# Patient Record
Sex: FEMALE | Race: WHITE | NOT HISPANIC OR LATINO | Employment: FULL TIME | URBAN - METROPOLITAN AREA
[De-identification: names, ages, dates, MRNs, and addresses within clinical notes are randomized per-mention and may not be internally consistent; named-entity substitution may affect disease eponyms.]

---

## 2017-03-14 ENCOUNTER — APPOINTMENT (OUTPATIENT)
Dept: RADIOLOGY | Facility: MEDICAL CENTER | Age: 37
End: 2017-03-14
Attending: EMERGENCY MEDICINE
Payer: COMMERCIAL

## 2017-03-14 ENCOUNTER — APPOINTMENT (OUTPATIENT)
Dept: RADIOLOGY | Facility: MEDICAL CENTER | Age: 37
End: 2017-03-14
Attending: EMERGENCY MEDICINE

## 2017-03-14 ENCOUNTER — HOSPITAL ENCOUNTER (EMERGENCY)
Facility: MEDICAL CENTER | Age: 37
End: 2017-03-14
Attending: EMERGENCY MEDICINE
Payer: COMMERCIAL

## 2017-03-14 VITALS
SYSTOLIC BLOOD PRESSURE: 135 MMHG | BODY MASS INDEX: 22.45 KG/M2 | HEART RATE: 72 BPM | WEIGHT: 122 LBS | DIASTOLIC BLOOD PRESSURE: 86 MMHG | RESPIRATION RATE: 24 BRPM | TEMPERATURE: 98.2 F | HEIGHT: 62 IN | OXYGEN SATURATION: 100 %

## 2017-03-14 DIAGNOSIS — S60.222A CONTUSION OF LEFT HAND, INITIAL ENCOUNTER: ICD-10-CM
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
## 2017-03-14 DIAGNOSIS — S43.005A SHOULDER DISLOCATION, LEFT, INITIAL ENCOUNTER: ICD-10-CM

## 2017-03-14 DIAGNOSIS — R07.81 RIB PAIN ON LEFT SIDE: ICD-10-CM

## 2017-03-14 DIAGNOSIS — S59.912A INJURY OF LEFT FOREARM, INITIAL ENCOUNTER: ICD-10-CM

## 2017-03-14 PROCEDURE — 99285 EMERGENCY DEPT VISIT HI MDM: CPT

## 2017-03-14 PROCEDURE — 96374 THER/PROPH/DIAG INJ IV PUSH: CPT

## 2017-03-14 PROCEDURE — 700105 HCHG RX REV CODE 258: Performed by: EMERGENCY MEDICINE

## 2017-03-14 PROCEDURE — 99152 MOD SED SAME PHYS/QHP 5/>YRS: CPT

## 2017-03-14 PROCEDURE — 96375 TX/PRO/DX INJ NEW DRUG ADDON: CPT

## 2017-03-14 PROCEDURE — 73030 X-RAY EXAM OF SHOULDER: CPT | Mod: LT

## 2017-03-14 PROCEDURE — 700111 HCHG RX REV CODE 636 W/ 250 OVERRIDE (IP): Performed by: EMERGENCY MEDICINE

## 2017-03-14 PROCEDURE — 71101 X-RAY EXAM UNILAT RIBS/CHEST: CPT | Mod: LT

## 2017-03-14 PROCEDURE — 23650 CLTX SHO DSLC W/MNPJ WO ANES: CPT

## 2017-03-14 PROCEDURE — 700111 HCHG RX REV CODE 636 W/ 250 OVERRIDE (IP)

## 2017-03-14 PROCEDURE — 73090 X-RAY EXAM OF FOREARM: CPT | Mod: LT

## 2017-03-14 PROCEDURE — 304562 HCHG STAT O2 MASK/CANNULA

## 2017-03-14 PROCEDURE — 73130 X-RAY EXAM OF HAND: CPT | Mod: LT

## 2017-03-14 PROCEDURE — 72040 X-RAY EXAM NECK SPINE 2-3 VW: CPT

## 2017-03-14 RX ORDER — MORPHINE SULFATE 4 MG/ML
4 INJECTION, SOLUTION INTRAMUSCULAR; INTRAVENOUS ONCE
Status: COMPLETED | OUTPATIENT
Start: 2017-03-14 | End: 2017-03-14

## 2017-03-14 RX ORDER — ONDANSETRON 2 MG/ML
4 INJECTION INTRAMUSCULAR; INTRAVENOUS ONCE
Status: COMPLETED | OUTPATIENT
Start: 2017-03-14 | End: 2017-03-14

## 2017-03-14 RX ORDER — KETOROLAC TROMETHAMINE 30 MG/ML
30 INJECTION, SOLUTION INTRAMUSCULAR; INTRAVENOUS ONCE
Status: COMPLETED | OUTPATIENT
Start: 2017-03-14 | End: 2017-03-14

## 2017-03-14 RX ORDER — SODIUM CHLORIDE 9 MG/ML
1000 INJECTION, SOLUTION INTRAVENOUS ONCE
Status: COMPLETED | OUTPATIENT
Start: 2017-03-14 | End: 2017-03-14

## 2017-03-14 RX ORDER — HYDROCODONE BITARTRATE AND ACETAMINOPHEN 10; 325 MG/1; MG/1
1 TABLET ORAL EVERY 6 HOURS PRN
Qty: 20 TAB | Refills: 0 | Status: SHIPPED | OUTPATIENT
Start: 2017-03-14 | End: 2017-09-26

## 2017-03-14 RX ADMIN — PROPOFOL 140 MG: 10 INJECTION, EMULSION INTRAVENOUS at 13:43

## 2017-03-14 RX ADMIN — MORPHINE SULFATE 4 MG: 4 INJECTION INTRAVENOUS at 12:56

## 2017-03-14 RX ADMIN — KETOROLAC TROMETHAMINE 30 MG: 30 INJECTION, SOLUTION INTRAMUSCULAR; INTRAVENOUS at 13:58

## 2017-03-14 RX ADMIN — SODIUM CHLORIDE 1000 ML: 9 INJECTION, SOLUTION INTRAVENOUS at 13:00

## 2017-03-14 RX ADMIN — ONDANSETRON 4 MG: 2 INJECTION, SOLUTION INTRAMUSCULAR; INTRAVENOUS at 12:55

## 2017-03-14 ASSESSMENT — PAIN SCALES - GENERAL
PAINLEVEL_OUTOF10: 8
PAINLEVEL_OUTOF10: 10
PAINLEVEL_OUTOF10: 10
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
## 2017-03-14 NOTE — ED NOTES
Conscious sedation with Dr. Wood, CHAY Aguilar RRT, and Zahida Brown at bedside. Sister at bedside. Patient tolerated well. Patient alert and following commands. Shoulder immobilizer applied. Sedation start 1315 sedation end 1326. ERP at bedside beforehand counseling patient.

## 2017-03-14 NOTE — ED PROVIDER NOTES
ED Provider Note    [id number] (WITH 1-VIEW CXR) LEFT   Final Result      1.  Negative single view of the chest and left rib series      2.  Mild scoliosis      DX-FOREARM LEFT   Final Result      Negative left forearm series      DX-CERVICAL SPINE-2 OR 3 VIEWS   Final Result      Negative cervical spine series      DX-SHOULDER 2+ LEFT   Final Result      Anatomic reduction of prior left shoulder dislocation      DX-HAND 3+ LEFT   Final Result      Negative left hand series      DX-SHOULDER 2+ LEFT   Final Result      Left anterior/inferior shoulder dislocation without fracture        X-rays are negative.  I have discussed with this patient her chronic pain medication usage, plan for additional 15 tablets if needed, she will contact her normal prescribing physician prior to filling the medication to make sure that her usual doctor is okay with her receiving extra.

## 2017-03-14 NOTE — ED NOTES
Pt undressed in triage room and to [facility] bed per pt approval and er charge. Pt with poss left shoulder dislocation. Refusing ice at this time. unknown last tetanus
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
## 2017-03-14 NOTE — DISCHARGE INSTRUCTIONS
Shoulder Dislocation  Your shoulder is made up of three bones: the collar bone (clavicle); the shoulder blade (scapula), which includes the socket (glenoid cavity); and the upper arm bone (humerus). Your shoulder joint is the place where these bones meet. Strong, fibrous tissues hold these bones together (ligaments). Muscles and strong, fibrous tissues that connect the muscles to these bones (tendons) allow your arm to move through this joint. The range of motion of your shoulder joint is more extensive than most of your other joints, and the glenoid cavity is very shallow. That is the reason that your shoulder joint is one of the most unstable joints in your body. It is far more prone to dislocation than your other joints. Shoulder dislocation is when your humerus is forced out of your shoulder joint.  CAUSES  Shoulder dislocation is caused by a forceful impact on your shoulder. This impact usually is from an injury, such as a sports injury or a fall.  SYMPTOMS  Symptoms of shoulder dislocation include:  · Deformity of your shoulder.  · Intense pain.  · Inability to move your shoulder joint.  · Numbness, weakness, or tingling around your shoulder joint (your neck or down your arm).  · Bruising or swelling around your shoulder.  DIAGNOSIS  In order to diagnose a dislocated shoulder, your caregiver will perform a physical exam. Your caregiver also may have an X-ray exam done to see if you have any broken bones. Magnetic resonance imaging (MRI) is a procedure that sometimes is done to help your caregiver see any damage to the soft tissues around your shoulder, particularly your rotator cuff tendons. Additionally, your caregiver also may have electromyography done to measure the electrical discharges produced in your muscles if you have signs or symptoms of nerve damage.  TREATMENT  A shoulder dislocation is treated by placing the humerus back in the joint (reduction). Your caregiver does this either manually (closed  reduction), by moving your humerus back into the joint through manipulation, or through surgery (open reduction). When your humerus is back in place, severe pain should improve almost immediately.  You also may need to have surgery if you have a weak shoulder joint or ligaments, and you have recurring shoulder dislocations, despite rehabilitation. In rare cases, surgery is necessary if your nerves or blood vessels are damaged during the dislocation.  After your reduction, your arm will be placed in a shoulder immobilizer or sling to keep it from moving. Your caregiver will have you wear your shoulder immobilizer or sling for 3 days to 3 weeks, depending on how serious your dislocation is. When your shoulder immobilizer or sling is removed, your caregiver may prescribe physical therapy to help improve the range of motion in your shoulder joint.  HOME CARE INSTRUCTIONS   The following measures can help to reduce pain and speed up the healing process:  · Rest your injured joint. Do not move it. Avoid activities similar to the one that caused your injury.  · Apply ice to your injured joint for the first day or two after your reduction or as directed by your caregiver. Applying ice helps to reduce inflammation and pain.  ¨ Put ice in a plastic bag.  ¨ Place a towel between your skin and the bag.  ¨ Leave the ice on for 15-20 minutes at a time, every 2 hours while you are awake.  · Exercise your hand by squeezing a soft ball. This helps to eliminate stiffness and swelling in your hand and wrist.  · Take over-the-counter or prescription medicine for pain or discomfort as told by your caregiver.  SEEK IMMEDIATE MEDICAL CARE IF:   · Your shoulder immobilizer or sling becomes damaged.  · Your pain becomes worse rather than better.  · You lose feeling in your arm or hand, or they become white and cold.  MAKE SURE YOU:   · Understand these instructions.  · Will watch your condition.  · Will get help right away if you are not  doing well or get worse.     This information is not intended to replace advice given to you by your health care provider. Make sure you discuss any questions you have with your health care provider.     Document Released: 09/12/2002 Document Revised: 01/08/2016 Document Reviewed: 04/11/2016  [employer] Interactive Patient Education ©2016 [employer]

## 2017-03-14 NOTE — ED NOTES
Discharge instructions provided.  Pt verbalized the understanding of discharge instructions to follow up with PCP and to return to ER if condition worsens.  Pt ambulated out of ER without difficulty.   Sister to drive home. Patient educated on 1 new prescription. CMS reassessed post sling application

## 2017-03-14 NOTE — RESPIRATORY CARE
Conscious Sedation Respiratory Update       O2 (LPM): 2 (03/14/17 1333) sat 100%. On 4 LPM during procedure with O2 sat 100%. Tolerated procedure well.

## 2017-03-14 NOTE — ED AVS SNAPSHOT
3/14/2017          Kathleen Alvarado  [address] [place] [address]    Dear Kathleen:    Pending sale to [facility] wants to ensure your discharge home is safe and you or your loved ones have had all your questions answered regarding your care after you leave the hospital.    You may receive a telephone call within two days of your discharge.  This call is to make certain you understand your discharge instructions as well as ensure we provided you with the best care possible during your stay with us.     The call will only last approximately 3-5 minutes and will be done by a nurse.    Once again, we want to ensure your discharge home is safe and that you have a clear understanding of any next steps in your care.  If you have any questions or concerns, please do not hesitate to contact us, we are here for you.  Thank you for choosing [facility] for your healthcare needs.    Sincerely,    Danny Henderson    [facility]

## 2017-03-14 NOTE — ED AVS SNAPSHOT
ContentForest Access Code: Activation code not generated  Current ContentForest Status: Active    [employer]  A secure, online tool to manage your health information     [employer]’s ContentForest® is a secure, online tool that connects you to your personalized health information from the privacy of your home -- day or night - making it very easy for you to manage your healthcare. Once the activation process is completed, you can even access your medical information using the ContentForest ty, which is available for free in the Apple Ty store or Google Play store.     ContentForest provides the following levels of access (as shown below):   My Chart Features   [facility] Primary Care Doctor [facility]  Specialists [facility]  Urgent  Care Non-[facility]  Primary Care  Doctor   Email your healthcare team securely and privately 24/7 X X X X   Manage appointments: schedule your next appointment; view details of past/upcoming appointments X      Request prescription refills. X      View recent personal medical records, including lab and immunizations X X X X   View health record, including health history, allergies, medications X X X X   Read reports about your outpatient visits, procedures, consult and ER notes X X X X   See your discharge summary, which is a recap of your hospital and/or ER visit that includes your diagnosis, lab results, and care plan. X X       How to register for ContentForest:  1. Go to  [email or web address].  2. Click on the Sign Up Now box, which takes you to the New Member Sign Up page. You will need to provide the following information:  a. Enter your ContentForest Access Code exactly as it appears at the top of this page. (You will not need to use this code after you’ve completed the sign-up process. If you do not sign up before the expiration date, you must request a new code.)   b. Enter your date of birth.   c. Enter your home email address.   d. Click Submit, and follow the next screen’s instructions.  3. Create a ContentForest ID. This will  be your [employer] login ID and cannot be changed, so think of one that is secure and easy to remember.  4. Create a [employer] password. You can change your password at any time.  5. Enter your Password Reset Question and Answer. This can be used at a later time if you forget your password.   6. Enter your e-mail address. This allows you to receive e-mail notifications when new information is available in [employer].  7. Click Sign Up. You can now view your health information.    For assistance activating your [employer] account, call [phone]

## 2017-06-30 ENCOUNTER — HOSPITAL ENCOUNTER (EMERGENCY)
Facility: MEDICAL CENTER | Age: 37
End: 2017-06-30
Attending: EMERGENCY MEDICINE
Payer: COMMERCIAL

## 2017-06-30 ENCOUNTER — APPOINTMENT (OUTPATIENT)
Dept: RADIOLOGY | Facility: MEDICAL CENTER | Age: 37
End: 2017-06-30
Attending: EMERGENCY MEDICINE
Payer: COMMERCIAL

## 2017-06-30 VITALS
BODY MASS INDEX: 22.68 KG/M2 | HEIGHT: 63 IN | OXYGEN SATURATION: 100 % | TEMPERATURE: 98.6 F | WEIGHT: 128 LBS | RESPIRATION RATE: 15 BRPM | HEART RATE: 73 BPM | DIASTOLIC BLOOD PRESSURE: 70 MMHG | SYSTOLIC BLOOD PRESSURE: 133 MMHG

## 2017-06-30 DIAGNOSIS — R56.9 SEIZURE (HCC): ICD-10-CM

## 2017-06-30 LAB
ANION GAP SERPL CALC-SCNC: 11 MMOL/L (ref 0–11.9)
BASOPHILS # BLD AUTO: 0.7 % (ref 0–1.8)
BASOPHILS # BLD: 0.05 K/UL (ref 0–0.12)
BUN SERPL-MCNC: 6 MG/DL (ref 8–22)
CALCIUM SERPL-MCNC: 9 MG/DL (ref 8.5–10.5)
CHLORIDE SERPL-SCNC: 107 MMOL/L (ref 96–112)
CK SERPL-CCNC: 107 U/L (ref 0–154)
CO2 SERPL-SCNC: 19 MMOL/L (ref 20–33)
CREAT SERPL-MCNC: 1.03 MG/DL (ref 0.5–1.4)
EOSINOPHIL # BLD AUTO: 0.16 K/UL (ref 0–0.51)
EOSINOPHIL NFR BLD: 2.3 % (ref 0–6.9)
ERYTHROCYTE [DISTWIDTH] IN BLOOD BY AUTOMATED COUNT: 39 FL (ref 35.9–50)
GFR SERPL CREATININE-BSD FRML MDRD: >60 ML/MIN/1.73 M 2
GLUCOSE SERPL-MCNC: 102 MG/DL (ref 65–99)
HCT VFR BLD AUTO: 35.4 % (ref 37–47)
HGB BLD-MCNC: 12.1 G/DL (ref 12–16)
IMM GRANULOCYTES # BLD AUTO: 0.05 K/UL (ref 0–0.11)
IMM GRANULOCYTES NFR BLD AUTO: 0.7 % (ref 0–0.9)
LYMPHOCYTES # BLD AUTO: 2.66 K/UL (ref 1–4.8)
LYMPHOCYTES NFR BLD: 37.5 % (ref 22–41)
MCH RBC QN AUTO: 32 PG (ref 27–33)
MCHC RBC AUTO-ENTMCNC: 34.2 G/DL (ref 33.6–35)
MCV RBC AUTO: 93.7 FL (ref 81.4–97.8)
MONOCYTES # BLD AUTO: 0.35 K/UL (ref 0–0.85)
MONOCYTES NFR BLD AUTO: 4.9 % (ref 0–13.4)
NEUTROPHILS # BLD AUTO: 3.82 K/UL (ref 2–7.15)
NEUTROPHILS NFR BLD: 53.9 % (ref 44–72)
NRBC # BLD AUTO: 0 K/UL
NRBC BLD AUTO-RTO: 0 /100 WBC
PLATELET # BLD AUTO: 248 K/UL (ref 164–446)
PMV BLD AUTO: 9.4 FL (ref 9–12.9)
POTASSIUM SERPL-SCNC: 3.2 MMOL/L (ref 3.6–5.5)
PROLACTIN SERPL-MCNC: 88.35 NG/ML (ref 2.8–26)
RBC # BLD AUTO: 3.78 M/UL (ref 4.2–5.4)
SODIUM SERPL-SCNC: 137 MMOL/L (ref 135–145)
WBC # BLD AUTO: 7.1 K/UL (ref 4.8–10.8)

## 2017-06-30 PROCEDURE — 96361 HYDRATE IV INFUSION ADD-ON: CPT

## 2017-06-30 PROCEDURE — 85025 COMPLETE CBC W/AUTO DIFF WBC: CPT

## 2017-06-30 PROCEDURE — 36415 COLL VENOUS BLD VENIPUNCTURE: CPT

## 2017-06-30 PROCEDURE — 700102 HCHG RX REV CODE 250 W/ 637 OVERRIDE(OP): Performed by: EMERGENCY MEDICINE

## 2017-06-30 PROCEDURE — 84146 ASSAY OF PROLACTIN: CPT

## 2017-06-30 PROCEDURE — 99284 EMERGENCY DEPT VISIT MOD MDM: CPT

## 2017-06-30 PROCEDURE — 70450 CT HEAD/BRAIN W/O DYE: CPT

## 2017-06-30 PROCEDURE — 700105 HCHG RX REV CODE 258: Performed by: EMERGENCY MEDICINE

## 2017-06-30 PROCEDURE — 82550 ASSAY OF CK (CPK): CPT

## 2017-06-30 PROCEDURE — 96360 HYDRATION IV INFUSION INIT: CPT

## 2017-06-30 PROCEDURE — 80048 BASIC METABOLIC PNL TOTAL CA: CPT

## 2017-06-30 PROCEDURE — A9270 NON-COVERED ITEM OR SERVICE: HCPCS | Performed by: EMERGENCY MEDICINE

## 2017-06-30 RX ORDER — SODIUM CHLORIDE 9 MG/ML
1000 INJECTION, SOLUTION INTRAVENOUS ONCE
Status: COMPLETED | OUTPATIENT
Start: 2017-06-30 | End: 2017-06-30

## 2017-06-30 RX ORDER — POTASSIUM CHLORIDE 1.5 G/1.58G
20 POWDER, FOR SOLUTION ORAL ONCE
Status: COMPLETED | OUTPATIENT
Start: 2017-06-30 | End: 2017-06-30

## 2017-06-30 RX ADMIN — SODIUM CHLORIDE 1000 ML: 9 INJECTION, SOLUTION INTRAVENOUS at 16:53

## 2017-06-30 RX ADMIN — POTASSIUM CHLORIDE 20 MEQ: 1.5 POWDER, FOR SOLUTION ORAL at 18:36

## 2017-06-30 ASSESSMENT — PAIN SCALES - GENERAL: PAINLEVEL_OUTOF10: 0

## 2017-06-30 NOTE — ED AVS SNAPSHOT
Home Care Instructions                                                                                                                Kathleen Alvarado   MRN: [id number]    Department:  [facility], Emergency Dept   Date of Visit:  6/30/2017            [facility], Emergency Dept    [address] [address] [facility] [address]    Phone:  [phone]      You were seen by     Jamie Damon M.D.      Your Diagnosis Was     Seizure (CMS-HCC)     R56.9       These are the medications you received during your hospitalization from 06/30/2017 1555 to 06/30/2017 1900     Date/Time Order Dose Route Action    06/30/2017 1653 NS infusion 1,000 mL 1,000 mL Intravenous New Bag    06/30/2017 1836 potassium chloride (KLOR-CON) 20 MEQ packet 20 mEq 20 mEq Oral Given      Follow-up Information     1. Follow up with Elisa Byrd PA-C In 1 week.    Specialty:  Family Medicine    Contact information    [address] [facility] [address] [phone]          2. Follow up with [facility], Emergency Dept In 1 day.    Specialty:  Emergency Medicine    Why:  As needed, If symptoms worsen    Contact information    [address] [place] [address] [phone]        3. Follow up with Kavitha Guidry M.D. In 1 week.    Specialty:  Neurology    Contact information    [address] [address] [phone]        Medication Information     Review all of your home medications and newly ordered medications with your primary doctor and/or pharmacist as soon as possible. Follow medication instructions as directed by your doctor and/or pharmacist.     Please keep your complete medication list with you and share with your physician. Update the information when medications are discontinued, doses are changed, or new medications (including over-the-counter products) are added; and carry medication information at all times in the event of emergency situations.                  Medication List      ASK your doctor about these medications        Instructions    Morning Afternoon Evening Bedtime    alprazolam 2 MG tablet   Commonly known as:  XANAX        Take 2 mg by mouth at bedtime as needed for Sleep.   Dose:  2 mg                        busPIRone 15 MG tablet   Commonly known as:  BUSPAR        Doctor's comments:  No further renewals from this office without a clinic visit with a new provider   Take 1 Tab by mouth 2 times a day.   Dose:  15 mg                        estradiol 0.1 MG/24HR Pttw   Commonly known as:  VIVELLE DOT        Apply 1 Patch to skin as directed 2 times a week. Use one patch two times per week.   Dose:  1 Patch                        furosemide 20 MG Tabs   Commonly known as:  LASIX        Take 1 Tab by mouth 1 time daily as needed. Indications: Edema   Dose:  20 mg                        * hydrocodone/acetaminophen  MG Tabs   Commonly known as:  NORCO        Take 1 Tab by mouth 2 times a day as needed.   Dose:  1 Tab                        * hydrocodone/acetaminophen  MG Tabs   Commonly known as:  NORCO        Take 1 Tab by mouth every 6 hours as needed for Severe Pain.   Dose:  1 Tab                        * hydrocodone/acetaminophen  MG Tabs   Commonly known as:  NORCO        Take 1 Tab by mouth every 6 hours as needed.   Dose:  1 Tab                        levothyroxine 100 MCG Tabs   Commonly known as:  SYNTHROID        Doctor's comments:  Needs to have labs done for further refills.   Take 1 Tab by mouth every day.   Dose:  100 mcg                        * Notice:  This list has 3 medication(s) that are the same as other medications prescribed for you. Read the directions carefully, and ask your doctor or other care provider to review them with you.            Procedures and tests performed during your visit     BASIC METABOLIC PANEL    CBC WITH DIFFERENTIAL    CREATINE KINASE    CT-HEAD W/O    ESTIMATED GFR    PROLACTIN        Discharge  Instructions       Driving and Equipment Restrictions  Some medical problems make it dangerous to drive, ride a bike, or use machines. Some of these problems are:  · A hard blow to the head (concussion).  · Passing out (fainting).  · Twitching and shaking (seizures).  · Low blood sugar.  · Taking medicine to help you relax (sedatives).  · Taking pain medicines.  · Wearing an eye patch.  · Wearing splints. This can make it hard to use parts of your body that you need to drive safely.  HOME CARE   · Do not drive until your doctor says it is okay.  · Do not use machines until your doctor says it is okay.  You may need a form signed by your doctor (medical release) before you can drive again. You may also need this form before you do other tasks where you need to be fully alert.  MAKE SURE YOU:  · Understand these instructions.  · Will watch your condition.  · Will get help right away if you are not doing well or get worse.     This information is not intended to replace advice given to you by your health care provider. Make sure you discuss any questions you have with your health care provider.     Document Released: 01/25/2006 Document Revised: 03/11/2013 Document Reviewed: 04/27/2011  [employer] Interactive Patient Education ©2016 [employer]    Epilepsy  People with epilepsy have times when they shake and jerk uncontrollably (seizures). This happens when there is a sudden change in brain function. Epilepsy may have many possible causes. Anything that disturbs the normal pattern of brain cell activity can lead to seizures.  HOME CARE   · Follow your doctor's instructions about driving and safety during normal activities.  · Get enough sleep.  · Only take medicine as told by your doctor.  · Avoid things that you know can cause you to have seizures (triggers).  · Write down when your seizures happen and what you remember about each seizure. Write down anything you think may have caused the seizure to happen.  · Tell  the people you live and work with that you have seizures. Make sure they know how to help you. They should:  ¨ Cushion your head and body.  ¨ Turn you on your side.  ¨ Not restrain you.  ¨ Not place anything inside your mouth.  ¨ Call for local emergency medical help if there is any question about what has happened.  · Keep all follow-up visits with your doctor. This is very important.  GET HELP IF:  · You get an infection or start to feel sick. You may have more seizures when you are sick.  · You are having seizures more often.  · Your seizure pattern is changing.  GET HELP RIGHT AWAY IF:   · A seizure does not stop after a few seconds or minutes.  · A seizure causes you to have trouble breathing.  · A seizure gives you a very bad headache.  · A seizure makes you unable to speak or use a part of your body.     This information is not intended to replace advice given to you by your health care provider. Make sure you discuss any questions you have with your health care provider.     Document Released: 10/15/2010 Document Revised: 10/08/2014 Document Reviewed: 07/30/2014  [employer] Interactive Patient Education ©2016 [employer]            Patient Information     Patient Information    Following emergency treatment: all patient requiring follow-up care must return either to a private physician or a clinic if your condition worsens before you are able to obtain further medical attention, please return to the emergency room.     Billing Information    At [facility], we work to make the billing process streamlined for our patients.  Our Representatives are here to answer any questions you may have regarding your hospital bill.  If you have insurance coverage and have supplied your insurance information to us, we will submit a claim to your insurer on your behalf.  Should you have any questions regarding your bill, we can be reached online or by phone as follows:  Online: You are able pay your bills online or live  chat with our representatives about any billing questions you may have. We are here to help Monday - Friday from 8:00am to 7:30pm and 9:00am - 12:00pm on Saturdays.  Please visit [email or web address]  for more information.   Phone:  [phone] or [phone]    Please note that your emergency physician, surgeon, pathologist, radiologist, anesthesiologist, and other specialists are not employed by [facility] and will therefore bill separately for their services.  Please contact them directly for any questions concerning their bills at the numbers below:     Emergency Physician Services:  [phone]  [facility]:  [phone]  [facility]:  [phone]  [facility]:  [phone]    1. Your final bill may vary from the amount quoted upon discharge if all procedures are not complete at that time, or if your doctor has additional procedures of which we are not aware. You will receive an additional bill if you return to the Emergency Department at [facility] for suture removal regardless of the facility of which the sutures were placed.     2. Please arrange for settlement of this account at the emergency registration.    3. All self-pay accounts are due in full at the time of treatment.  If you are unable to meet this obligation then payment is expected within 4-5 days.     4. If you have had radiology studies (CT, X-ray, Ultrasound, MRI), you have received a preliminary result during your emergency department visit. Please contact the radiology department [phone] to receive a copy of your final result. Please discuss the Final result with your primary physician or with the follow up physician provided.     Crisis Hotline:  [place] Crisis Hotline:  9-519-NTMFOAE or 1-824.903.1494  [place] Crisis Hotline:    1-536.942.2154 or 849-809-3951         ED Discharge Follow Up Questions    1. In order to provide you with very good  care, we would like to follow up with a phone call in the next few days.  May we have your permission to contact you?     YES /  NO    2. What is the best phone number to call you? (       )_____-__________    3. What is the best time to call you?      Morning  /  Afternoon  /  Evening                   Patient Signature:  ____________________________________________________________    Date:  ____________________________________________________________

## 2017-06-30 NOTE — ED AVS SNAPSHOT
Acunu Access Code: Activation code not generated  Current Acunu Status: Active    [employer]  A secure, online tool to manage your health information     [employer] Acunu® is a secure, online tool that connects you to your personalized health information from the privacy of your home -- day or night - making it very easy for you to manage your healthcare. Once the activation process is completed, you can even access your medical information using the Acunu ty, which is available for free in the Apple Ty store or Google Play store.     Acunu provides the following levels of access (as shown below):   My Chart Features   [facility] Primary Care Doctor [facility]  Specialists [facility]  Urgent  Care Non-[facility]  Primary Care  Doctor   Email your healthcare team securely and privately 24/7 X X X X   Manage appointments: schedule your next appointment; view details of past/upcoming appointments X      Request prescription refills. X      View recent personal medical records, including lab and immunizations X X X X   View health record, including health history, allergies, medications X X X X   Read reports about your outpatient visits, procedures, consult and ER notes X X X X   See your discharge summary, which is a recap of your hospital and/or ER visit that includes your diagnosis, lab results, and care plan. X X       How to register for Acunu:  1. Go to  [email or web address].  2. Click on the Sign Up Now box, which takes you to the New Member Sign Up page. You will need to provide the following information:  a. Enter your Acunu Access Code exactly as it appears at the top of this page. (You will not need to use this code after you’ve completed the sign-up process. If you do not sign up before the expiration date, you must request a new code.)   b. Enter your date of birth.   c. Enter your home email address.   d. Click Submit, and follow the next screen’s instructions.  3. Create a Acunu ID. This will  be your [employer] login ID and cannot be changed, so think of one that is secure and easy to remember.  4. Create a [employer] password. You can change your password at any time.  5. Enter your Password Reset Question and Answer. This can be used at a later time if you forget your password.   6. Enter your e-mail address. This allows you to receive e-mail notifications when new information is available in [employer].  7. Click Sign Up. You can now view your health information.    For assistance activating your [employer] account, call [phone]

## 2017-06-30 NOTE — ED AVS SNAPSHOT
6/30/2017    Kathleen Leger [place] [address]    Dear Kathleen:    [facility] wants to ensure your discharge home is safe and you or your loved ones have had all of your questions answered regarding your care after you leave the hospital.    Below is a list of resources and contact information should you have any questions regarding your hospital stay, follow-up instructions, or active medical symptoms.    Questions or Concerns Regarding… Contact   Medical Questions Related to Your Discharge  (7 days a week, 8am-5pm) Contact a Nurse Care Coordinator   [phone]   Medical Questions Not Related to Your Discharge  (24 hours a day / 7 days a week)  Contact the Nurse Health Line   [phone]    Medications or Discharge Instructions Refer to your discharge packet   or contact your [facility] Primary Care Provider   [phone]   Follow-up Appointment(s) Schedule your appointment via Loomia   or contact Scheduling [phone]   Billing Review your statement via Loomia  or contact Billing [phone]   Medical Records Review your records via Loomia   or contact Medical Records [phone]     You may receive a telephone call within two days of discharge. This call is to make certain you understand your discharge instructions and have the opportunity to have any questions answered. You can also easily access your medical information, test results and upcoming appointments via the Loomia free online health management tool. You can learn more and sign up at [employer]/Loomia. For assistance setting up your Loomia account, please call [phone].    Once again, we want to ensure your discharge home is safe and that you have a clear understanding of any next steps in your care. If you have any questions or concerns, please do not hesitate to contact us, we are here for you. Thank you for choosing [facility] for your healthcare needs.    Sincerely,    Your [facility] Healthcare Team
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
## 2017-07-01 NOTE — DISCHARGE INSTRUCTIONS
Driving and Equipment Restrictions  Some medical problems make it dangerous to drive, ride a bike, or use machines. Some of these problems are:  · A hard blow to the head (concussion).  · Passing out (fainting).  · Twitching and shaking (seizures).  · Low blood sugar.  · Taking medicine to help you relax (sedatives).  · Taking pain medicines.  · Wearing an eye patch.  · Wearing splints. This can make it hard to use parts of your body that you need to drive safely.  HOME CARE   · Do not drive until your doctor says it is okay.  · Do not use machines until your doctor says it is okay.  You may need a form signed by your doctor (medical release) before you can drive again. You may also need this form before you do other tasks where you need to be fully alert.  MAKE SURE YOU:  · Understand these instructions.  · Will watch your condition.  · Will get help right away if you are not doing well or get worse.     This information is not intended to replace advice given to you by your health care provider. Make sure you discuss any questions you have with your health care provider.     Document Released: 01/25/2006 Document Revised: 03/11/2013 Document Reviewed: 04/27/2011  [employer] Patient Education ©2016 [employer]    Epilepsy  People with epilepsy have times when they shake and jerk uncontrollably (seizures). This happens when there is a sudden change in brain function. Epilepsy may have many possible causes. Anything that disturbs the normal pattern of brain cell activity can lead to seizures.  HOME CARE   · Follow your doctor's instructions about driving and safety during normal activities.  · Get enough sleep.  · Only take medicine as told by your doctor.  · Avoid things that you know can cause you to have seizures (triggers).  · Write down when your seizures happen and what you remember about each seizure. Write down anything you think may have caused the seizure to happen.  · Tell the people you live  and work with that you have seizures. Make sure they know how to help you. They should:  ¨ Cushion your head and body.  ¨ Turn you on your side.  ¨ Not restrain you.  ¨ Not place anything inside your mouth.  ¨ Call for local emergency medical help if there is any question about what has happened.  · Keep all follow-up visits with your doctor. This is very important.  GET HELP IF:  · You get an infection or start to feel sick. You may have more seizures when you are sick.  · You are having seizures more often.  · Your seizure pattern is changing.  GET HELP RIGHT AWAY IF:   · A seizure does not stop after a few seconds or minutes.  · A seizure causes you to have trouble breathing.  · A seizure gives you a very bad headache.  · A seizure makes you unable to speak or use a part of your body.     This information is not intended to replace advice given to you by your health care provider. Make sure you discuss any questions you have with your health care provider.     Document Released: 10/15/2010 Document Revised: 10/08/2014 Document Reviewed: 07/30/2014  [employer] Interactive Patient Education ©2016 [employer]

## 2017-07-01 NOTE — ED NOTES
All lines and monitors d/c'd. Discharge paperwork and medications reviewed with pt her her partner. Pt states she understands and had no questions. Pt encouraged to follow-up with PCP and neurology and come back to ER with worsening symptoms. Instructed not to drive until cleared by a neurologist or her PCP.  Pt verbalizes understanding. Pt ambulated out of ED with steady gait.

## 2017-09-26 DIAGNOSIS — Z01.812 PRE-OPERATIVE LABORATORY EXAMINATION: ICD-10-CM

## 2017-09-26 LAB
ANION GAP SERPL CALC-SCNC: 10 MMOL/L (ref 0–11.9)
BUN SERPL-MCNC: 13 MG/DL (ref 8–22)
CALCIUM SERPL-MCNC: 9.9 MG/DL (ref 8.5–10.5)
CHLORIDE SERPL-SCNC: 103 MMOL/L (ref 96–112)
CO2 SERPL-SCNC: 25 MMOL/L (ref 20–33)
CREAT SERPL-MCNC: 0.85 MG/DL (ref 0.5–1.4)
GFR SERPL CREATININE-BSD FRML MDRD: >60 ML/MIN/1.73 M 2
GLUCOSE SERPL-MCNC: 89 MG/DL (ref 65–99)
POTASSIUM SERPL-SCNC: 3.7 MMOL/L (ref 3.6–5.5)
SODIUM SERPL-SCNC: 138 MMOL/L (ref 135–145)

## 2017-09-26 PROCEDURE — 80048 BASIC METABOLIC PNL TOTAL CA: CPT

## 2017-09-26 PROCEDURE — 36415 COLL VENOUS BLD VENIPUNCTURE: CPT

## 2017-09-26 RX ORDER — LEVOTHYROXINE SODIUM 0.1 MG/1
100 TABLET ORAL
COMMUNITY
End: 2022-03-09

## 2017-09-27 ENCOUNTER — HOSPITAL ENCOUNTER (OUTPATIENT)
Facility: MEDICAL CENTER | Age: 37
End: 2017-09-27
Attending: SURGERY | Admitting: SURGERY
Payer: COMMERCIAL

## 2017-09-27 VITALS
RESPIRATION RATE: 16 BRPM | HEIGHT: 62 IN | TEMPERATURE: 98.3 F | OXYGEN SATURATION: 100 % | WEIGHT: 115.52 LBS | HEART RATE: 58 BPM | BODY MASS INDEX: 21.26 KG/M2

## 2017-09-27 PROBLEM — K40.91 UNILATERAL RECURRENT INGUINAL HERNIA: Status: ACTIVE | Noted: 2017-09-27

## 2017-09-27 PROCEDURE — 160048 HCHG OR STATISTICAL LEVEL 1-5: Performed by: SURGERY

## 2017-09-27 PROCEDURE — 160029 HCHG SURGERY MINUTES - 1ST 30 MINS LEVEL 4: Performed by: SURGERY

## 2017-09-27 PROCEDURE — 160046 HCHG PACU - 1ST 60 MINS PHASE II: Performed by: SURGERY

## 2017-09-27 PROCEDURE — 502714 HCHG ROBOTIC SURGERY SERVICES: Performed by: SURGERY

## 2017-09-27 PROCEDURE — 160009 HCHG ANES TIME/MIN: Performed by: SURGERY

## 2017-09-27 PROCEDURE — 700102 HCHG RX REV CODE 250 W/ 637 OVERRIDE(OP)

## 2017-09-27 PROCEDURE — 700111 HCHG RX REV CODE 636 W/ 250 OVERRIDE (IP)

## 2017-09-27 PROCEDURE — 160025 RECOVERY II MINUTES (STATS): Performed by: SURGERY

## 2017-09-27 PROCEDURE — 160041 HCHG SURGERY MINUTES - EA ADDL 1 MIN LEVEL 4: Performed by: SURGERY

## 2017-09-27 PROCEDURE — 160002 HCHG RECOVERY MINUTES (STAT): Performed by: SURGERY

## 2017-09-27 PROCEDURE — 503108 HCHG CANNULA SEAL 8.5-13MM: Performed by: SURGERY

## 2017-09-27 PROCEDURE — 500868 HCHG NEEDLE, SURGI(VARES): Performed by: SURGERY

## 2017-09-27 PROCEDURE — 700101 HCHG RX REV CODE 250

## 2017-09-27 PROCEDURE — A9270 NON-COVERED ITEM OR SERVICE: HCPCS

## 2017-09-27 PROCEDURE — 502240 HCHG MISC OR SUPPLY RC 0272: Performed by: SURGERY

## 2017-09-27 PROCEDURE — 501838 HCHG SUTURE GENERAL: Performed by: SURGERY

## 2017-09-27 PROCEDURE — 160036 HCHG PACU - EA ADDL 30 MINS PHASE I: Performed by: SURGERY

## 2017-09-27 PROCEDURE — 160035 HCHG PACU - 1ST 60 MINS PHASE I: Performed by: SURGERY

## 2017-09-27 PROCEDURE — 503366 HCHG TROCAR, 12X150 KII FIOS Z THR: Performed by: SURGERY

## 2017-09-27 RX ORDER — KETOROLAC TROMETHAMINE 30 MG/ML
INJECTION, SOLUTION INTRAMUSCULAR; INTRAVENOUS
Status: COMPLETED
Start: 2017-09-27 | End: 2017-09-27

## 2017-09-27 RX ORDER — BUPIVACAINE HYDROCHLORIDE AND EPINEPHRINE 5; 5 MG/ML; UG/ML
INJECTION, SOLUTION EPIDURAL; INTRACAUDAL; PERINEURAL
Status: DISCONTINUED | OUTPATIENT
Start: 2017-09-27 | End: 2017-09-27 | Stop reason: HOSPADM

## 2017-09-27 RX ORDER — OXYCODONE HCL 5 MG/5 ML
SOLUTION, ORAL ORAL
Status: COMPLETED
Start: 2017-09-27 | End: 2017-09-27

## 2017-09-27 RX ORDER — HALOPERIDOL 5 MG/ML
INJECTION INTRAMUSCULAR
Status: COMPLETED
Start: 2017-09-27 | End: 2017-09-27

## 2017-09-27 RX ORDER — MAGNESIUM HYDROXIDE 1200 MG/15ML
LIQUID ORAL
Status: DISCONTINUED | OUTPATIENT
Start: 2017-09-27 | End: 2017-09-27 | Stop reason: HOSPADM

## 2017-09-27 RX ORDER — SODIUM CHLORIDE, SODIUM LACTATE, POTASSIUM CHLORIDE, CALCIUM CHLORIDE 600; 310; 30; 20 MG/100ML; MG/100ML; MG/100ML; MG/100ML
INJECTION, SOLUTION INTRAVENOUS CONTINUOUS
Status: DISCONTINUED | OUTPATIENT
Start: 2017-09-27 | End: 2017-09-27 | Stop reason: HOSPADM

## 2017-09-27 RX ADMIN — KETOROLAC TROMETHAMINE 15 MG: 30 INJECTION, SOLUTION INTRAMUSCULAR at 18:50

## 2017-09-27 RX ADMIN — HALOPERIDOL LACTATE 1 MG: 5 INJECTION, SOLUTION INTRAMUSCULAR at 19:30

## 2017-09-27 RX ADMIN — SODIUM CHLORIDE, SODIUM LACTATE, POTASSIUM CHLORIDE, CALCIUM CHLORIDE: 600; 310; 30; 20 INJECTION, SOLUTION INTRAVENOUS at 16:00

## 2017-09-27 RX ADMIN — OXYCODONE HYDROCHLORIDE 10 MG: 5 SOLUTION ORAL at 18:50

## 2017-09-27 RX ADMIN — FENTANYL CITRATE 50 MCG: 50 INJECTION, SOLUTION INTRAMUSCULAR; INTRAVENOUS at 19:00

## 2017-09-27 RX ADMIN — FENTANYL CITRATE 50 MCG: 50 INJECTION, SOLUTION INTRAMUSCULAR; INTRAVENOUS at 18:49

## 2017-09-27 RX ADMIN — FENTANYL CITRATE 50 MCG: 50 INJECTION, SOLUTION INTRAMUSCULAR; INTRAVENOUS at 19:41

## 2017-09-27 RX ADMIN — FENTANYL CITRATE 50 MCG: 50 INJECTION, SOLUTION INTRAMUSCULAR; INTRAVENOUS at 19:28

## 2017-09-27 ASSESSMENT — PAIN SCALES - GENERAL
PAINLEVEL_OUTOF10: 8
PAINLEVEL_OUTOF10: 7
PAINLEVEL_OUTOF10: 8

## 2017-09-28 NOTE — DISCHARGE INSTRUCTIONS
ACTIVITY: Rest and take it easy for the first 24 hours.  A responsible adult is recommended to remain with you during that time.  It is normal to feel sleepy.  We encourage you to not do anything that requires balance, judgment or coordination.    MILD FLU-LIKE SYMPTOMS ARE NORMAL. YOU MAY EXPERIENCE GENERALIZED MUSCLE ACHES, THROAT IRRITATION, HEADACHE AND/OR SOME NAUSEA.    FOR 24 HOURS DO NOT:  Drive, operate machinery or run household appliances.  Drink beer or alcoholic beverages.   Make important decisions or sign legal documents.    SPECIAL INSTRUCTIONS:   Hernia Repair  Care After  These instructions give you information on caring for yourself after your procedure. Your doctor may also give you more specific instructions. Call your doctor if you have any problems or questions after your procedure.  HOME CARE   · You may have changes in your poops (bowel movements).  · You may have loose or watery poop (diarrhea).  · You may be not able to poop.  · Your bowels will slowly get back to normal.  · Do not eat any food that makes you sick to your stomach (nauseous). Eat small meals 4 to 6 times a day instead of 3 large ones.  · Do not drink pop. It will give you gas.  · Do not drink alcohol.  · Do not lift anything heavier than 10 pounds. This is about the weight of a gallon of milk.  · Do not do anything that makes you very tired for at least 6 weeks.  · Do not get your wound wet for 2 days.  · You may take a sponge bath during this time.  · After 2 days you may take a shower. Gently pat your surgical cut (incision) dry with a towel. Do not rub it.  · For men: You may have been given an athletic supporter (scrotal support) before you left the hospital. It holds your scrotum and testicles closer to your body so there is no strain on your wound. Wear the supporter until your doctor tells you that you do not need it anymore.  GET HELP RIGHT AWAY IF:  · You have watery poop, or cannot poop for more than 3  days.  · You feel sick to your stomach or throw up (vomit) more than 2 or 3 times.  · You have temperature by mouth above 102° F (38.9° C).  · You see redness or puffiness (swelling) around your wound.  · You see yellowish white fluid (pus) coming from your wound.  · You see a bulge or bump in your lower belly (abdomen) or near your groin.  · You develop a rash, trouble breathing, or any other symptoms from medicines taken.  MAKE SURE YOU:  · Understand these instructions.  · Will watch your condition.  · Will get help right away if your are not doing well or get worse.  Document Released: 11/30/2009 Document Revised: 03/11/2013 Document Reviewed: 11/30/2009  [employer]® Patient Information ©2014 [employer]      DIET: To avoid nausea, slowly advance diet as tolerated, avoiding spicy or greasy foods for the first day.  Add more substantial food to your diet according to your physician's instructions.    INCREASE FLUIDS AND FIBER TO AVOID CONSTIPATION.    SURGICAL DRESSING/BATHING: as directed    FOLLOW-UP APPOINTMENT:  A follow-up appointment should be arranged with your doctor in 1 week; call to schedule.    You should CALL YOUR PHYSICIAN if you develop:  Fever greater than 101 degrees F.  Pain not relieved by medication, or persistent nausea or vomiting.  Excessive bleeding (blood soaking through dressing) or unexpected drainage from the wound.  Extreme redness or swelling around the incision site, drainage of pus or foul smelling drainage.  Inability to urinate or empty your bladder within 8 hours.  Problems with breathing or chest pain.    You should call 911 if you develop problems with breathing or chest pain.  If you are unable to contact your doctor or surgical center, you should go to the nearest emergency room or urgent care center.  Physician's telephone #: [phone]    If any questions arise, call your doctor.  If your doctor is not available, please feel free to call the Surgical Center at  [phone].  The Center is open Monday through Friday from 7AM to 7PM.  You can also call the HEALTH HOTLINE open 24 hours/day, 7 days/week and speak to a nurse at [phone], or toll free at [phone].    A registered nurse may call you a few days after your surgery to see how you are doing after your procedure.    MEDICATIONS: Resume taking daily medication.  Take prescribed pain medication with food.  If no medication is prescribed, you may take non-aspirin pain medication if needed.  PAIN MEDICATION CAN BE VERY CONSTIPATING.  Take a stool softener or laxative such as senokot, pericolace, or milk of magnesia if needed.     Last pain medication given at 6:50pm.    If your physician has prescribed pain medication that includes Acetaminophen (Tylenol), do not take additional Acetaminophen (Tylenol) while taking the prescribed medication.    Depression / Suicide Risk    As you are discharged from this [facility] facility, it is important to learn how to keep safe from harming yourself.    Recognize the warning signs:  · Abrupt changes in personality, positive or negative- including increase in energy   · Giving away possessions  · Change in eating patterns- significant weight changes-  positive or negative  · Change in sleeping patterns- unable to sleep or sleeping all the time   · Unwillingness or inability to communicate  · Depression  · Unusual sadness, discouragement and loneliness  · Talk of wanting to die  · Neglect of personal appearance   · Rebelliousness- reckless behavior  · Withdrawal from people/activities they love  · Confusion- inability to concentrate     If you or a loved one observes any of these behaviors or has concerns about self-harm, here's what you can do:  · Talk about it- your feelings and reasons for harming yourself  · Remove any means that you might use to hurt yourself (examples: pills, rope, extension cords, firearm)  · Get professional help from the community (Mental  Health, Substance Abuse, psychological counseling)  · Do not be alone:Call your Safe Contact- someone whom you trust who will be there for you.  · Call your local CRISIS HOTLINE 310-7188 or 843-543-4614  · Call your local Children's Mobile Crisis Response Team [place] (198) 785-0105 or www.[employer]  · Call the toll free National Suicide Prevention Hotlines   · National Suicide Prevention Lifeline 300-977-EOMT (3923)  · National Hope Line Network 800-SUICIDE (196-1839)

## 2017-09-28 NOTE — OR NURSING
Report called to Makayla ELDER. Plan of care discussed. Patient reporting 8/10 discomfort in abdomen that is tolerable per patient. Patient walked to bathroom and voided without difficulty. No complaints of nausea at this time. VSS.

## 2017-09-28 NOTE — OR SURGEON
Operative Report    PreOp Diagnosis: LLQ  pain    PostOp Diagnosis: recurrent VIH    Procedure(s):  Ventral  HERNIA REPAIR ROBOTIC RECURRENT - Wound Class: Clean    Surgeon(s):  TONEY Lewis M.D.    Anesthesiologist/Type of Anesthesia:  Anesthesiologist: Tigre Machado D.O./General    Surgical Staff:  Circulator: MARK Mckee Circulator: Gilmer Schultz R.N.  Relief Scrub: Hien Jacob C.N.A.  Scrub Person: Bibi Fernando    Specimens:  * No specimens in log *    Estimated Blood Loss: 20    Findings: detached mesh    Complications: 0        9/27/2017 6:36 PM Tigre Javed

## 2017-09-28 NOTE — OP REPORT
DATE OF SERVICE:  09/27/2017    PREOPERATIVE DIAGNOSIS:  Left lower quadrant abdominal pain with possible   recurrent inguinal hernia.    POSTOPERATIVE DIAGNOSIS:  Recurrent ventral incisional hernia with detached   mesh and adhesions.    OPERATION PERFORMED:  Robotic-assisted laparoscopic adhesiolysis with   re-repair of recurrent ventral incisional hernia.    SURGEON:  Tigre Javed MD    ANESTHESIA:  Tigre Machado DO    ASSISTANT:  Dr. Moreira.    OPERATIVE NOTE:  The patient presents with some catching sensation in the left   lower quadrant and recurrent pain with a bulge.  She previously had an   incision in the left lower quadrant for removal of her right colon when she   was having been treated for constipation and cecal volvulus.  The patient had   developed a hernia in that incision and had undergone previous repair with   mesh implantation.  Recently, she has developed increasing symptoms in the   left lower quadrant with a catching sensation and a reported bulge.  The   patient underwent workup, which included a CT scan, ultrasound, and physical   examination, and no hernia could be found, but it was felt that her symptoms   warranted laparoscopy and possible hernia repair.  The risks, possible   complications of operation were explained to the patient in detail and she   consented to proceed.  The patient was taken to the operating room and placed   under anesthesia by Dr. Machado.  She did receive prophylactic antibiotics.    Sequential stockings were applied as anti-embolism prophylaxis.  Her abdomen   was prepped with ChloraPrep.  Sterile drapes were applied and a time out was   affected.  A solution of 0.5% Marcaine with epinephrine was liberally   infiltrated into all wounds.  An incision was made in the left upper quadrant.    A Veress needle was inserted.  Saline drop test was permissive to proceed   with step pneumo insufflation.  Once pneumo insufflated, the patient had an 8   mm trocar  placed.  Conventional laparoscopy was utilized to look in the   abdomen.  The patient was found to have significant adhesions in the left   lower quadrant.  An 12 mm trocar was placed in the right mid epigastrium and   an 8 mm trocar was placed in the right upper quadrant.  The robot was brought   in and docked.  Instrumentation was introduced along with the camera.  Dr. Javed retired to the console for operation.  The patient had the adhesions   inspected.  They were found to be adhesed to the loose edge of the mesh, which   was hanging inferiorly.  These adhesions were taken down using sharp   dissection electrocautery.  The patient had inspection of the pelvic floor.    It could be well visualized.  There was no evidence of hernia.  The patient   did have what appeared to be a redundant segment of the mesh hanging down   inferiorly and the area of previous repair could be well seen.  This appeared   to be intact.  The inferior aspect of the mesh was then trimmed.  The trimmed   remnants were removed.  This was not in the area of the hernia, but inferior   to that where there was overlap of the mesh.  The peritoneum was then opened   and mobilized widely down across the pelvis and round ligament.  This was then   elevated and sutured back over the defect and the mesh reperitonealizing the   surface and resecuring the mesh on the inferior aspect.  This was done using   2-0 Stratafix suture.  This having been accomplished, the mesh was rerepaired   and the hernia was rerepaired, and the procedure was completed.  The robot was   undocked.  The needle was retrieved from the abdominal cavity.  Counts were   correct at this point.  Pneumoperitoneum was collapsed.  The fascia closed   using 0 Vicryl suture and the skin with running 4-0 Monocryl subcuticular and   the wounds were sealed with Dermabond.  The patient was awakened, extubated,   and taken to recovery room in good condition.  Estimated blood loss was 30  ml.    Sponge, instrument, and needle counts reported as correct for all phases of   surgery, which was uncomplicated.  There were no other untoward findings.  She   was given a prescription for oxycodone IR 5 mg #30 for pain and Zofran 4 mg   #10 for nausea.  She was asked to return to see me in the office in   approximately one week, sooner if there is a problem in the interim.       ____________________________________     MD CHRISTOPHER PAGE / DEWAYNE    DD:  09/27/2017 18:44:44  DT:  09/27/2017 19:13:37    D#:  [id number]  Job#:  [id number]    cc: NEERU GIBSON MD, Dr. Harish Polk DO

## 2017-09-28 NOTE — OR NURSING
VSS, pt steady with ambulation, meets discharge criteria. Discharged home with father. Wheeled to car with hospital escort. Rx already filled. Drsgs CDI. IV dc'd, cathlon intact. Pt to f/u with physician as directed by physician. Pt to return to ER for any emergent sx. Pt verbalizes understanding of discharge instructions and all questions were answered.

## 2018-01-03 ENCOUNTER — HOSPITAL ENCOUNTER (OUTPATIENT)
Dept: RADIOLOGY | Facility: MEDICAL CENTER | Age: 38
End: 2018-01-03
Attending: SURGERY
Payer: COMMERCIAL

## 2018-01-03 DIAGNOSIS — K56.600 PARTIAL SMALL BOWEL OBSTRUCTION (HCC): ICD-10-CM

## 2018-01-03 PROCEDURE — 74021 RADEX ABDOMEN 3+ VIEWS: CPT

## 2018-01-25 ENCOUNTER — HOSPITAL ENCOUNTER (OUTPATIENT)
Dept: RADIOLOGY | Facility: MEDICAL CENTER | Age: 38
End: 2018-01-25
Attending: SURGERY
Payer: COMMERCIAL

## 2018-01-25 DIAGNOSIS — K56.600 PARTIAL INTESTINAL OBSTRUCTION, UNSPECIFIED CAUSE (HCC): ICD-10-CM

## 2018-01-25 PROCEDURE — 74245 DX-UPPER GI-SMALL BOWEL FOLLOW THRU: CPT

## 2018-01-25 PROCEDURE — 700117 HCHG RX CONTRAST REV CODE 255: Performed by: SURGERY

## 2018-01-25 RX ADMIN — IOHEXOL 100 ML: 300 INJECTION, SOLUTION INTRAVENOUS at 14:20

## 2018-03-13 DIAGNOSIS — Z01.812 PRE-OPERATIVE LABORATORY EXAMINATION: ICD-10-CM

## 2018-03-13 LAB
ANION GAP SERPL CALC-SCNC: 10 MMOL/L (ref 0–11.9)
BASOPHILS # BLD AUTO: 1.3 % (ref 0–1.8)
BASOPHILS # BLD: 0.11 K/UL (ref 0–0.12)
BUN SERPL-MCNC: 15 MG/DL (ref 8–22)
CALCIUM SERPL-MCNC: 9.7 MG/DL (ref 8.5–10.5)
CHLORIDE SERPL-SCNC: 102 MMOL/L (ref 96–112)
CO2 SERPL-SCNC: 27 MMOL/L (ref 20–33)
CREAT SERPL-MCNC: 1.07 MG/DL (ref 0.5–1.4)
EOSINOPHIL # BLD AUTO: 0.48 K/UL (ref 0–0.51)
EOSINOPHIL NFR BLD: 5.5 % (ref 0–6.9)
ERYTHROCYTE [DISTWIDTH] IN BLOOD BY AUTOMATED COUNT: 42.7 FL (ref 35.9–50)
GLUCOSE SERPL-MCNC: 88 MG/DL (ref 65–99)
HCT VFR BLD AUTO: 42.7 % (ref 37–47)
HGB BLD-MCNC: 14.2 G/DL (ref 12–16)
IMM GRANULOCYTES # BLD AUTO: 0.04 K/UL (ref 0–0.11)
IMM GRANULOCYTES NFR BLD AUTO: 0.5 % (ref 0–0.9)
LYMPHOCYTES # BLD AUTO: 2.17 K/UL (ref 1–4.8)
LYMPHOCYTES NFR BLD: 25 % (ref 22–41)
MCH RBC QN AUTO: 31.7 PG (ref 27–33)
MCHC RBC AUTO-ENTMCNC: 33.3 G/DL (ref 33.6–35)
MCV RBC AUTO: 95.3 FL (ref 81.4–97.8)
MONOCYTES # BLD AUTO: 0.47 K/UL (ref 0–0.85)
MONOCYTES NFR BLD AUTO: 5.4 % (ref 0–13.4)
NEUTROPHILS # BLD AUTO: 5.42 K/UL (ref 2–7.15)
NEUTROPHILS NFR BLD: 62.3 % (ref 44–72)
NRBC # BLD AUTO: 0 K/UL
NRBC BLD-RTO: 0 /100 WBC
PLATELET # BLD AUTO: 256 K/UL (ref 164–446)
PMV BLD AUTO: 10.2 FL (ref 9–12.9)
POTASSIUM SERPL-SCNC: 3.6 MMOL/L (ref 3.6–5.5)
RBC # BLD AUTO: 4.48 M/UL (ref 4.2–5.4)
SODIUM SERPL-SCNC: 139 MMOL/L (ref 135–145)
WBC # BLD AUTO: 8.7 K/UL (ref 4.8–10.8)

## 2018-03-13 PROCEDURE — 80048 BASIC METABOLIC PNL TOTAL CA: CPT

## 2018-03-13 PROCEDURE — 85025 COMPLETE CBC W/AUTO DIFF WBC: CPT

## 2018-03-13 PROCEDURE — 36415 COLL VENOUS BLD VENIPUNCTURE: CPT

## 2018-03-14 ENCOUNTER — HOSPITAL ENCOUNTER (INPATIENT)
Facility: MEDICAL CENTER | Age: 38
LOS: 4 days | DRG: 330 | End: 2018-03-18
Attending: SURGERY | Admitting: SURGERY
Payer: COMMERCIAL

## 2018-03-14 DIAGNOSIS — R10.9 POSTOPERATIVE ABDOMINAL PAIN: ICD-10-CM

## 2018-03-14 DIAGNOSIS — G89.18 POSTOPERATIVE ABDOMINAL PAIN: ICD-10-CM

## 2018-03-14 PROCEDURE — 700111 HCHG RX REV CODE 636 W/ 250 OVERRIDE (IP)

## 2018-03-14 PROCEDURE — 500389 HCHG DRAIN, RESERVOIR SUCT JP 100CC: Performed by: SURGERY

## 2018-03-14 PROCEDURE — 0DTL4ZZ RESECTION OF TRANSVERSE COLON, PERCUTANEOUS ENDOSCOPIC APPROACH: ICD-10-PCS | Performed by: SURGERY

## 2018-03-14 PROCEDURE — 502240 HCHG MISC OR SUPPLY RC 0272: Performed by: SURGERY

## 2018-03-14 PROCEDURE — 501664 HCHG TUBING, FILTER STRYKER: Performed by: SURGERY

## 2018-03-14 PROCEDURE — 160036 HCHG PACU - EA ADDL 30 MINS PHASE I: Performed by: SURGERY

## 2018-03-14 PROCEDURE — 700102 HCHG RX REV CODE 250 W/ 637 OVERRIDE(OP): Performed by: SURGERY

## 2018-03-14 PROCEDURE — 0DTK4ZZ RESECTION OF ASCENDING COLON, PERCUTANEOUS ENDOSCOPIC APPROACH: ICD-10-PCS | Performed by: SURGERY

## 2018-03-14 PROCEDURE — 8E0W4CZ ROBOTIC ASSISTED PROCEDURE OF TRUNK REGION, PERCUTANEOUS ENDOSCOPIC APPROACH: ICD-10-PCS | Performed by: SURGERY

## 2018-03-14 PROCEDURE — 700111 HCHG RX REV CODE 636 W/ 250 OVERRIDE (IP): Performed by: SURGERY

## 2018-03-14 PROCEDURE — 500378 HCHG DRAIN, J-VAC ROUND 19FR: Performed by: SURGERY

## 2018-03-14 PROCEDURE — 160035 HCHG PACU - 1ST 60 MINS PHASE I: Performed by: SURGERY

## 2018-03-14 PROCEDURE — A9270 NON-COVERED ITEM OR SERVICE: HCPCS | Performed by: SURGERY

## 2018-03-14 PROCEDURE — 501838 HCHG SUTURE GENERAL: Performed by: SURGERY

## 2018-03-14 PROCEDURE — 700102 HCHG RX REV CODE 250 W/ 637 OVERRIDE(OP)

## 2018-03-14 PROCEDURE — 500868 HCHG NEEDLE, SURGI(VARES): Performed by: SURGERY

## 2018-03-14 PROCEDURE — 160009 HCHG ANES TIME/MIN: Performed by: SURGERY

## 2018-03-14 PROCEDURE — 500042 HCHG BAG, URINARY DRAINAGE (CLOSED): Performed by: SURGERY

## 2018-03-14 PROCEDURE — 160031 HCHG SURGERY MINUTES - 1ST 30 MINS LEVEL 5: Performed by: SURGERY

## 2018-03-14 PROCEDURE — 0D1B4ZP BYPASS ILEUM TO RECTUM, PERCUTANEOUS ENDOSCOPIC APPROACH: ICD-10-PCS | Performed by: SURGERY

## 2018-03-14 PROCEDURE — 160002 HCHG RECOVERY MINUTES (STAT): Performed by: SURGERY

## 2018-03-14 PROCEDURE — 500800 HCHG LAPAROSCOPIC J/L HOOK: Performed by: SURGERY

## 2018-03-14 PROCEDURE — 160042 HCHG SURGERY MINUTES - EA ADDL 1 MIN LEVEL 5: Performed by: SURGERY

## 2018-03-14 PROCEDURE — 501571 HCHG TROCAR, SEPARATOR 12X100: Performed by: SURGERY

## 2018-03-14 PROCEDURE — 700101 HCHG RX REV CODE 250

## 2018-03-14 PROCEDURE — 160048 HCHG OR STATISTICAL LEVEL 1-5: Performed by: SURGERY

## 2018-03-14 PROCEDURE — A9270 NON-COVERED ITEM OR SERVICE: HCPCS

## 2018-03-14 PROCEDURE — 501570 HCHG TROCAR, SEPARATOR: Performed by: SURGERY

## 2018-03-14 PROCEDURE — 502704 HCHG DEVICE, LIGASURE IMPACT: Performed by: SURGERY

## 2018-03-14 PROCEDURE — 770006 HCHG ROOM/CARE - MED/SURG/GYN SEMI*

## 2018-03-14 PROCEDURE — 501433 HCHG STAPLER, GIA MULTIFIRE 60/80: Performed by: SURGERY

## 2018-03-14 PROCEDURE — 0DTN4ZZ RESECTION OF SIGMOID COLON, PERCUTANEOUS ENDOSCOPIC APPROACH: ICD-10-PCS | Performed by: SURGERY

## 2018-03-14 PROCEDURE — 700105 HCHG RX REV CODE 258: Performed by: SURGERY

## 2018-03-14 PROCEDURE — 502714 HCHG ROBOTIC SURGERY SERVICES: Performed by: SURGERY

## 2018-03-14 PROCEDURE — 88307 TISSUE EXAM BY PATHOLOGIST: CPT

## 2018-03-14 RX ORDER — LIDOCAINE AND PRILOCAINE 25; 25 MG/G; MG/G
1 CREAM TOPICAL
Status: CANCELLED | OUTPATIENT
Start: 2018-03-14 | End: 2018-03-15

## 2018-03-14 RX ORDER — LEVOTHYROXINE SODIUM 0.1 MG/1
100 TABLET ORAL
Status: DISCONTINUED | OUTPATIENT
Start: 2018-03-15 | End: 2018-03-18 | Stop reason: HOSPADM

## 2018-03-14 RX ORDER — SODIUM CHLORIDE 9 MG/ML
INJECTION, SOLUTION INTRAVENOUS CONTINUOUS
Status: DISCONTINUED | OUTPATIENT
Start: 2018-03-14 | End: 2018-03-18 | Stop reason: HOSPADM

## 2018-03-14 RX ORDER — SODIUM CHLORIDE, SODIUM LACTATE, POTASSIUM CHLORIDE, CALCIUM CHLORIDE 600; 310; 30; 20 MG/100ML; MG/100ML; MG/100ML; MG/100ML
1000 INJECTION, SOLUTION INTRAVENOUS CONTINUOUS
Status: DISCONTINUED | OUTPATIENT
Start: 2018-03-14 | End: 2018-03-16

## 2018-03-14 RX ORDER — OXYCODONE HYDROCHLORIDE 10 MG/1
10 TABLET ORAL
Status: DISCONTINUED | OUTPATIENT
Start: 2018-03-14 | End: 2018-03-15

## 2018-03-14 RX ORDER — BUPIVACAINE HYDROCHLORIDE AND EPINEPHRINE 5; 5 MG/ML; UG/ML
INJECTION, SOLUTION EPIDURAL; INTRACAUDAL; PERINEURAL
Status: DISCONTINUED | OUTPATIENT
Start: 2018-03-14 | End: 2018-03-14 | Stop reason: HOSPADM

## 2018-03-14 RX ORDER — DIPHENHYDRAMINE HYDROCHLORIDE 50 MG/ML
INJECTION INTRAMUSCULAR; INTRAVENOUS
Status: COMPLETED
Start: 2018-03-14 | End: 2018-03-14

## 2018-03-14 RX ORDER — SCOLOPAMINE TRANSDERMAL SYSTEM 1 MG/1
PATCH, EXTENDED RELEASE TRANSDERMAL
Status: COMPLETED
Start: 2018-03-14 | End: 2018-03-14

## 2018-03-14 RX ORDER — LIDOCAINE HYDROCHLORIDE 10 MG/ML
0.5 INJECTION, SOLUTION INFILTRATION; PERINEURAL
Status: CANCELLED | OUTPATIENT
Start: 2018-03-14 | End: 2018-03-15

## 2018-03-14 RX ORDER — ACETAMINOPHEN 500 MG
1000 TABLET ORAL EVERY 6 HOURS
Status: DISCONTINUED | OUTPATIENT
Start: 2018-03-15 | End: 2018-03-18 | Stop reason: HOSPADM

## 2018-03-14 RX ORDER — ACETAMINOPHEN 500 MG
TABLET ORAL
Status: COMPLETED
Start: 2018-03-14 | End: 2018-03-14

## 2018-03-14 RX ORDER — OXYCODONE HYDROCHLORIDE 5 MG/1
5 TABLET ORAL
Status: DISCONTINUED | OUTPATIENT
Start: 2018-03-14 | End: 2018-03-17

## 2018-03-14 RX ORDER — CELECOXIB 200 MG/1
CAPSULE ORAL
Status: COMPLETED
Start: 2018-03-14 | End: 2018-03-14

## 2018-03-14 RX ORDER — ONDANSETRON 2 MG/ML
4 INJECTION INTRAMUSCULAR; INTRAVENOUS EVERY 4 HOURS PRN
Status: DISCONTINUED | OUTPATIENT
Start: 2018-03-14 | End: 2018-03-18 | Stop reason: HOSPADM

## 2018-03-14 RX ORDER — GABAPENTIN 300 MG/1
CAPSULE ORAL
Status: COMPLETED
Start: 2018-03-14 | End: 2018-03-14

## 2018-03-14 RX ORDER — DOCUSATE SODIUM 100 MG/1
100 CAPSULE, LIQUID FILLED ORAL 2 TIMES DAILY
Status: DISCONTINUED | OUTPATIENT
Start: 2018-03-14 | End: 2018-03-18 | Stop reason: HOSPADM

## 2018-03-14 RX ORDER — MIDAZOLAM HYDROCHLORIDE 1 MG/ML
INJECTION INTRAMUSCULAR; INTRAVENOUS
Status: COMPLETED
Start: 2018-03-14 | End: 2018-03-14

## 2018-03-14 RX ORDER — OXYCODONE HCL 5 MG/5 ML
SOLUTION, ORAL ORAL
Status: COMPLETED
Start: 2018-03-14 | End: 2018-03-14

## 2018-03-14 RX ADMIN — GABAPENTIN 300 MG: 300 CAPSULE ORAL at 17:07

## 2018-03-14 RX ADMIN — SCOPALAMINE 1 PATCH: 1 PATCH, EXTENDED RELEASE TRANSDERMAL at 16:45

## 2018-03-14 RX ADMIN — MIDAZOLAM 1 MG: 1 INJECTION INTRAMUSCULAR; INTRAVENOUS at 21:35

## 2018-03-14 RX ADMIN — FENTANYL CITRATE 50 MCG: 50 INJECTION, SOLUTION INTRAMUSCULAR; INTRAVENOUS at 21:35

## 2018-03-14 RX ADMIN — FENTANYL CITRATE 50 MCG: 50 INJECTION, SOLUTION INTRAMUSCULAR; INTRAVENOUS at 21:40

## 2018-03-14 RX ADMIN — CELECOXIB 200 MG: 200 CAPSULE ORAL at 17:07

## 2018-03-14 RX ADMIN — ACETAMINOPHEN 1000 MG: 500 TABLET ORAL at 23:50

## 2018-03-14 RX ADMIN — FENTANYL CITRATE 50 MCG: 50 INJECTION, SOLUTION INTRAMUSCULAR; INTRAVENOUS at 21:30

## 2018-03-14 RX ADMIN — FENTANYL CITRATE 50 MCG: 50 INJECTION, SOLUTION INTRAMUSCULAR; INTRAVENOUS at 21:25

## 2018-03-14 RX ADMIN — ACETAMINOPHEN 1000 MG: 500 TABLET, FILM COATED ORAL at 17:06

## 2018-03-14 RX ADMIN — MIDAZOLAM 1 MG: 1 INJECTION INTRAMUSCULAR; INTRAVENOUS at 21:20

## 2018-03-14 RX ADMIN — OXYCODONE HYDROCHLORIDE 10 MG: 5 SOLUTION ORAL at 22:00

## 2018-03-14 RX ADMIN — DIPHENHYDRAMINE HYDROCHLORIDE 12.5 MG: 50 INJECTION INTRAMUSCULAR; INTRAVENOUS at 22:12

## 2018-03-14 RX ADMIN — SODIUM CHLORIDE: 9 INJECTION, SOLUTION INTRAVENOUS at 23:39

## 2018-03-14 RX ADMIN — FENTANYL CITRATE 50 MCG: 50 INJECTION, SOLUTION INTRAMUSCULAR; INTRAVENOUS at 21:45

## 2018-03-14 RX ADMIN — ONDANSETRON HYDROCHLORIDE 4 MG: 2 INJECTION, SOLUTION INTRAMUSCULAR; INTRAVENOUS at 23:26

## 2018-03-14 ASSESSMENT — PAIN SCALES - GENERAL
PAINLEVEL_OUTOF10: 8
PAINLEVEL_OUTOF10: ASSUMED PAIN PRESENT
PAINLEVEL_OUTOF10: ASSUMED PAIN PRESENT
PAINLEVEL_OUTOF10: 8
PAINLEVEL_OUTOF10: ASSUMED PAIN PRESENT

## 2018-03-14 ASSESSMENT — LIFESTYLE VARIABLES: EVER_SMOKED: NEVER

## 2018-03-14 ASSESSMENT — COPD QUESTIONNAIRES
HAVE YOU SMOKED AT LEAST 100 CIGARETTES IN YOUR ENTIRE LIFE: NO/DON'T KNOW
DURING THE PAST 4 WEEKS HOW MUCH DID YOU FEEL SHORT OF BREATH: NONE/LITTLE OF THE TIME
DO YOU EVER COUGH UP ANY MUCUS OR PHLEGM?: NO/ONLY WITH OCCASIONAL COLDS OR INFECTIONS
COPD SCREENING SCORE: 0

## 2018-03-15 LAB
ANION GAP SERPL CALC-SCNC: 8 MMOL/L (ref 0–11.9)
BASOPHILS # BLD AUTO: 0.6 % (ref 0–1.8)
BASOPHILS # BLD: 0.09 K/UL (ref 0–0.12)
BUN SERPL-MCNC: 10 MG/DL (ref 8–22)
CALCIUM SERPL-MCNC: 8.4 MG/DL (ref 8.5–10.5)
CHLORIDE SERPL-SCNC: 104 MMOL/L (ref 96–112)
CO2 SERPL-SCNC: 24 MMOL/L (ref 20–33)
CREAT SERPL-MCNC: 0.9 MG/DL (ref 0.5–1.4)
EOSINOPHIL # BLD AUTO: 0.01 K/UL (ref 0–0.51)
EOSINOPHIL NFR BLD: 0.1 % (ref 0–6.9)
ERYTHROCYTE [DISTWIDTH] IN BLOOD BY AUTOMATED COUNT: 42.2 FL (ref 35.9–50)
GLUCOSE SERPL-MCNC: 94 MG/DL (ref 65–99)
HCT VFR BLD AUTO: 36 % (ref 37–47)
HGB BLD-MCNC: 12 G/DL (ref 12–16)
IMM GRANULOCYTES # BLD AUTO: 0.06 K/UL (ref 0–0.11)
IMM GRANULOCYTES NFR BLD AUTO: 0.4 % (ref 0–0.9)
LYMPHOCYTES # BLD AUTO: 0.58 K/UL (ref 1–4.8)
LYMPHOCYTES NFR BLD: 3.9 % (ref 22–41)
MCH RBC QN AUTO: 32.1 PG (ref 27–33)
MCHC RBC AUTO-ENTMCNC: 33.3 G/DL (ref 33.6–35)
MCV RBC AUTO: 96.3 FL (ref 81.4–97.8)
MONOCYTES # BLD AUTO: 0.91 K/UL (ref 0–0.85)
MONOCYTES NFR BLD AUTO: 6.1 % (ref 0–13.4)
NEUTROPHILS # BLD AUTO: 13.27 K/UL (ref 2–7.15)
NEUTROPHILS NFR BLD: 88.9 % (ref 44–72)
NRBC # BLD AUTO: 0 K/UL
NRBC BLD-RTO: 0 /100 WBC
PLATELET # BLD AUTO: 216 K/UL (ref 164–446)
PMV BLD AUTO: 10.2 FL (ref 9–12.9)
POTASSIUM SERPL-SCNC: 5.6 MMOL/L (ref 3.6–5.5)
RBC # BLD AUTO: 3.74 M/UL (ref 4.2–5.4)
SODIUM SERPL-SCNC: 136 MMOL/L (ref 135–145)
WBC # BLD AUTO: 14.9 K/UL (ref 4.8–10.8)

## 2018-03-15 PROCEDURE — 85025 COMPLETE CBC W/AUTO DIFF WBC: CPT

## 2018-03-15 PROCEDURE — 700105 HCHG RX REV CODE 258: Performed by: SURGERY

## 2018-03-15 PROCEDURE — 80048 BASIC METABOLIC PNL TOTAL CA: CPT

## 2018-03-15 PROCEDURE — 770006 HCHG ROOM/CARE - MED/SURG/GYN SEMI*

## 2018-03-15 PROCEDURE — 700102 HCHG RX REV CODE 250 W/ 637 OVERRIDE(OP): Performed by: NURSE PRACTITIONER

## 2018-03-15 PROCEDURE — 700111 HCHG RX REV CODE 636 W/ 250 OVERRIDE (IP): Performed by: SURGERY

## 2018-03-15 PROCEDURE — 700112 HCHG RX REV CODE 229: Performed by: SURGERY

## 2018-03-15 PROCEDURE — A9270 NON-COVERED ITEM OR SERVICE: HCPCS | Performed by: NURSE PRACTITIONER

## 2018-03-15 PROCEDURE — A9270 NON-COVERED ITEM OR SERVICE: HCPCS | Performed by: SURGERY

## 2018-03-15 PROCEDURE — 36415 COLL VENOUS BLD VENIPUNCTURE: CPT

## 2018-03-15 PROCEDURE — 700102 HCHG RX REV CODE 250 W/ 637 OVERRIDE(OP): Performed by: SURGERY

## 2018-03-15 RX ORDER — DIPHENHYDRAMINE HCL 25 MG
25-50 TABLET ORAL EVERY 6 HOURS PRN
Status: DISCONTINUED | OUTPATIENT
Start: 2018-03-15 | End: 2018-03-18 | Stop reason: HOSPADM

## 2018-03-15 RX ORDER — OXYCODONE HYDROCHLORIDE 5 MG/1
15 TABLET ORAL
Status: DISCONTINUED | OUTPATIENT
Start: 2018-03-15 | End: 2018-03-18 | Stop reason: HOSPADM

## 2018-03-15 RX ORDER — OXYCODONE HYDROCHLORIDE 10 MG/1
10 TABLET ORAL EVERY 4 HOURS PRN
Status: COMPLETED | OUTPATIENT
Start: 2018-03-15 | End: 2018-03-15

## 2018-03-15 RX ADMIN — ONDANSETRON HYDROCHLORIDE 4 MG: 2 INJECTION, SOLUTION INTRAMUSCULAR; INTRAVENOUS at 05:09

## 2018-03-15 RX ADMIN — ACETAMINOPHEN 1000 MG: 500 TABLET ORAL at 17:41

## 2018-03-15 RX ADMIN — SODIUM CHLORIDE: 9 INJECTION, SOLUTION INTRAVENOUS at 05:26

## 2018-03-15 RX ADMIN — DOCUSATE SODIUM 100 MG: 100 CAPSULE ORAL at 22:20

## 2018-03-15 RX ADMIN — OXYCODONE HYDROCHLORIDE 15 MG: 5 TABLET ORAL at 14:34

## 2018-03-15 RX ADMIN — PIPERACILLIN SODIUM AND TAZOBACTAM SODIUM 4.5 G: 4; .5 INJECTION, POWDER, FOR SOLUTION INTRAVENOUS at 22:20

## 2018-03-15 RX ADMIN — FENTANYL CITRATE 50 MCG: 50 INJECTION, SOLUTION INTRAMUSCULAR; INTRAVENOUS at 01:46

## 2018-03-15 RX ADMIN — ENOXAPARIN SODIUM 30 MG: 100 INJECTION SUBCUTANEOUS at 08:59

## 2018-03-15 RX ADMIN — FENTANYL CITRATE 100 MCG: 50 INJECTION, SOLUTION INTRAMUSCULAR; INTRAVENOUS at 15:36

## 2018-03-15 RX ADMIN — FENTANYL CITRATE 100 MCG: 50 INJECTION, SOLUTION INTRAMUSCULAR; INTRAVENOUS at 18:47

## 2018-03-15 RX ADMIN — FENTANYL CITRATE 50 MCG: 50 INJECTION, SOLUTION INTRAMUSCULAR; INTRAVENOUS at 09:06

## 2018-03-15 RX ADMIN — FENTANYL CITRATE 100 MCG: 50 INJECTION, SOLUTION INTRAMUSCULAR; INTRAVENOUS at 12:21

## 2018-03-15 RX ADMIN — ACETAMINOPHEN 1000 MG: 500 TABLET ORAL at 11:04

## 2018-03-15 RX ADMIN — OXYCODONE HYDROCHLORIDE 15 MG: 5 TABLET ORAL at 11:04

## 2018-03-15 RX ADMIN — PIPERACILLIN SODIUM AND TAZOBACTAM SODIUM 4.5 G: 4; .5 INJECTION, POWDER, FOR SOLUTION INTRAVENOUS at 01:46

## 2018-03-15 RX ADMIN — LEVOTHYROXINE SODIUM 100 MCG: 100 TABLET ORAL at 05:32

## 2018-03-15 RX ADMIN — OXYCODONE HYDROCHLORIDE 15 MG: 5 TABLET ORAL at 20:42

## 2018-03-15 RX ADMIN — PIPERACILLIN SODIUM AND TAZOBACTAM SODIUM 4.5 G: 4; .5 INJECTION, POWDER, FOR SOLUTION INTRAVENOUS at 05:19

## 2018-03-15 RX ADMIN — PIPERACILLIN SODIUM AND TAZOBACTAM SODIUM 4.5 G: 4; .5 INJECTION, POWDER, FOR SOLUTION INTRAVENOUS at 12:25

## 2018-03-15 RX ADMIN — OXYCODONE HYDROCHLORIDE 15 MG: 5 TABLET ORAL at 23:54

## 2018-03-15 RX ADMIN — OXYCODONE HYDROCHLORIDE 15 MG: 5 TABLET ORAL at 07:33

## 2018-03-15 RX ADMIN — OXYCODONE HYDROCHLORIDE 10 MG: 10 TABLET ORAL at 00:31

## 2018-03-15 RX ADMIN — DIPHENHYDRAMINE HCL 50 MG: 25 TABLET ORAL at 22:20

## 2018-03-15 RX ADMIN — DOCUSATE SODIUM 100 MG: 100 CAPSULE ORAL at 09:00

## 2018-03-15 RX ADMIN — ACETAMINOPHEN 1000 MG: 500 TABLET ORAL at 05:19

## 2018-03-15 RX ADMIN — FENTANYL CITRATE 100 MCG: 50 INJECTION, SOLUTION INTRAMUSCULAR; INTRAVENOUS at 22:21

## 2018-03-15 RX ADMIN — ONDANSETRON HYDROCHLORIDE 4 MG: 2 INJECTION, SOLUTION INTRAMUSCULAR; INTRAVENOUS at 18:52

## 2018-03-15 RX ADMIN — FENTANYL CITRATE 50 MCG: 50 INJECTION, SOLUTION INTRAMUSCULAR; INTRAVENOUS at 05:18

## 2018-03-15 RX ADMIN — OXYCODONE HYDROCHLORIDE 15 MG: 5 TABLET ORAL at 17:42

## 2018-03-15 RX ADMIN — OXYCODONE HYDROCHLORIDE 15 MG: 5 TABLET ORAL at 03:51

## 2018-03-15 ASSESSMENT — PAIN SCALES - GENERAL
PAINLEVEL_OUTOF10: 8
PAINLEVEL_OUTOF10: 6
PAINLEVEL_OUTOF10: 8
PAINLEVEL_OUTOF10: 8
PAINLEVEL_OUTOF10: 7
PAINLEVEL_OUTOF10: 8
PAINLEVEL_OUTOF10: 7
PAINLEVEL_OUTOF10: 8
PAINLEVEL_OUTOF10: 8
PAINLEVEL_OUTOF10: 9
PAINLEVEL_OUTOF10: 8
PAINLEVEL_OUTOF10: 8
PAINLEVEL_OUTOF10: 7
PAINLEVEL_OUTOF10: 8
PAINLEVEL_OUTOF10: 8
PAINLEVEL_OUTOF10: 7

## 2018-03-15 ASSESSMENT — PATIENT HEALTH QUESTIONNAIRE - PHQ9
1. LITTLE INTEREST OR PLEASURE IN DOING THINGS: NOT AT ALL
SUM OF ALL RESPONSES TO PHQ9 QUESTIONS 1 AND 2: 0
SUM OF ALL RESPONSES TO PHQ QUESTIONS 1-9: 0
2. FEELING DOWN, DEPRESSED, IRRITABLE, OR HOPELESS: NOT AT ALL

## 2018-03-15 ASSESSMENT — LIFESTYLE VARIABLES
EVER HAD A DRINK FIRST THING IN THE MORNING TO STEADY YOUR NERVES TO GET RID OF A HANGOVER: NO
TOTAL SCORE: 0
EVER FELT BAD OR GUILTY ABOUT YOUR DRINKING: NO
HOW MANY TIMES IN THE PAST YEAR HAVE YOU HAD 5 OR MORE DRINKS IN A DAY: 0
HAVE PEOPLE ANNOYED YOU BY CRITICIZING YOUR DRINKING: NO
ON A TYPICAL DAY WHEN YOU DRINK ALCOHOL HOW MANY DRINKS DO YOU HAVE: 1
ALCOHOL_USE: YES
CONSUMPTION TOTAL: NEGATIVE
HAVE YOU EVER FELT YOU SHOULD CUT DOWN ON YOUR DRINKING: NO
TOTAL SCORE: 0
EVER_SMOKED: NEVER
AVERAGE NUMBER OF DAYS PER WEEK YOU HAVE A DRINK CONTAINING ALCOHOL: 4
TOTAL SCORE: 0

## 2018-03-15 ASSESSMENT — ENCOUNTER SYMPTOMS
VOMITING: 0
BLOOD IN STOOL: 1
NAUSEA: 1
ABDOMINAL PAIN: 1

## 2018-03-15 NOTE — OR NURSING
Patient to the floor, awake, alert, and oriented.  Patient denies nausea, pain is controlled at this time. 1 blue patient belonging bag to the floor.

## 2018-03-15 NOTE — OP REPORT
DATE OF SERVICE:  03/14/2018    PREOPERATIVE DIAGNOSIS:  Colonic inertia.    POSTOPERATIVE DIAGNOSIS:  Colonic inertia.    OPERATION:  Subtotal colectomy with ileoproctostomy, robotic-assisted   laparoscopic.    SURGEON:  Tigre Javed MD    ANESTHESIA:  Jamie Desai MD    ASSISTANT:  Rosalino Roberts MD    OPERATIVE NOTE:  The patient is a 37-year-old female who presents with   increasing problems of colonic inertia.  She was resistant to multiple   medication regimens in addition to ____.  The patient was ultimately felt to   be a candidate for a subtotal colectomy with ileoproctostomy.  The risks,   possible complications of such operation were discussed with her in detail.    She opted to proceed with robotic platform.  She underwent a gavage-type bowel   prep.  She received prophylactic antibiotics.  She presented for surgical   intervention.  She had a satisfactory preoperative evaluation.  She was taken   to the operating room and placed under anesthesia by Dr. Desai.  Once   anesthetized, patient had her perineum prepped with Betadine in low   Trendelenburg position.  The abdomen was prepped with ChloraPrep.    Prophylactic antibiotics were administered.  Sequential stockings applied as   antiembolism prophylaxis.  Patient had a Matthew catheter inserted to down   drain.  The bladder was emptied.  Timeout was affected.  A solution of 0.5%   Marcaine with epinephrine was liberally infiltrated into all wounds.  A right   lower quadrant muscle splitting incision was made through approximately an   inch and a half incision.  This was carried down through the muscle layers to   the peritoneum.  The peritoneum was opened sharply.  This was bluntly   retracted.  A wound protector was inserted into this wound and this was   capped.  The patient had pneumoinsufflation.  A 15 mm robotic trocar was   placed through this aperture.  This directed placement of 3 additional robotic   trocars, 1 in the right mid  abdomen, 1 in the right upper quadrant and 1 in   the left epigastrium.  These were all positioned.  The abdomen was surveyed.    Patient was positioned on the right side down.  The Xi robot was brought in   and docked.  Instrumentation was introduced.  Dr. Javed retired to the   console.  Exploration of the abdomen revealed the transverse colon and left   colon up to the splenic flexure.  The splenic flexure was carefully taken down   by division of the leaves of attachments to the ligament.  Ultimately, the   splenic flexure was completely mobilized.  The transverse colon was mobilized   by division of the omentum along its antimesenteric border.  This was affected   with a vessel sealer.  The patient's right colon had been previously   partially removed for cecopexy.  The anastomosis from the previous resection   was identified.  The transverse mesocolon was then taken down using the vessel   sealer.  Patient had the left colon mobilized along the line of Toldt using   sharp dissection.  This was mobilized down to the rectum.  The rectum was   brought up below the peritoneal reflection.  Here, the mesentery was divided   using the vessel sealing device.  An aperture was made in the mesorectum.  The   rectum was then divided using the robotic stapling device.  Once this was   completely divided, the mesentery of the left colon was then taken down using   the vessel sealer close to the colon.  Ultimately, the colon was freed from   all attachments.  The transected end of the distal sigmoid was then brought   out through the wound protector.  This allowed the entire colon to be   delivered externally.  There was a small tear in the colon with some fecal   spillage, most of which occurred outside the abdomen with some in the abdomen.    The patient had the ileocolostomy brought out.  The patient had the ileum   divided using electrocautery.  The mesentery was taken down using a LigaSure.    Hemostasis was assured.   The colon specimen was sent to pathology for   permanent section.  A 29 mm anvil was then placed into the small bowel.  A   pursestring was placed with 2-0 Prolene suture and tied securely.  The anvil   was then returned to the abdominal cavity.  Patient had copious irrigation of   the abdominal cavity.  Hemostasis was assured.  The patient had this   evacuated.  Two liters of irrigation were introduced and recovered.  Patient   then had the EEA introduced transanally.  This was brought up into the rectum.    The trocar on the device was opened and deployed.  The anvil was attached to   it.  This was closed and then an anastomosis was created.  Patient had the   anastomosis tested with pneumoinsufflation and it appeared to be airtight, the   EEA device having been removed.  Patient had the urine inspected and was   clear.  The patient had irrigation again another liter.  This was   decompressed.  A 19-Turkish round Luis drain was introduced through the right   upper quadrant trocar site.  This was deployed in the pelvis.  It was brought   out through the mid abdominal wound.  It was sewn in place using Vicryl   suture.  Patient now had pneumoinsufflation collapse.  The wound protector was   brought out.  The muscle splitting incision was closed in 2 layers using   running 0 Vicryl suture.  All wounds were irrigated.  The superficial fascia   was closed in the right lower quadrant incision using 3-0 Vicryl suture.  The   skin was closed using automatic stapling device and sterile dressings were   applied.  The drain was connected to bulb suction.  There was serosanguineous   drainage and this appeared to stop at about 30 mL.  It was elected to leave   the drain in place.  The Matthew catheter was removed.  The patient was   awakened, extubated and taken to the recovery room in stable and satisfactory   condition, having tolerated the procedure well without apparent complication.    Estimated blood loss was 200 mL.   Sponge, instrument, needle counts reported   as correct.  The patient will be admitted for postoperative care.       ____________________________________     MD CHRISTOPHER PAGE / DEWAYNE    DD:  03/14/2018 21:14:10  DT:  03/14/2018 23:06:52    D#:  [id number]  Job#:  [id number]    cc: SIOMARA MALAVE MD, Rosalino Roberts MD

## 2018-03-15 NOTE — PROGRESS NOTES
Bedside report received.  Assessment complete.  A&O x 4. Patient calls appropriately.  Patient up with stand-by assist. Bed alarm refused. Pt educated.   Patient has 8/10 pain. Pain to abd.   Denies N&V. Tolerating clear liquid diet.  Surgical incisions to abd. Dressing in place, CDI.  + void, + flatus  Patient denies SOB.  SCD's refused. AUNDREA hose refused.  Patient pain is not well controlled. Pt states she would like to re-try morphine, despite previous allergy.   Review plan with of care with patient. Call light and personal belongings with in reach. Hourly rounding in place. All needs met at this time.

## 2018-03-15 NOTE — PROGRESS NOTES
"Trauma / Surgical Progress Note  Interval Events:  Pain control issues   Not opiod naive   VSS   Creatinine ok , hgb driftt   Modest PANCHO output   lovenox ok   Try clears  Cont ABx for fecal contamination      Review of Systems   Gastrointestinal: Positive for abdominal pain, blood in stool and nausea. Negative for vomiting.   All other systems reviewed and are negative.      Vitals:  Blood pressure 117/63, pulse 96, temperature 37.1 °C (98.8 °F), resp. rate 18, height 1.575 m (5' 2\"), weight 55.2 kg (121 lb 11.1 oz), last menstrual period 2011, SpO2 95 %, not currently breastfeeding.  Temp (24hrs), Av.8 °C (98.2 °F), Min:36.3 °C (97.4 °F), Max:37.1 °C (98.8 °F)      IO:       Exam:  Respiratory: unlabored respirations, no intercostal retractions or accessory muscle use  Neuro: no focal deficits noted  Cardiovascular: regular rate and rhythm  GI: diffusely tender  Other: General: mild distress    Labs:  Recent Results (from the past 24 hour(s))   CBC with Differential: Tomorrow AM    Collection Time: 03/15/18  3:35 AM   Result Value Ref Range    WBC 14.9 (H) 4.8 - 10.8 K/uL    RBC 3.74 (L) 4.20 - 5.40 M/uL    Hemoglobin 12.0 12.0 - 16.0 g/dL    Hematocrit 36.0 (L) 37.0 - 47.0 %    MCV 96.3 81.4 - 97.8 fL    MCH 32.1 27.0 - 33.0 pg    MCHC 33.3 (L) 33.6 - 35.0 g/dL    RDW 42.2 35.9 - 50.0 fL    Platelet Count 216 164 - 446 K/uL    MPV 10.2 9.0 - 12.9 fL    Neutrophils-Polys 88.90 (H) 44.00 - 72.00 %    Lymphocytes 3.90 (L) 22.00 - 41.00 %    Monocytes 6.10 0.00 - 13.40 %    Eosinophils 0.10 0.00 - 6.90 %    Basophils 0.60 0.00 - 1.80 %    Immature Granulocytes 0.40 0.00 - 0.90 %    Nucleated RBC 0.00 /100 WBC    Neutrophils (Absolute) 13.27 (H) 2.00 - 7.15 K/uL    Lymphs (Absolute) 0.58 (L) 1.00 - 4.80 K/uL    Monos (Absolute) 0.91 (H) 0.00 - 0.85 K/uL    Eos (Absolute) 0.01 0.00 - 0.51 K/uL    Baso (Absolute) 0.09 0.00 - 0.12 K/uL    Immature Granulocytes (abs) 0.06 0.00 - 0.11 K/uL    NRBC (Absolute) 0.00 " K/uL   Basic Metabolic Panel (BMP): Tomorrow AM    Collection Time: 03/15/18  3:35 AM   Result Value Ref Range    Sodium 136 135 - 145 mmol/L    Potassium 5.6 (H) 3.6 - 5.5 mmol/L    Chloride 104 96 - 112 mmol/L    Co2 24 20 - 33 mmol/L    Glucose 94 65 - 99 mg/dL    Bun 10 8 - 22 mg/dL    Creatinine 0.90 0.50 - 1.40 mg/dL    Calcium 8.4 (L) 8.5 - 10.5 mg/dL    Anion Gap 8.0 0.0 - 11.9   ESTIMATED GFR    Collection Time: 03/15/18  3:35 AM   Result Value Ref Range    GFR If African American >60 >60 mL/min/1.73 m 2    GFR If Non African American >60 >60 mL/min/1.73 m 2       Problem and Plan:  There are no active hospital problems to display for this patient.      Core Measures & Quality Metrics:  Labs reviewed  Matthew catheter: No Matthew      DVT Prophylaxis: Enoxaparin (Lovenox)  DVT prophylaxis - mechanical: SCDs  Ulcer prophylaxis: Not indicated  Antibiotics: Treating fecal contamination

## 2018-03-15 NOTE — OR NURSING
Patient arrived to PACU 3B, on 3 lpm N/C. Patient connected to monitors,m side rails up x 2.  Bedside report received from Dr. Desai and JERROD Dunbar

## 2018-03-15 NOTE — CARE PLAN
Problem: Pain Management  Goal: Pain level will decrease to patient's comfort goal  Outcome: PROGRESSING AS EXPECTED  Pain medication given as ordered to help pt manage pain. Ice and heat in use as well to help pt manage pain.

## 2018-03-15 NOTE — CARE PLAN
Problem: Safety  Goal: Will remain free from injury  Outcome: PROGRESSING AS EXPECTED  Assisted pt to the bathroom     Problem: Pain Management  Goal: Pain level will decrease to patient's comfort goal  Outcome: PROGRESSING SLOWER THAN EXPECTED  Paged MD regarding uncontrolled pain

## 2018-03-15 NOTE — PROGRESS NOTES
Two RN skin check complete with Nhi ELDER.   Dressing over surgical incision in the right upper quadrant of the abdomen, dressing is clean, dry, and intact.   No other areas of concern noted.

## 2018-03-15 NOTE — PROGRESS NOTES
Pt arrived to floor from PACU with transport.   Report received from PACU RNAngel prior to pt arrival.   Pt reporting 8/10 pain, medication provided. A phone call was also made to the nurse practitioner regarding pain control, new orders were received.   Pt oriented to unit, room, and change of shift schedule.   Call light is within reach, personal belongings are within reach, bed is locked and in the lowest position, and hourly rounding is in place.

## 2018-03-16 LAB
ANION GAP SERPL CALC-SCNC: 7 MMOL/L (ref 0–11.9)
BUN SERPL-MCNC: 7 MG/DL (ref 8–22)
CALCIUM SERPL-MCNC: 8.7 MG/DL (ref 8.5–10.5)
CHLORIDE SERPL-SCNC: 102 MMOL/L (ref 96–112)
CO2 SERPL-SCNC: 26 MMOL/L (ref 20–33)
CREAT SERPL-MCNC: 0.72 MG/DL (ref 0.5–1.4)
GLUCOSE SERPL-MCNC: 127 MG/DL (ref 65–99)
POTASSIUM SERPL-SCNC: 3.8 MMOL/L (ref 3.6–5.5)
SODIUM SERPL-SCNC: 135 MMOL/L (ref 135–145)

## 2018-03-16 PROCEDURE — 700112 HCHG RX REV CODE 229: Performed by: SURGERY

## 2018-03-16 PROCEDURE — 700102 HCHG RX REV CODE 250 W/ 637 OVERRIDE(OP): Performed by: SURGERY

## 2018-03-16 PROCEDURE — 700102 HCHG RX REV CODE 250 W/ 637 OVERRIDE(OP): Performed by: NURSE PRACTITIONER

## 2018-03-16 PROCEDURE — A9270 NON-COVERED ITEM OR SERVICE: HCPCS | Performed by: SURGERY

## 2018-03-16 PROCEDURE — 770006 HCHG ROOM/CARE - MED/SURG/GYN SEMI*

## 2018-03-16 PROCEDURE — A9270 NON-COVERED ITEM OR SERVICE: HCPCS | Performed by: NURSE PRACTITIONER

## 2018-03-16 PROCEDURE — 700111 HCHG RX REV CODE 636 W/ 250 OVERRIDE (IP): Performed by: SURGERY

## 2018-03-16 PROCEDURE — 36415 COLL VENOUS BLD VENIPUNCTURE: CPT

## 2018-03-16 PROCEDURE — 700105 HCHG RX REV CODE 258: Performed by: SURGERY

## 2018-03-16 PROCEDURE — 80048 BASIC METABOLIC PNL TOTAL CA: CPT

## 2018-03-16 RX ADMIN — OXYCODONE HYDROCHLORIDE 15 MG: 5 TABLET ORAL at 10:23

## 2018-03-16 RX ADMIN — FENTANYL CITRATE 100 MCG: 50 INJECTION, SOLUTION INTRAMUSCULAR; INTRAVENOUS at 11:05

## 2018-03-16 RX ADMIN — PIPERACILLIN SODIUM AND TAZOBACTAM SODIUM 4.5 G: 4; .5 INJECTION, POWDER, FOR SOLUTION INTRAVENOUS at 14:09

## 2018-03-16 RX ADMIN — FENTANYL CITRATE 100 MCG: 50 INJECTION, SOLUTION INTRAMUSCULAR; INTRAVENOUS at 01:21

## 2018-03-16 RX ADMIN — PIPERACILLIN SODIUM AND TAZOBACTAM SODIUM 4.5 G: 4; .5 INJECTION, POWDER, FOR SOLUTION INTRAVENOUS at 05:33

## 2018-03-16 RX ADMIN — OXYCODONE HYDROCHLORIDE 15 MG: 5 TABLET ORAL at 05:55

## 2018-03-16 RX ADMIN — OXYCODONE HYDROCHLORIDE 15 MG: 5 TABLET ORAL at 20:25

## 2018-03-16 RX ADMIN — ACETAMINOPHEN 1000 MG: 500 TABLET ORAL at 12:22

## 2018-03-16 RX ADMIN — LEVOTHYROXINE SODIUM 100 MCG: 100 TABLET ORAL at 05:55

## 2018-03-16 RX ADMIN — DOCUSATE SODIUM 100 MG: 100 CAPSULE ORAL at 10:27

## 2018-03-16 RX ADMIN — ACETAMINOPHEN 1000 MG: 500 TABLET ORAL at 05:55

## 2018-03-16 RX ADMIN — OXYCODONE HYDROCHLORIDE 15 MG: 5 TABLET ORAL at 23:38

## 2018-03-16 RX ADMIN — FENTANYL CITRATE 100 MCG: 50 INJECTION, SOLUTION INTRAMUSCULAR; INTRAVENOUS at 07:41

## 2018-03-16 RX ADMIN — FENTANYL CITRATE 100 MCG: 50 INJECTION, SOLUTION INTRAMUSCULAR; INTRAVENOUS at 14:09

## 2018-03-16 RX ADMIN — OXYCODONE HYDROCHLORIDE 15 MG: 5 TABLET ORAL at 17:09

## 2018-03-16 RX ADMIN — PIPERACILLIN SODIUM AND TAZOBACTAM SODIUM 4.5 G: 4; .5 INJECTION, POWDER, FOR SOLUTION INTRAVENOUS at 21:46

## 2018-03-16 RX ADMIN — DOCUSATE SODIUM 100 MG: 100 CAPSULE ORAL at 20:24

## 2018-03-16 RX ADMIN — DIPHENHYDRAMINE HCL 50 MG: 25 TABLET ORAL at 05:29

## 2018-03-16 RX ADMIN — OXYCODONE HYDROCHLORIDE 15 MG: 5 TABLET ORAL at 02:54

## 2018-03-16 RX ADMIN — FENTANYL CITRATE 100 MCG: 50 INJECTION, SOLUTION INTRAMUSCULAR; INTRAVENOUS at 04:40

## 2018-03-16 RX ADMIN — ENOXAPARIN SODIUM 30 MG: 100 INJECTION SUBCUTANEOUS at 10:26

## 2018-03-16 ASSESSMENT — PAIN SCALES - GENERAL
PAINLEVEL_OUTOF10: 8
PAINLEVEL_OUTOF10: 8
PAINLEVEL_OUTOF10: 7
PAINLEVEL_OUTOF10: 8
PAINLEVEL_OUTOF10: 7
PAINLEVEL_OUTOF10: 8

## 2018-03-16 ASSESSMENT — ENCOUNTER SYMPTOMS
ABDOMINAL PAIN: 1
BLOOD IN STOOL: 0
CONSTIPATION: 0
NAUSEA: 1

## 2018-03-16 NOTE — PROGRESS NOTES
"Trauma / Surgical Progress Note  Interval Events:  Improving   Nursing found a bag of drugs , noted   Pain controlled   Not opiod naive   Getting clips out   Advance diet TKO IV   D/C PANCHO  Home tomorrow     Review of Systems   Gastrointestinal: Positive for abdominal pain and nausea. Negative for blood in stool and constipation.   All other systems reviewed and are negative.      Vitals:  Blood pressure 109/63, pulse 74, temperature 36.6 °C (97.8 °F), resp. rate 16, height 1.575 m (5' 2\"), weight 55.2 kg (121 lb 11.1 oz), last menstrual period 2011, SpO2 99 %, not currently breastfeeding.  Temp (24hrs), Av.8 °C (98.2 °F), Min:36.6 °C (97.8 °F), Max:37.1 °C (98.8 °F)      IO:    Date 18 0700 - 18 0659   Shift 2759-6954 5139-7882 0188-3936 24 Hour Total   I  N  T  A  K  E   Shift Total       O  U  T  P  U  T   Urine          Number of Times Voided 1 x   1 x    Drains 10   10      Luis Eduardo Jordan 1 10   10    Shift Total 10   10   NET -10   -10       Exam:  Respiratory: unlabored respirations, no intercostal retractions or accessory muscle use  Neuro: no focal deficits noted  Cardiovascular: regular rate and rhythm  GI: diffusely tender  Other: General: mild distress    Labs:  Recent Results (from the past 24 hour(s))   Basic Metabolic Panel (BMP): Tomorrow AM    Collection Time: 18  4:50 AM   Result Value Ref Range    Sodium 135 135 - 145 mmol/L    Potassium 3.8 3.6 - 5.5 mmol/L    Chloride 102 96 - 112 mmol/L    Co2 26 20 - 33 mmol/L    Glucose 127 (H) 65 - 99 mg/dL    Bun 7 (L) 8 - 22 mg/dL    Creatinine 0.72 0.50 - 1.40 mg/dL    Calcium 8.7 8.5 - 10.5 mg/dL    Anion Gap 7.0 0.0 - 11.9   ESTIMATED GFR    Collection Time: 18  4:50 AM   Result Value Ref Range    GFR If African American >60 >60 mL/min/1.73 m 2    GFR If Non African American >60 >60 mL/min/1.73 m 2       Problem and Plan:  There are no active hospital problems to display for this patient.      Core Measures & Quality " Metrics:  Core Measures & Quality Metrics

## 2018-03-16 NOTE — PROGRESS NOTES
Per Dr. Javed okay to removed every other staple. Dr. Javed will see pt in the morning. Pt informed.

## 2018-03-16 NOTE — PROGRESS NOTES
Bedside report received.  Assessment complete.  A&O x 4. Patient calls appropriately.  Patient up self, requires no assistance. Pt ambulates with a steady gait.   Patient has 8/10 pain. Medication provided.  Denies N&V. Tolerating clear liquid diet.  Surgical site noted to RUQ abdomen, covered with guaze.  positive void, positive flatus  Patient denies SOB.  Patient resting comfortably in bed.  Review plan with of care with patient. Call light and personal belongings with in reach. Hourly rounding in place. All needs met at this time.

## 2018-03-16 NOTE — PROGRESS NOTES
Assumed care of this patient @ 0700. Staples removed and drain removed. Patient is still requiring IV pain medication for breakthrough pain. Discharge planned for tomorrow.

## 2018-03-16 NOTE — PROGRESS NOTES
Bag of unidentified medications found sitting on top of pt's purse. Pt states medications are for her sister in law, who forgot to pick them up. Pt agrees to have medications tagged and sent to pharmacy.   This RN delivered medications to pharmacy, tag is in chart.

## 2018-03-16 NOTE — PROGRESS NOTES
"Pt requested bandages to be changed due to \"abnormal discomfort\" and \"itching.\" Bandages removed, 3x incisional sites with staples. Pt has allergy to \"all metals.\" Pt states she is \"allergic to staples\" and requests to have them removed.    Paged MD. Per answering service on call MD to call back. No return call. RN went to ED and called ICU to contact surgeon. Answering service called RN asking if return call was given. No return call has been received, answering service stated additional page would be placed.     Paged trauma APRN. Received orders from Steve trauma APRN to treat pt with benadryl for itching.   "

## 2018-03-16 NOTE — CARE PLAN
Problem: Safety  Goal: Will remain free from falls  Outcome: PROGRESSING AS EXPECTED  Treaded slipper socks on, personal belongings are within reach, call light is within reach, bed is locked and in the lowest position, and hourly rounding is in place.     Problem: Pain Management  Goal: Pain level will decrease to patient's comfort goal    Intervention: Follow pain managment plan developed in collaboration with patient and Interdisciplinary Team  PRN medications in use to help pt manage pain.

## 2018-03-17 LAB
ANION GAP SERPL CALC-SCNC: 8 MMOL/L (ref 0–11.9)
BASOPHILS # BLD AUTO: 0.5 % (ref 0–1.8)
BASOPHILS # BLD: 0.04 K/UL (ref 0–0.12)
BUN SERPL-MCNC: 4 MG/DL (ref 8–22)
CALCIUM SERPL-MCNC: 8.5 MG/DL (ref 8.5–10.5)
CHLORIDE SERPL-SCNC: 105 MMOL/L (ref 96–112)
CO2 SERPL-SCNC: 25 MMOL/L (ref 20–33)
CREAT SERPL-MCNC: 0.65 MG/DL (ref 0.5–1.4)
EOSINOPHIL # BLD AUTO: 0.58 K/UL (ref 0–0.51)
EOSINOPHIL NFR BLD: 7.8 % (ref 0–6.9)
ERYTHROCYTE [DISTWIDTH] IN BLOOD BY AUTOMATED COUNT: 42.5 FL (ref 35.9–50)
GLUCOSE SERPL-MCNC: 105 MG/DL (ref 65–99)
HCT VFR BLD AUTO: 27.7 % (ref 37–47)
HGB BLD-MCNC: 9.3 G/DL (ref 12–16)
IMM GRANULOCYTES # BLD AUTO: 0.03 K/UL (ref 0–0.11)
IMM GRANULOCYTES NFR BLD AUTO: 0.4 % (ref 0–0.9)
LYMPHOCYTES # BLD AUTO: 1.42 K/UL (ref 1–4.8)
LYMPHOCYTES NFR BLD: 19.2 % (ref 22–41)
MCH RBC QN AUTO: 32.4 PG (ref 27–33)
MCHC RBC AUTO-ENTMCNC: 33.6 G/DL (ref 33.6–35)
MCV RBC AUTO: 96.5 FL (ref 81.4–97.8)
MONOCYTES # BLD AUTO: 0.47 K/UL (ref 0–0.85)
MONOCYTES NFR BLD AUTO: 6.4 % (ref 0–13.4)
NEUTROPHILS # BLD AUTO: 4.85 K/UL (ref 2–7.15)
NEUTROPHILS NFR BLD: 65.7 % (ref 44–72)
NRBC # BLD AUTO: 0 K/UL
NRBC BLD-RTO: 0 /100 WBC
PLATELET # BLD AUTO: 174 K/UL (ref 164–446)
PMV BLD AUTO: 10.4 FL (ref 9–12.9)
POTASSIUM SERPL-SCNC: 3.5 MMOL/L (ref 3.6–5.5)
RBC # BLD AUTO: 2.87 M/UL (ref 4.2–5.4)
SODIUM SERPL-SCNC: 138 MMOL/L (ref 135–145)
WBC # BLD AUTO: 7.4 K/UL (ref 4.8–10.8)

## 2018-03-17 PROCEDURE — A9270 NON-COVERED ITEM OR SERVICE: HCPCS | Performed by: NURSE PRACTITIONER

## 2018-03-17 PROCEDURE — 700105 HCHG RX REV CODE 258: Performed by: SURGERY

## 2018-03-17 PROCEDURE — 85025 COMPLETE CBC W/AUTO DIFF WBC: CPT

## 2018-03-17 PROCEDURE — 700102 HCHG RX REV CODE 250 W/ 637 OVERRIDE(OP): Performed by: NURSE PRACTITIONER

## 2018-03-17 PROCEDURE — 36415 COLL VENOUS BLD VENIPUNCTURE: CPT

## 2018-03-17 PROCEDURE — 80048 BASIC METABOLIC PNL TOTAL CA: CPT

## 2018-03-17 PROCEDURE — 700102 HCHG RX REV CODE 250 W/ 637 OVERRIDE(OP): Performed by: SURGERY

## 2018-03-17 PROCEDURE — A9270 NON-COVERED ITEM OR SERVICE: HCPCS | Performed by: SURGERY

## 2018-03-17 PROCEDURE — 700112 HCHG RX REV CODE 229: Performed by: SURGERY

## 2018-03-17 PROCEDURE — 700111 HCHG RX REV CODE 636 W/ 250 OVERRIDE (IP): Performed by: SURGERY

## 2018-03-17 PROCEDURE — 770001 HCHG ROOM/CARE - MED/SURG/GYN PRIV*

## 2018-03-17 RX ADMIN — ACETAMINOPHEN 1000 MG: 500 TABLET ORAL at 06:12

## 2018-03-17 RX ADMIN — OXYCODONE HYDROCHLORIDE 15 MG: 5 TABLET ORAL at 06:13

## 2018-03-17 RX ADMIN — LEVOTHYROXINE SODIUM 100 MCG: 100 TABLET ORAL at 06:13

## 2018-03-17 RX ADMIN — OXYCODONE HYDROCHLORIDE 15 MG: 5 TABLET ORAL at 15:39

## 2018-03-17 RX ADMIN — OXYCODONE HYDROCHLORIDE 15 MG: 5 TABLET ORAL at 21:52

## 2018-03-17 RX ADMIN — ACETAMINOPHEN 1000 MG: 500 TABLET ORAL at 18:44

## 2018-03-17 RX ADMIN — PIPERACILLIN SODIUM AND TAZOBACTAM SODIUM 4.5 G: 4; .5 INJECTION, POWDER, FOR SOLUTION INTRAVENOUS at 05:30

## 2018-03-17 RX ADMIN — DOCUSATE SODIUM 100 MG: 100 CAPSULE ORAL at 09:08

## 2018-03-17 RX ADMIN — ENOXAPARIN SODIUM 30 MG: 100 INJECTION SUBCUTANEOUS at 09:08

## 2018-03-17 RX ADMIN — OXYCODONE HYDROCHLORIDE 15 MG: 5 TABLET ORAL at 12:18

## 2018-03-17 RX ADMIN — ACETAMINOPHEN 1000 MG: 500 TABLET ORAL at 12:18

## 2018-03-17 RX ADMIN — OXYCODONE HYDROCHLORIDE 15 MG: 5 TABLET ORAL at 18:44

## 2018-03-17 RX ADMIN — DIPHENHYDRAMINE HCL 50 MG: 25 TABLET ORAL at 13:40

## 2018-03-17 RX ADMIN — PIPERACILLIN SODIUM AND TAZOBACTAM SODIUM 4.5 G: 4; .5 INJECTION, POWDER, FOR SOLUTION INTRAVENOUS at 13:43

## 2018-03-17 RX ADMIN — DOCUSATE SODIUM 100 MG: 100 CAPSULE ORAL at 21:33

## 2018-03-17 RX ADMIN — OXYCODONE HYDROCHLORIDE 15 MG: 5 TABLET ORAL at 02:44

## 2018-03-17 RX ADMIN — DIPHENHYDRAMINE HCL 50 MG: 25 TABLET ORAL at 21:52

## 2018-03-17 RX ADMIN — OXYCODONE HYDROCHLORIDE 15 MG: 5 TABLET ORAL at 09:08

## 2018-03-17 ASSESSMENT — PAIN SCALES - GENERAL
PAINLEVEL_OUTOF10: 8
PAINLEVEL_OUTOF10: 7
PAINLEVEL_OUTOF10: 8
PAINLEVEL_OUTOF10: 7
PAINLEVEL_OUTOF10: 8
PAINLEVEL_OUTOF10: 8
PAINLEVEL_OUTOF10: 5
PAINLEVEL_OUTOF10: 8

## 2018-03-17 NOTE — PROGRESS NOTES
"Surgery    BM yesterday    /59   Pulse 67   Temp 36.5 °C (97.7 °F)   Resp 16   Ht 1.575 m (5' 2\")   Wt 55.2 kg (121 lb 11.1 oz)   LMP 12/01/2011 (Approximate)   SpO2 100%   Breastfeeding? No   BMI 22.26 kg/m²     NAD  CTAB  ABd soft, NT ND  Inc CDI    A/P  Pt refusing DC - does not want to risk returning with ileus  OK for DC 3/17 AM  CBC ordered for this AM, if OK then DC abx    "

## 2018-03-17 NOTE — PROGRESS NOTES
Bedside report received.  Assessment complete.  A&O x 4. Patient calls appropriately.  Patient up self, no assistance required.     Patient has 8/10 pain. Medication provided  Denies N&V. Tolerating clear liquid diet.  Surgical sites noted to abdomen, dressing CDI.  positive void, positive flatus  Patient denies SOB.  Patient resting comfortably in bed.  Review plan with of care with patient. Call light and personal belongings with in reach. Hourly rounding in place. All needs met at this time.

## 2018-03-17 NOTE — PROGRESS NOTES
Bedside report received.  Assessment complete.  A&O x 4. Patient calls appropriately.  Patient up with no assist. Bed alarm refused. Pt educated.    Patient has 8/10 pain. Pain to abd.  Denies N&V. Tolerating GI soft diet.  Surgical lap sites with dry gauze and tegaderm.  + void, + flatus  Patient denies SOB.  SCD's refused. Pt educated.  Patient anticipated to DC today.  Review plan with of care with patient. Call light and personal belongings with in reach. Hourly rounding in place. All needs met at this time.

## 2018-03-18 VITALS
TEMPERATURE: 98.3 F | BODY MASS INDEX: 22.39 KG/M2 | SYSTOLIC BLOOD PRESSURE: 111 MMHG | HEIGHT: 62 IN | OXYGEN SATURATION: 99 % | RESPIRATION RATE: 17 BRPM | WEIGHT: 121.69 LBS | HEART RATE: 69 BPM | DIASTOLIC BLOOD PRESSURE: 77 MMHG

## 2018-03-18 PROBLEM — K40.91 UNILATERAL RECURRENT INGUINAL HERNIA: Status: RESOLVED | Noted: 2017-09-27 | Resolved: 2018-03-18

## 2018-03-18 LAB
ANION GAP SERPL CALC-SCNC: 9 MMOL/L (ref 0–11.9)
BUN SERPL-MCNC: <3 MG/DL (ref 8–22)
CALCIUM SERPL-MCNC: 9.3 MG/DL (ref 8.5–10.5)
CHLORIDE SERPL-SCNC: 106 MMOL/L (ref 96–112)
CO2 SERPL-SCNC: 26 MMOL/L (ref 20–33)
CREAT SERPL-MCNC: 0.71 MG/DL (ref 0.5–1.4)
GLUCOSE SERPL-MCNC: 110 MG/DL (ref 65–99)
POTASSIUM SERPL-SCNC: 3.9 MMOL/L (ref 3.6–5.5)
SODIUM SERPL-SCNC: 141 MMOL/L (ref 135–145)

## 2018-03-18 PROCEDURE — A9270 NON-COVERED ITEM OR SERVICE: HCPCS | Performed by: NURSE PRACTITIONER

## 2018-03-18 PROCEDURE — 700111 HCHG RX REV CODE 636 W/ 250 OVERRIDE (IP): Performed by: SURGERY

## 2018-03-18 PROCEDURE — 700102 HCHG RX REV CODE 250 W/ 637 OVERRIDE(OP): Performed by: SURGERY

## 2018-03-18 PROCEDURE — 700102 HCHG RX REV CODE 250 W/ 637 OVERRIDE(OP): Performed by: NURSE PRACTITIONER

## 2018-03-18 PROCEDURE — 80048 BASIC METABOLIC PNL TOTAL CA: CPT

## 2018-03-18 PROCEDURE — 700105 HCHG RX REV CODE 258: Performed by: SURGERY

## 2018-03-18 PROCEDURE — A9270 NON-COVERED ITEM OR SERVICE: HCPCS | Performed by: SURGERY

## 2018-03-18 PROCEDURE — 36415 COLL VENOUS BLD VENIPUNCTURE: CPT

## 2018-03-18 PROCEDURE — 700112 HCHG RX REV CODE 229: Performed by: SURGERY

## 2018-03-18 RX ORDER — OXYCODONE HYDROCHLORIDE 15 MG/1
15 TABLET ORAL
Qty: 56 TAB | Refills: 0 | Status: SHIPPED | OUTPATIENT
Start: 2018-03-18 | End: 2018-03-25

## 2018-03-18 RX ADMIN — ACETAMINOPHEN 1000 MG: 500 TABLET ORAL at 00:47

## 2018-03-18 RX ADMIN — OXYCODONE HYDROCHLORIDE 15 MG: 5 TABLET ORAL at 05:22

## 2018-03-18 RX ADMIN — OXYCODONE HYDROCHLORIDE 15 MG: 5 TABLET ORAL at 08:53

## 2018-03-18 RX ADMIN — ENOXAPARIN SODIUM 30 MG: 100 INJECTION SUBCUTANEOUS at 07:58

## 2018-03-18 RX ADMIN — DOCUSATE SODIUM 100 MG: 100 CAPSULE ORAL at 07:58

## 2018-03-18 RX ADMIN — ACETAMINOPHEN 1000 MG: 500 TABLET ORAL at 05:22

## 2018-03-18 RX ADMIN — LEVOTHYROXINE SODIUM 100 MCG: 100 TABLET ORAL at 05:22

## 2018-03-18 RX ADMIN — PIPERACILLIN SODIUM AND TAZOBACTAM SODIUM 4.5 G: 4; .5 INJECTION, POWDER, FOR SOLUTION INTRAVENOUS at 05:28

## 2018-03-18 RX ADMIN — OXYCODONE HYDROCHLORIDE 15 MG: 5 TABLET ORAL at 00:52

## 2018-03-18 ASSESSMENT — PAIN SCALES - GENERAL
PAINLEVEL_OUTOF10: 7
PAINLEVEL_OUTOF10: 6
PAINLEVEL_OUTOF10: 7
PAINLEVEL_OUTOF10: 8

## 2018-03-18 NOTE — PROGRESS NOTES
"Assumed care of patient from night shift RN.  Patient is alert and oriented times 4, states pain of 7/10, will medicate per MAR.  VSS /66   Pulse 68   Temp 37.3 °C (99.2 °F)   Resp 16   Ht 1.575 m (5' 2\")   Wt 55.2 kg (121 lb 11.1 oz)   LMP 12/01/2011 (Approximate)   SpO2 99%   Breastfeeding? No   BMI 22.26 kg/m²   PIV in the LAC, patent and currently running Zosyn.  4 lap incisions to the abdomen, dressings CDI.  On RA with saturations in the mid 90s.  Up self, demonstrates steady gait, no assistance needed.  Low fiber, soft GI diet, tolerating well.  Denies nausea.  Urinating without difficulty, last BM this AM, bowel protocol in place.  POC discussed for the day, bed is locked and in the lowest position, call light is within reach.  All needs are met at this time, hourly rounding is in place.  "

## 2018-03-18 NOTE — PROGRESS NOTES
Bedside report received.  Assessment complete.  A&O x 4. Patient calls appropriately.  Patient up self. Steady gait.  Patient has 7/10 pain. MAR medication given.  Denies N&V. Tolerating diet.  + void, + flatus  Lap sites to abdomen.   Patient denies SOB.  Review plan with of care with patient. Call light and personal belongings with in reach. Hourly rounding in place. All needs met at this time.

## 2018-03-18 NOTE — DISCHARGE SUMMARY
DATE OF ADMISSION:  03/14/2018    DATE OF DISCHARGE:  03/18/2018     ADMISSION DIAGNOSIS:  Chronic colonic inertia with chronic constipation   failing medical therapy.    DISCHARGE DIAGNOSIS:  Chronic colonic inertia with chronic constipation   failing medical therapy.    ADMITTING SURGEON:  Tigre Javed MD    BRIEF HISTORY:  This is a 37-year-old female with a longstanding history of   narcotic use secondary to chronic pain, she has had many abdominal operations   including a recent colectomy for volvulus related to inertia and chronic   constipation, and she had had ongoing issues with colonic obstruction   secondary to constipation since that time.  The patient was admitted on the   14th of March for subtotal colectomy that was done laparoscopically by Dr. Javed.    HOSPITAL COURSE:  On 03/14/2018, patient was admitted to [facility] where she had her subtotal colectomy performed.  She was   admitted to the surgical floor for postoperative recovery.  During the   postoperative period, she had the expected issues with pain control, which was   appropriately addressed initially with IV narcotics and then transitioned to   oral oxycodone 15 mg, which she was taking every 3 hours.  The patient was   advanced on a diet, and by postoperative day #3, she was taking adequate oral   intake.  She had had a bowel movement by the morning of the 18th and was at   that time deemed stable for discharge.    DISCHARGE INSTRUCTIONS.  1.  She was prescribed oxycodone 15 mg to be taken every 3 hours as needed for   pain.  A 1-week supply of 56 tablets is provided.  2.  She can take over-the-counter Motrin and Tylenol for supplemental pain   control.  She is advised to take Metamucil for excessive bowel movements,   which she should report to the office.  2.  She should resume her home medications with the exception of Lasix due to   her hypokalemia that she has experienced prehospital.  3.  She should have  a regular diet as tolerated.  4.  She can have light activity without lifting greater than 10-15 pounds and   without excessive bending or stooping for up to 2 weeks, and she will be   cleared for regular activity by the surgeon when that time was appropriate.    Finally, she should follow up in the office in 1 week's time for a wound check   postoperative check and pain prescriptions.  If she has any questions or   concerns such as fevers, chills, constipation, excessive diarrhea, abdominal   pain or oral intolerance and then she should call the office or advised to   present to the emergency department.       ____________________________________     DO SUSAN ALEGRIA / DEWAYNE    DD:  03/18/2018 10:35:41  DT:  03/18/2018 10:59:21    D#:  [id number]  Job#:  [id number]

## 2018-03-18 NOTE — DISCHARGE INSTRUCTIONS
Discharge Instructions    Discharged to home by car with relative. Discharged via wheelchair, hospital escort: Yes.  Special equipment needed: Not Applicable    Be sure to schedule a follow-up appointment with your primary care doctor or any specialists as instructed.     Discharge Plan:   Influenza Vaccine Indication: Patient Refuses    I understand that a diet low in cholesterol, fat, and sodium is recommended for good health. Unless I have been given specific instructions below for another diet, I accept this instruction as my diet prescription.   Other diet: gi soft low fiber      Special Instructions: None    · Is patient discharged on Warfarin / Coumadin?   No     Depression / Suicide Risk    As you are discharged from this [facility] facility, it is important to learn how to keep safe from harming yourself.    Recognize the warning signs:  · Abrupt changes in personality, positive or negative- including increase in energy   · Giving away possessions  · Change in eating patterns- significant weight changes-  positive or negative  · Change in sleeping patterns- unable to sleep or sleeping all the time   · Unwillingness or inability to communicate  · Depression  · Unusual sadness, discouragement and loneliness  · Talk of wanting to die  · Neglect of personal appearance   · Rebelliousness- reckless behavior  · Withdrawal from people/activities they love  · Confusion- inability to concentrate     If you or a loved one observes any of these behaviors or has concerns about self-harm, here's what you can do:  · Talk about it- your feelings and reasons for harming yourself  · Remove any means that you might use to hurt yourself (examples: pills, rope, extension cords, firearm)  · Get professional help from the community (Mental Health, Substance Abuse, psychological counseling)  · Do not be alone:Call your Safe Contact- someone whom you trust who will be there for you.  · Call your local CRISIS HOTLINE 117-5253 or  416.224.5876  · Call your local Children's Mobile Crisis Response Team [place] (237) 178-2802 or www.[employer]  · Call the toll free National Suicide Prevention Hotlines   · National Suicide Prevention Lifeline 920-144-AHAZ (3625)  · National Hope Line Network 800-SUICIDE (275-9913)    Laparoscopic Colectomy, Care After  Refer to this sheet in the next few weeks. These instructions provide you with information on caring for yourself after your procedure. Your health care provider may also give you more specific instructions. Your treatment has been planned according to current medical practices, but problems sometimes occur. Call your health care provider if you have any problems or questions after your procedure.  WHAT TO EXPECT AFTER THE PROCEDURE  After your procedure, it is typical to have the following:  · Pain in your abdomen, especially at the incision sites. You will be given pain medicine to control the pain.  · Tiredness. This is a normal part of the recovery process. Your energy level will return to normal over the next several weeks.  · Constipation. You may be given stool softeners to prevent this.  HOME CARE INSTRUCTIONS   · Only take over-the-counter or prescription medicines as directed by your health care provider.  · Ask your health care provider whether you may take a shower when you go home.  · Remove or change any bandages (dressings) as directed.  · You may resume a normal diet and activities as directed. Eat plenty of fruits and vegetables to help prevent constipation.  · Drink enough fluids to keep your urine clear or pale yellow. This also helps prevent constipation.  · Take rest breaks during the day as needed.  · Avoid lifting anything heavier than 25 pounds (11.3 kg) or driving for 4 weeks or until your health care provider says it is okay.  · Follow up with your health care provider as directed. Ask your health care provider when to make an appointment to get your stitches  or staples removed.  SEEK MEDICAL CARE IF:   · You have increased bleeding from the incision areas.  · You have redness, swelling, or increasing pain in the wounds.  · You see pus coming from a wound.  · You have a fever.  · You notice a foul smell coming from the wound or dressing.  · Your wound is breaking open (edges not staying together) after sutures or staples have been removed.  SEEK IMMEDIATE MEDICAL CARE IF:  · You develop a rash.  · You have chest pain or difficulty breathing.  · You have pain or swelling in your legs.  · You have lightheadedness or feel faint.  · Your abdomen becomes larger (distended).  · You have nausea or vomiting.  · You have blood in your stools.     This information is not intended to replace advice given to you by your health care provider. Make sure you discuss any questions you have with your health care provider.     Document Released: 07/07/2006 Document Revised: 10/08/2014 Document Reviewed: 07/30/2014  [employer] Interactive Patient Education ©2016 [employer]      Low-Fiber Diet  Fiber is found in fruits, vegetables, and whole grains. A low-fiber diet restricts fibrous foods that are not digested in the small intestine. A diet containing about 10-15 grams of fiber per day is considered low fiber. Low-fiber diets may be used to:  · Promote healing and rest the bowel during intestinal flare-ups.  · Prevent blockage of a partially obstructed or narrowed gastrointestinal tract.  · Reduce fecal weight and volume.  · Slow the movement of feces.  You may be on a low-fiber diet as a transitional diet following surgery, after an injury (trauma), or because of a short (acute) or lifelong (chronic) illness. Your health care provider will determine the length of time you need to stay on this diet.  What do I need to know about a low-fiber diet?  Always check the fiber content on the packaging's Nutrition Facts label, especially on foods from the grains list. Ask your dietitian if you  have questions about specific foods that are related to your condition, especially if the food is not listed below. In general, a low-fiber food will have less than 2 g of fiber.  What foods can I eat?  Grains   All breads and crackers made with white flour. Sweet rolls, doughnuts, waffles, pancakes, Polish toast, bagels. Pretzels, Kell toast, zwieback. Well-cooked cereals, such as cornmeal, farina, or cream cereals. Dry cereals that do not contain whole grains, fruit, or nuts, such as refined corn, wheat, rice, and oat cereals. Potatoes prepared any way without skins, plain pastas and noodles, refined white rice. Use white flour for baking and making sauces. Use allowed list of grains for casseroles, dumplings, and puddings.  Vegetables   Strained tomato and vegetable juices. Fresh lettuce, cucumber, spinach. Well-cooked (no skin or pulp) or canned vegetables, such as asparagus, bean sprouts, beets, carrots, green beans, mushrooms, potatoes, pumpkin, spinach, yellow squash, tomato sauce/puree, turnips, yams, and zucchini. Keep servings limited to ½ cup.  Fruits   All fruit juices except prune juice. Cooked or canned fruits without skin and seeds, such as applesauce, apricots, cherries, fruit cocktail, grapefruit, grapes, mandarin oranges, melons, peaches, pears, pineapple, and plums. Fresh fruits without skin, such as apricots, avocados, bananas, melons, pineapple, nectarines, and peaches. Keep servings limited to ½ cup or 1 piece.  Meat and Other Protein Sources   Ground or well-cooked tender beef, ham, veal, lamb, pork, or poultry. Eggs, plain cheese. Fish, oysters, shrimp, lobster, and other seafood. Liver, organ meats. Smooth nut butters.  Dairy   All milk products and alternative dairy substitutes, such as soy, rice, almond, and coconut, not containing added whole nuts, seeds, or added fruit.  Beverages   Decaf coffee, fruit, and vegetable juices or smoothies (small amounts, with no pulp or skins, and with  fruits from allowed list), sports drinks, herbal tea.  Condiments   Ketchup, mustard, vinegar, cream sauce, cheese sauce, cocoa powder. Spices in moderation, such as allspice, basil, bay leaves, celery powder or leaves, cinnamon, cumin powder, moody powder, arnulfo, mace, marjoram, onion or garlic powder, oregano, paprika, parsley flakes, ground pepper, rosemary, terry, savory, tarragon, thyme, and turmeric.  Sweets and Desserts   Plain cakes and cookies, pie made with allowed fruit, pudding, custard, cream pie. Gelatin, fruit, ice, sherbet, frozen ice pops. Ice cream, ice milk without nuts. Plain hard candy, honey, jelly, molasses, syrup, sugar, chocolate syrup, gumdrops, marshmallows. Limit overall sugar intake.  Fats and Oil   Margarine, butter, cream, mayonnaise, salad oils, plain salad dressings made from allowed foods. Choose healthy fats such as olive oil, canola oil, and omega-3 fatty acids (such as found in salmon or tuna) when possible.  Other   Bouillon, broth, or cream soups made from allowed foods. Any strained soup. Casseroles or mixed dishes made with allowed foods.  The items listed above may not be a complete list of recommended foods or beverages. Contact your dietitian for more options.   What foods are not recommended?  Grains   All whole wheat and whole grain breads and crackers. Multigrains, rye, bran seeds, nuts, or coconut. Cereals containing whole grains, multigrains, bran, coconut, nuts, raisins. Cooked or dry oatmeal, steel-cut oats. Coarse wheat cereals, granola. Cereals advertised as high fiber. Potato skins. Whole grain pasta, wild or brown rice. Popcorn. Coconut flour. Bran, buckwheat, corn bread, multigrains, rye, wheat germ.  Vegetables   Fresh, cooked or canned vegetables, such as artichokes, asparagus, beet greens, broccoli, [place] sprouts, cabbage, celery, cauliflower, corn, eggplant, kale, legumes or beans, okra, peas, and tomatoes. Avoid large servings of any vegetables,  especially raw vegetables.  Fruits   Fresh fruits, such as apples with or without skin, berries, cherries, figs, grapes, grapefruit, guavas, kiwis, mangoes, oranges, papayas, pears, persimmons, pineapple, and pomegranate. Prune juice and juices with pulp, stewed or dried prunes. Dried fruits, dates, raisins. Fruit seeds or skins. Avoid large servings of all fresh fruits.  Meats and Other Protein Sources   Tough, fibrous meats with gristle. [place] nut butter. Cheese made with seeds, nuts, or other foods not recommended. Nuts, seeds, legumes (beans, including baked beans), dried peas, beans, lentils.  Dairy   Yogurt or cheese that contains nuts, seeds, or added fruit.  Beverages   Fruit juices with high pulp, prune juice. Caffeinated coffee and teas.  Condiments   Coconut, maple syrup, pickles, olives.  Sweets and Desserts   Desserts, cookies, or candies that contain nuts or coconut, chunky peanut butter, dried fruits. Jams, preserves with seeds, marmalade. Large amounts of sugar and sweets. Any other dessert made with fruits from the not recommended list.  Other   Soups made from vegetables that are not recommended or that contain other foods not recommended.  The items listed above may not be a complete list of foods and beverages to avoid. Contact your dietitian for more information.   This information is not intended to replace advice given to you by your health care provider. Make sure you discuss any questions you have with your health care provider.  Document Released: 06/09/2003 Document Revised: 05/25/2017 Document Reviewed: 11/10/2014  [employer] Interactive Patient Education © 2017 [employer]    Laparoscopic Colectomy, Care After  Refer to this sheet in the next few weeks. These instructions provide you with information on caring for yourself after your procedure. Your health care provider may also give you more specific instructions. Your treatment has been planned according to current medical practices,  but problems sometimes occur. Call your health care provider if you have any problems or questions after your procedure.  WHAT TO EXPECT AFTER THE PROCEDURE  After your procedure, it is typical to have the following:  · Pain in your abdomen, especially at the incision sites. You will be given pain medicine to control the pain.  · Tiredness. This is a normal part of the recovery process. Your energy level will return to normal over the next several weeks.  · Constipation. You may be given stool softeners to prevent this.  HOME CARE INSTRUCTIONS   · Only take over-the-counter or prescription medicines as directed by your health care provider.  · Ask your health care provider whether you may take a shower when you go home.  · Remove or change any bandages (dressings) as directed.  · You may resume a normal diet and activities as directed. Eat plenty of fruits and vegetables to help prevent constipation.  · Drink enough fluids to keep your urine clear or pale yellow. This also helps prevent constipation.  · Take rest breaks during the day as needed.  · Avoid lifting anything heavier than 25 pounds (11.3 kg) or driving for 4 weeks or until your health care provider says it is okay.  · Follow up with your health care provider as directed. Ask your health care provider when to make an appointment to get your stitches or staples removed.  SEEK MEDICAL CARE IF:   · You have increased bleeding from the incision areas.  · You have redness, swelling, or increasing pain in the wounds.  · You see pus coming from a wound.  · You have a fever.  · You notice a foul smell coming from the wound or dressing.  · Your wound is breaking open (edges not staying together) after sutures or staples have been removed.  SEEK IMMEDIATE MEDICAL CARE IF:  · You develop a rash.  · You have chest pain or difficulty breathing.  · You have pain or swelling in your legs.  · You have lightheadedness or feel faint.  · Your abdomen becomes larger  (distended).  · You have nausea or vomiting.  · You have blood in your stools.     This information is not intended to replace advice given to you by your health care provider. Make sure you discuss any questions you have with your health care provider.     Document Released: 07/07/2006 Document Revised: 10/08/2014 Document Reviewed: 07/30/2014  [employer] Interactive Patient Education ©2016 [employer]

## 2018-03-18 NOTE — PROGRESS NOTES
Discharging Patient home per physician order.  Discharged with family.  Demonstrated understanding of discharge instructions, follow up appointments, home medications, prescriptions, home care for surgical wound, and nursing care instructions for partial colectomy.  Ambulating without assistance, voiding without difficulty, pain well controlled, tolerating oral medications, oxygen saturation greater than 90% , tolerating diet.   Educational handouts given and discussed.  Verbalized understanding of discharge instructions and educational handouts.  All questions answered.  Belongings with patient at time of discharge.

## 2018-03-18 NOTE — CARE PLAN
Problem: Bowel/Gastric:  Goal: Normal bowel function is maintained or improved  Outcome: PROGRESSING AS EXPECTED  +BM this AM, bowel protocol in place    Problem: Pain Management  Goal: Pain level will decrease to patient's comfort goal  Outcome: PROGRESSING AS EXPECTED  Medicated per MAR for pain of 7/10

## 2018-03-18 NOTE — PROGRESS NOTES
"Surgery    Pain controlled w oxy 15  BM  Ann PO    /77   Pulse 69   Temp 36.8 °C (98.3 °F)   Resp 17   Ht 1.575 m (5' 2\")   Wt 55.2 kg (121 lb 11.1 oz)   LMP 12/01/2011 (Approximate)   SpO2 99%   Breastfeeding? No   BMI 22.26 kg/m²     NAD  CTAB  RRR  ABd soft, NT ND  Inc CDI    A/P  OK for DC home   See summary  "

## 2018-04-12 ENCOUNTER — HOSPITAL ENCOUNTER (OUTPATIENT)
Dept: LAB | Facility: MEDICAL CENTER | Age: 38
End: 2018-04-12
Attending: PHYSICIAN ASSISTANT
Payer: COMMERCIAL

## 2018-04-12 LAB
25(OH)D3 SERPL-MCNC: 24 NG/ML (ref 30–100)
ALBUMIN SERPL BCP-MCNC: 4.5 G/DL (ref 3.2–4.9)
ALBUMIN/GLOB SERPL: 1.5 G/DL
ALP SERPL-CCNC: 66 U/L (ref 30–99)
ALT SERPL-CCNC: 33 U/L (ref 2–50)
ANION GAP SERPL CALC-SCNC: 9 MMOL/L (ref 0–11.9)
APPEARANCE UR: CLEAR
AST SERPL-CCNC: 30 U/L (ref 12–45)
BASOPHILS # BLD AUTO: 1.3 % (ref 0–1.8)
BASOPHILS # BLD: 0.12 K/UL (ref 0–0.12)
BILIRUB SERPL-MCNC: 0.3 MG/DL (ref 0.1–1.5)
BILIRUB UR QL STRIP.AUTO: NEGATIVE
BUN SERPL-MCNC: 12 MG/DL (ref 8–22)
CALCIUM SERPL-MCNC: 9.6 MG/DL (ref 8.5–10.5)
CHLORIDE SERPL-SCNC: 103 MMOL/L (ref 96–112)
CO2 SERPL-SCNC: 26 MMOL/L (ref 20–33)
COLOR UR: YELLOW
CREAT SERPL-MCNC: 0.79 MG/DL (ref 0.5–1.4)
CULTURE IF INDICATED INDCX: NO UA CULTURE
EOSINOPHIL # BLD AUTO: 0.47 K/UL (ref 0–0.51)
EOSINOPHIL NFR BLD: 5.3 % (ref 0–6.9)
ERYTHROCYTE [DISTWIDTH] IN BLOOD BY AUTOMATED COUNT: 43.4 FL (ref 35.9–50)
GLOBULIN SER CALC-MCNC: 3 G/DL (ref 1.9–3.5)
GLUCOSE SERPL-MCNC: 84 MG/DL (ref 65–99)
GLUCOSE UR STRIP.AUTO-MCNC: NEGATIVE MG/DL
HCT VFR BLD AUTO: 41.9 % (ref 37–47)
HGB BLD-MCNC: 13.5 G/DL (ref 12–16)
IMM GRANULOCYTES # BLD AUTO: 0.02 K/UL (ref 0–0.11)
IMM GRANULOCYTES NFR BLD AUTO: 0.2 % (ref 0–0.9)
KETONES UR STRIP.AUTO-MCNC: NEGATIVE MG/DL
LEUKOCYTE ESTERASE UR QL STRIP.AUTO: NEGATIVE
LYMPHOCYTES # BLD AUTO: 2.27 K/UL (ref 1–4.8)
LYMPHOCYTES NFR BLD: 25.5 % (ref 22–41)
MCH RBC QN AUTO: 31.3 PG (ref 27–33)
MCHC RBC AUTO-ENTMCNC: 32.2 G/DL (ref 33.6–35)
MCV RBC AUTO: 97 FL (ref 81.4–97.8)
MICRO URNS: NORMAL
MONOCYTES # BLD AUTO: 0.67 K/UL (ref 0–0.85)
MONOCYTES NFR BLD AUTO: 7.5 % (ref 0–13.4)
NEUTROPHILS # BLD AUTO: 5.34 K/UL (ref 2–7.15)
NEUTROPHILS NFR BLD: 60.2 % (ref 44–72)
NITRITE UR QL STRIP.AUTO: NEGATIVE
NRBC # BLD AUTO: 0 K/UL
NRBC BLD-RTO: 0 /100 WBC
PH UR STRIP.AUTO: 6 [PH]
PLATELET # BLD AUTO: 323 K/UL (ref 164–446)
PMV BLD AUTO: 9.9 FL (ref 9–12.9)
POTASSIUM SERPL-SCNC: 4 MMOL/L (ref 3.6–5.5)
PROT SERPL-MCNC: 7.5 G/DL (ref 6–8.2)
PROT UR QL STRIP: NEGATIVE MG/DL
RBC # BLD AUTO: 4.32 M/UL (ref 4.2–5.4)
RBC UR QL AUTO: NEGATIVE
SODIUM SERPL-SCNC: 138 MMOL/L (ref 135–145)
SP GR UR STRIP.AUTO: 1.01
T3FREE SERPL-MCNC: 3.23 PG/ML (ref 2.4–4.2)
T4 FREE SERPL-MCNC: 0.88 NG/DL (ref 0.53–1.43)
TSH SERPL DL<=0.005 MIU/L-ACNC: 0.58 UIU/ML (ref 0.38–5.33)
UROBILINOGEN UR STRIP.AUTO-MCNC: 0.2 MG/DL
WBC # BLD AUTO: 8.9 K/UL (ref 4.8–10.8)

## 2018-04-12 PROCEDURE — 85025 COMPLETE CBC W/AUTO DIFF WBC: CPT

## 2018-04-12 PROCEDURE — 84443 ASSAY THYROID STIM HORMONE: CPT

## 2018-04-12 PROCEDURE — 82306 VITAMIN D 25 HYDROXY: CPT

## 2018-04-12 PROCEDURE — 84481 FREE ASSAY (FT-3): CPT

## 2018-04-12 PROCEDURE — 86800 THYROGLOBULIN ANTIBODY: CPT

## 2018-04-12 PROCEDURE — 86235 NUCLEAR ANTIGEN ANTIBODY: CPT | Mod: 91

## 2018-04-12 PROCEDURE — 36415 COLL VENOUS BLD VENIPUNCTURE: CPT

## 2018-04-12 PROCEDURE — 86038 ANTINUCLEAR ANTIBODIES: CPT

## 2018-04-12 PROCEDURE — 81003 URINALYSIS AUTO W/O SCOPE: CPT

## 2018-04-12 PROCEDURE — 80053 COMPREHEN METABOLIC PANEL: CPT

## 2018-04-12 PROCEDURE — 84439 ASSAY OF FREE THYROXINE: CPT

## 2018-04-12 PROCEDURE — 86225 DNA ANTIBODY NATIVE: CPT

## 2018-04-14 LAB — THYROGLOB AB SERPL-ACNC: <0.9 IU/ML (ref 0–4)

## 2018-04-15 LAB — NUCLEAR IGG SER QL IA: NORMAL

## 2018-09-09 ENCOUNTER — APPOINTMENT (OUTPATIENT)
Dept: RADIOLOGY | Facility: MEDICAL CENTER | Age: 38
End: 2018-09-09
Attending: EMERGENCY MEDICINE
Payer: COMMERCIAL

## 2018-09-09 ENCOUNTER — HOSPITAL ENCOUNTER (OUTPATIENT)
Facility: MEDICAL CENTER | Age: 38
End: 2018-09-11
Attending: EMERGENCY MEDICINE | Admitting: INTERNAL MEDICINE
Payer: COMMERCIAL

## 2018-09-09 DIAGNOSIS — R10.13 EPIGASTRIC PAIN: ICD-10-CM

## 2018-09-09 DIAGNOSIS — J06.9 UPPER RESPIRATORY TRACT INFECTION, UNSPECIFIED TYPE: ICD-10-CM

## 2018-09-09 DIAGNOSIS — R40.4 TRANSIENT ALTERATION OF AWARENESS: ICD-10-CM

## 2018-09-09 PROBLEM — E87.1 HYPONATREMIA: Status: ACTIVE | Noted: 2018-09-09

## 2018-09-09 PROBLEM — R29.90 STROKE-LIKE SYMPTOMS: Status: ACTIVE | Noted: 2018-09-09

## 2018-09-09 LAB
ALBUMIN SERPL BCP-MCNC: 4.9 G/DL (ref 3.2–4.9)
ALBUMIN/GLOB SERPL: 1.5 G/DL
ALP SERPL-CCNC: 61 U/L (ref 30–99)
ALT SERPL-CCNC: 52 U/L (ref 2–50)
ANION GAP SERPL CALC-SCNC: 9 MMOL/L (ref 0–11.9)
APPEARANCE UR: CLEAR
AST SERPL-CCNC: 32 U/L (ref 12–45)
BASOPHILS # BLD AUTO: 0.6 % (ref 0–1.8)
BASOPHILS # BLD: 0.07 K/UL (ref 0–0.12)
BILIRUB SERPL-MCNC: 0.3 MG/DL (ref 0.1–1.5)
BILIRUB UR QL STRIP.AUTO: NEGATIVE
BUN SERPL-MCNC: 19 MG/DL (ref 8–22)
CALCIUM SERPL-MCNC: 9.6 MG/DL (ref 8.4–10.2)
CHLORIDE SERPL-SCNC: 108 MMOL/L (ref 96–112)
CO2 SERPL-SCNC: 17 MMOL/L (ref 20–33)
COLOR UR: YELLOW
CREAT SERPL-MCNC: 0.79 MG/DL (ref 0.5–1.4)
EOSINOPHIL # BLD AUTO: 0.26 K/UL (ref 0–0.51)
EOSINOPHIL NFR BLD: 2 % (ref 0–6.9)
ERYTHROCYTE [DISTWIDTH] IN BLOOD BY AUTOMATED COUNT: 44 FL (ref 35.9–50)
EST. AVERAGE GLUCOSE BLD GHB EST-MCNC: 111 MG/DL
GLOBULIN SER CALC-MCNC: 3.2 G/DL (ref 1.9–3.5)
GLUCOSE SERPL-MCNC: 75 MG/DL (ref 65–99)
GLUCOSE UR STRIP.AUTO-MCNC: NEGATIVE MG/DL
HBA1C MFR BLD: 5.5 % (ref 0–5.6)
HCT VFR BLD AUTO: 42.6 % (ref 37–47)
HGB BLD-MCNC: 14.3 G/DL (ref 12–16)
IMM GRANULOCYTES # BLD AUTO: 0.06 K/UL (ref 0–0.11)
IMM GRANULOCYTES NFR BLD AUTO: 0.5 % (ref 0–0.9)
KETONES UR STRIP.AUTO-MCNC: NEGATIVE MG/DL
LACTATE BLD-SCNC: 1.9 MMOL/L (ref 0.5–2)
LEUKOCYTE ESTERASE UR QL STRIP.AUTO: NEGATIVE
LIPASE SERPL-CCNC: 40 U/L (ref 7–58)
LYMPHOCYTES # BLD AUTO: 2.29 K/UL (ref 1–4.8)
LYMPHOCYTES NFR BLD: 18 % (ref 22–41)
MAGNESIUM SERPL-MCNC: 2.1 MG/DL (ref 1.5–2.5)
MCH RBC QN AUTO: 31.4 PG (ref 27–33)
MCHC RBC AUTO-ENTMCNC: 33.6 G/DL (ref 33.6–35)
MCV RBC AUTO: 93.6 FL (ref 81.4–97.8)
MICRO URNS: NORMAL
MONOCYTES # BLD AUTO: 0.57 K/UL (ref 0–0.85)
MONOCYTES NFR BLD AUTO: 4.5 % (ref 0–13.4)
NEUTROPHILS # BLD AUTO: 9.46 K/UL (ref 2–7.15)
NEUTROPHILS NFR BLD: 74.4 % (ref 44–72)
NITRITE UR QL STRIP.AUTO: NEGATIVE
NRBC # BLD AUTO: 0 K/UL
NRBC BLD-RTO: 0 /100 WBC
PH UR STRIP.AUTO: 5.5 [PH]
PLATELET # BLD AUTO: 364 K/UL (ref 164–446)
PMV BLD AUTO: 9 FL (ref 9–12.9)
POTASSIUM SERPL-SCNC: 3.5 MMOL/L (ref 3.6–5.5)
PROT SERPL-MCNC: 8.1 G/DL (ref 6–8.2)
PROT UR QL STRIP: NEGATIVE MG/DL
RBC # BLD AUTO: 4.55 M/UL (ref 4.2–5.4)
RBC UR QL AUTO: NEGATIVE
SODIUM SERPL-SCNC: 134 MMOL/L (ref 135–145)
SP GR UR STRIP.AUTO: <=1.005
TROPONIN I SERPL-MCNC: <0.02 NG/ML (ref 0–0.04)
WBC # BLD AUTO: 12.7 K/UL (ref 4.8–10.8)

## 2018-09-09 PROCEDURE — 71045 X-RAY EXAM CHEST 1 VIEW: CPT

## 2018-09-09 PROCEDURE — G0378 HOSPITAL OBSERVATION PER HR: HCPCS

## 2018-09-09 PROCEDURE — 83605 ASSAY OF LACTIC ACID: CPT

## 2018-09-09 PROCEDURE — 700101 HCHG RX REV CODE 250: Performed by: INTERNAL MEDICINE

## 2018-09-09 PROCEDURE — 81003 URINALYSIS AUTO W/O SCOPE: CPT

## 2018-09-09 PROCEDURE — 36415 COLL VENOUS BLD VENIPUNCTURE: CPT

## 2018-09-09 PROCEDURE — 99220 PR INITIAL OBSERVATION CARE,LEVL III: CPT | Performed by: INTERNAL MEDICINE

## 2018-09-09 PROCEDURE — 93005 ELECTROCARDIOGRAM TRACING: CPT | Performed by: EMERGENCY MEDICINE

## 2018-09-09 PROCEDURE — A9270 NON-COVERED ITEM OR SERVICE: HCPCS | Performed by: INTERNAL MEDICINE

## 2018-09-09 PROCEDURE — 85025 COMPLETE CBC W/AUTO DIFF WBC: CPT

## 2018-09-09 PROCEDURE — 83690 ASSAY OF LIPASE: CPT

## 2018-09-09 PROCEDURE — 90471 IMMUNIZATION ADMIN: CPT

## 2018-09-09 PROCEDURE — 700111 HCHG RX REV CODE 636 W/ 250 OVERRIDE (IP): Performed by: EMERGENCY MEDICINE

## 2018-09-09 PROCEDURE — 96375 TX/PRO/DX INJ NEW DRUG ADDON: CPT

## 2018-09-09 PROCEDURE — 700105 HCHG RX REV CODE 258: Performed by: EMERGENCY MEDICINE

## 2018-09-09 PROCEDURE — 700111 HCHG RX REV CODE 636 W/ 250 OVERRIDE (IP): Performed by: INTERNAL MEDICINE

## 2018-09-09 PROCEDURE — 700102 HCHG RX REV CODE 250 W/ 637 OVERRIDE(OP): Performed by: INTERNAL MEDICINE

## 2018-09-09 PROCEDURE — 90686 IIV4 VACC NO PRSV 0.5 ML IM: CPT | Performed by: INTERNAL MEDICINE

## 2018-09-09 PROCEDURE — 70450 CT HEAD/BRAIN W/O DYE: CPT

## 2018-09-09 PROCEDURE — 84484 ASSAY OF TROPONIN QUANT: CPT

## 2018-09-09 PROCEDURE — 96374 THER/PROPH/DIAG INJ IV PUSH: CPT

## 2018-09-09 PROCEDURE — 99285 EMERGENCY DEPT VISIT HI MDM: CPT

## 2018-09-09 PROCEDURE — 80053 COMPREHEN METABOLIC PANEL: CPT

## 2018-09-09 PROCEDURE — 83036 HEMOGLOBIN GLYCOSYLATED A1C: CPT

## 2018-09-09 PROCEDURE — 83735 ASSAY OF MAGNESIUM: CPT

## 2018-09-09 RX ORDER — BISACODYL 10 MG
10 SUPPOSITORY, RECTAL RECTAL
Status: DISCONTINUED | OUTPATIENT
Start: 2018-09-09 | End: 2018-09-11 | Stop reason: HOSPADM

## 2018-09-09 RX ORDER — ONDANSETRON 2 MG/ML
4 INJECTION INTRAMUSCULAR; INTRAVENOUS EVERY 4 HOURS PRN
Status: DISCONTINUED | OUTPATIENT
Start: 2018-09-09 | End: 2018-09-11 | Stop reason: HOSPADM

## 2018-09-09 RX ORDER — POLYETHYLENE GLYCOL 3350 17 G/17G
1 POWDER, FOR SOLUTION ORAL
Status: DISCONTINUED | OUTPATIENT
Start: 2018-09-09 | End: 2018-09-11 | Stop reason: HOSPADM

## 2018-09-09 RX ORDER — PROMETHAZINE HYDROCHLORIDE 25 MG/1
12.5-25 TABLET ORAL EVERY 4 HOURS PRN
Status: DISCONTINUED | OUTPATIENT
Start: 2018-09-09 | End: 2018-09-11 | Stop reason: HOSPADM

## 2018-09-09 RX ORDER — LEVOTHYROXINE SODIUM 0.1 MG/1
100 TABLET ORAL
Status: DISCONTINUED | OUTPATIENT
Start: 2018-09-10 | End: 2018-09-11 | Stop reason: HOSPADM

## 2018-09-09 RX ORDER — ACETAMINOPHEN 325 MG/1
650 TABLET ORAL EVERY 6 HOURS PRN
Status: DISCONTINUED | OUTPATIENT
Start: 2018-09-09 | End: 2018-09-11 | Stop reason: HOSPADM

## 2018-09-09 RX ORDER — ATORVASTATIN CALCIUM 40 MG/1
80 TABLET, FILM COATED ORAL EVERY EVENING
Status: DISCONTINUED | OUTPATIENT
Start: 2018-09-09 | End: 2018-09-11 | Stop reason: HOSPADM

## 2018-09-09 RX ORDER — SODIUM CHLORIDE 9 MG/ML
INJECTION, SOLUTION INTRAVENOUS CONTINUOUS
Status: DISCONTINUED | OUTPATIENT
Start: 2018-09-09 | End: 2018-09-10

## 2018-09-09 RX ORDER — ONDANSETRON 2 MG/ML
4 INJECTION INTRAMUSCULAR; INTRAVENOUS ONCE
Status: COMPLETED | OUTPATIENT
Start: 2018-09-09 | End: 2018-09-09

## 2018-09-09 RX ORDER — SODIUM CHLORIDE AND POTASSIUM CHLORIDE 150; 900 MG/100ML; MG/100ML
INJECTION, SOLUTION INTRAVENOUS CONTINUOUS
Status: DISCONTINUED | OUTPATIENT
Start: 2018-09-09 | End: 2018-09-11 | Stop reason: HOSPADM

## 2018-09-09 RX ORDER — ASPIRIN 81 MG/1
324 TABLET, CHEWABLE ORAL DAILY
Status: DISCONTINUED | OUTPATIENT
Start: 2018-09-09 | End: 2018-09-11 | Stop reason: HOSPADM

## 2018-09-09 RX ORDER — ONDANSETRON 4 MG/1
4 TABLET, ORALLY DISINTEGRATING ORAL EVERY 4 HOURS PRN
Status: DISCONTINUED | OUTPATIENT
Start: 2018-09-09 | End: 2018-09-11 | Stop reason: HOSPADM

## 2018-09-09 RX ORDER — PROMETHAZINE HYDROCHLORIDE 25 MG/1
12.5-25 SUPPOSITORY RECTAL EVERY 4 HOURS PRN
Status: DISCONTINUED | OUTPATIENT
Start: 2018-09-09 | End: 2018-09-11 | Stop reason: HOSPADM

## 2018-09-09 RX ORDER — IBUPROFEN 800 MG/1
800 TABLET ORAL 2 TIMES DAILY PRN
Status: ON HOLD | COMMUNITY
End: 2019-05-13

## 2018-09-09 RX ORDER — ESTRADIOL 0.04 MG/D
1 FILM, EXTENDED RELEASE TRANSDERMAL
COMMUNITY
End: 2021-10-25 | Stop reason: ALTCHOICE

## 2018-09-09 RX ORDER — ASPIRIN 600 MG/1
300 SUPPOSITORY RECTAL DAILY
Status: DISCONTINUED | OUTPATIENT
Start: 2018-09-09 | End: 2018-09-11 | Stop reason: HOSPADM

## 2018-09-09 RX ORDER — LORAZEPAM 2 MG/ML
0.5 INJECTION INTRAMUSCULAR EVERY 4 HOURS PRN
Status: DISCONTINUED | OUTPATIENT
Start: 2018-09-09 | End: 2018-09-11 | Stop reason: HOSPADM

## 2018-09-09 RX ORDER — DOCUSATE SODIUM 100 MG/1
100 CAPSULE, LIQUID FILLED ORAL EVERY MORNING
COMMUNITY
End: 2021-12-18

## 2018-09-09 RX ORDER — AMOXICILLIN 250 MG
2 CAPSULE ORAL 2 TIMES DAILY
Status: DISCONTINUED | OUTPATIENT
Start: 2018-09-09 | End: 2018-09-09

## 2018-09-09 RX ORDER — ASPIRIN 325 MG
325 TABLET ORAL DAILY
Status: DISCONTINUED | OUTPATIENT
Start: 2018-09-09 | End: 2018-09-11 | Stop reason: HOSPADM

## 2018-09-09 RX ADMIN — ASPIRIN 325 MG ORAL TABLET 325 MG: 325 PILL ORAL at 17:14

## 2018-09-09 RX ADMIN — LORAZEPAM 0.5 MG: 2 INJECTION INTRAMUSCULAR; INTRAVENOUS at 21:13

## 2018-09-09 RX ADMIN — ENOXAPARIN SODIUM 40 MG: 100 INJECTION SUBCUTANEOUS at 17:14

## 2018-09-09 RX ADMIN — POTASSIUM CHLORIDE AND SODIUM CHLORIDE: 900; 150 INJECTION, SOLUTION INTRAVENOUS at 17:14

## 2018-09-09 RX ADMIN — ONDANSETRON 4 MG: 2 INJECTION INTRAMUSCULAR; INTRAVENOUS at 13:58

## 2018-09-09 RX ADMIN — ATORVASTATIN CALCIUM 80 MG: 40 TABLET, FILM COATED ORAL at 17:14

## 2018-09-09 RX ADMIN — SODIUM CHLORIDE 1000 ML: 9 INJECTION, SOLUTION INTRAVENOUS at 13:57

## 2018-09-09 RX ADMIN — INFLUENZA A VIRUS A/MICHIGAN/45/2015 X-275 (H1N1) ANTIGEN (FORMALDEHYDE INACTIVATED), INFLUENZA A VIRUS A/SINGAPORE/INFIMH-16-0019/2016 IVR-186 (H3N2) ANTIGEN (FORMALDEHYDE INACTIVATED), INFLUENZA B VIRUS B/PHUKET/3073/2013 ANTIGEN (FORMALDEHYDE INACTIVATED), AND INFLUENZA B VIRUS B/MARYLAND/15/2016 BX-69A ANTIGEN (FORMALDEHYDE INACTIVATED) 0.5 ML: 15; 15; 15; 15 INJECTION, SUSPENSION INTRAMUSCULAR at 18:43

## 2018-09-09 ASSESSMENT — LIFESTYLE VARIABLES
ALCOHOL_USE: YES
AVERAGE NUMBER OF DAYS PER WEEK YOU HAVE A DRINK CONTAINING ALCOHOL: 3
HAVE PEOPLE ANNOYED YOU BY CRITICIZING YOUR DRINKING: NO
CONSUMPTION TOTAL: NEGATIVE
EVER FELT BAD OR GUILTY ABOUT YOUR DRINKING: NO
HAVE YOU EVER FELT YOU SHOULD CUT DOWN ON YOUR DRINKING: NO
TOTAL SCORE: 0
EVER_SMOKED: NEVER
HOW MANY TIMES IN THE PAST YEAR HAVE YOU HAD 5 OR MORE DRINKS IN A DAY: 0
ON A TYPICAL DAY WHEN YOU DRINK ALCOHOL HOW MANY DRINKS DO YOU HAVE: 1
TOTAL SCORE: 0
TOTAL SCORE: 0
EVER HAD A DRINK FIRST THING IN THE MORNING TO STEADY YOUR NERVES TO GET RID OF A HANGOVER: NO

## 2018-09-09 ASSESSMENT — COGNITIVE AND FUNCTIONAL STATUS - GENERAL
SUGGESTED CMS G CODE MODIFIER MOBILITY: CH
SUGGESTED CMS G CODE MODIFIER DAILY ACTIVITY: CH
MOBILITY SCORE: 24
DAILY ACTIVITIY SCORE: 24

## 2018-09-09 ASSESSMENT — ENCOUNTER SYMPTOMS
CONSTIPATION: 0
PALPITATIONS: 0
FOCAL WEAKNESS: 1
TINGLING: 0
SHORTNESS OF BREATH: 0
SENSORY CHANGE: 1
LOSS OF CONSCIOUSNESS: 0
SPUTUM PRODUCTION: 0
FALLS: 0
NAUSEA: 0
VOMITING: 0
COUGH: 1
MYALGIAS: 0
DIARRHEA: 1
ABDOMINAL PAIN: 1
CHILLS: 0
DEPRESSION: 0
FEVER: 0
STRIDOR: 0
HEADACHES: 0
DIZZINESS: 1
WEAKNESS: 0
SPEECH CHANGE: 1

## 2018-09-09 ASSESSMENT — PATIENT HEALTH QUESTIONNAIRE - PHQ9
2. FEELING DOWN, DEPRESSED, IRRITABLE, OR HOPELESS: NOT AT ALL
1. LITTLE INTEREST OR PLEASURE IN DOING THINGS: NOT AT ALL
SUM OF ALL RESPONSES TO PHQ9 QUESTIONS 1 AND 2: 0

## 2018-09-09 ASSESSMENT — PAIN SCALES - GENERAL
PAINLEVEL_OUTOF10: 0
PAINLEVEL_OUTOF10: 4

## 2018-09-09 NOTE — ED PROVIDER NOTES
"ED Provider Note    CHIEF COMPLAINT  Chief Complaint   Patient presents with   • Seizure       HPI  Kathleen Alvarado is a 38 y.o. female who presents for evaluation of possible seizure activity.  The patient apparently was driving her 2 children when she suddenly did not feel well.  She states that she felt spacey and called her father.  Patient's father indicates that she could not speak though her eyes were open.  Patient states that she feels like she cannot move any of her extremities but feels like she might be weaker on the right side than the left.  The father did not describe any generalized tonic-clonic seizure activity.  The patient indicates she was seen 1 year ago for a similar episode but did not follow-up with neurology and was not placed on anticonvulsants at that time.  Patient has a long history of chronic abdominal pain and has had multiple abdominal surgeries, mostly recently by Dr. Javed.  Patient states over the last week she has been sick with upper respiratory symptoms with nasal congestion, green to brownish rhinorrhea along with a cough and possible fever.  She has had no vomiting or hematemesis.  She has had some mild diarrhea.  She occasionally has bloody stool but states that is chronic in nature for underlying GI disorders.  The patient has had a hysterectomy.  No other acute symptomatology or complaints.    REVIEW OF SYSTEMS  See HPI for further details. All other systems negative.    PAST MEDICAL HISTORY  Past Medical History:   Diagnosis Date   • Allergy     Seasonal   • Anesthesia     nausea/vomiting; pt's mother had heart issues   • Anxiety    • Bowel habit changes     constipation   • Hx of endometriosis    • Migraine    • Polycystic ovaries     abn menses, vag bleeding x 2 months- has now had hysterectomy   • Psychiatric problem     depression, anxiety   • Seizure (HCC) 06/2017    d/t \"thyroid level being off\"   • Thyroid condition     low thyroid       FAMILY HISTORY  Family " History   Problem Relation Age of Onset   • Arthritis Father         gout   • Heart Disease Paternal Grandfather    • Hypertension Unknown    • Stroke Unknown        SOCIAL HISTORY  Positive alcohol use; non-smoker; no drug use;    SURGICAL HISTORY  Past Surgical History:   Procedure Laterality Date   • LOW ANTERIOR RESECTION ROBOTIC XI N/A 3/14/2018    Procedure: LOW ANTERIOR RESECTION ROBOTIC XI- FOR SUBTOTAL COLECTOMY W/LOW ANTERIOR ANASTOMOSIS;  Surgeon: Tigre Javed M.D.;  Location: SURGERY [facility];  Service: General   • INGUINAL HERNIA REPAIR ROBOTIC Left 9/27/2017    Procedure: INGUINAL HERNIA REPAIR ROBOTIC RECURRENT;  Surgeon: Tigre Javed M.D.;  Location: SURGERY [facility];  Service: Gen Robotic   • GROIN EXPLORATION Left 8/30/2016    Procedure: GROIN EXPLORATION;  Surgeon: Tigre Javed M.D.;  Location: SURGERY [facility];  Service:    • VENTRAL HERNIA REPAIR  8/30/2016    Procedure: VENTRAL HERNIA REPAIR ;  Surgeon: Tigre Javed M.D.;  Location: SURGERY [facility];  Service:    • COLON RESECTION ROBOTIC Right 12/7/2015    Procedure: COLON RESECTION ROBOTIC for: Hemicolectomy;  Surgeon: Tigre Javed M.D.;  Location: SURGERY [facility];  Service:    • LAPAROSCOPY ROBOTIC XI  9/1/2015    Procedure: LAPAROSCOPY ROBOTIC XI FOR: ENTEROLYSIS AND METAL CLIP REMOVAL;  Surgeon: Tigre Javed M.D.;  Location: SURGERY [facility];  Service:    • LAPAROSCOPY  1/15/2014    Performed by Heron Reyes M.D. at SURGERY [facility]   • PELVISCOPY  4/3/2013    Performed by Genaro Hadley M.D. at SURGERY SAME DAY [facility]   • BREAST IMPLANT REVISION  10/11/2011    Performed by ANA ROBERTS at [facility]   • MASTOPEXY  10/11/2011    Performed by ANA ROBERTS at [facility]   • SCAR REVISION  10/11/2011    Performed by ANA ROBERTS at [facility]   • ABDOMINOPLASTY  4/28/2011    Performed by ANA ROBERTS at [facility]  "[facility]   • LIPOSUCTION  4/28/2011    Performed by ANA ROBERTS at SURGERY [facility]   • VAGINAL HYSTERECTOMY TOTAL  4/4/2011    Performed by TRISTIN LOPEZ at SURGERY SAME DAY [facility]   • CYSTOSCOPY  4/4/2011    Performed by TRISTIN LOPEZ at SURGERY SAME DAY [facility]   • PRIMARY C SECTION  1/13/2011    Performed by VIET CISNEROS at LABOR AND DELIVERY   • ANA BY LAPAROSCOPY  6/6/2009    Performed by LOUIS FREMEAN at SURGERY [facility]S   • CARPAL TUNNEL RELEASE  3/24/2009    Performed by MARSHA ALCANTARA at SURGERY SAME DAY [facility] ORS   • PELVISCOPY  9/3/08    Performed by TAWNY TOBAR at SURGERY SAME DAY [facility]   • LYSIS ADHESIONS GYN  9/3/08    Performed by TAWNY TOBAR at SURGERY SAME DAY [facility]   • HYSTEROSCOPY WITH VIDEO OPERATIVE  4/7/08    Performed by TAWNY TOBAR at SURGERY [facility]   • LAPAROSCOPY  11/15/2007    at [facility] out pt clinic seven laparoscopies (for enhdometriosis)   • BREAST IMPLANT REVISION      augmentation twice   • PB CHOLECYSTOENTEROSTOMY  7 weeks ago   • TONSILLECTOMY         CURRENT MEDICATIONS  See nurse's notes    ALLERGIES  Allergies   Allergen Reactions   • Methylprednisolone      Increased heart rate, into a-fib.   • Morphine Swelling     Swelling; redness   • Tramadol Palpitations     palpitations   • Demerol Rash     Rash   • Dilaudid [Hydromorphone] Hives and Unspecified     \"Makes my entire body tight\"  Hydrocodone     • Latex Itching     Itch, rash   • Other Misc Itching     \"All Metals\" Blisters   • Sulfa Drugs Hives and Rash     Also feel \"high\"   • Tape Rash     Paper tape ok for IVs   • Trazodone Hives     Hives       PHYSICAL EXAM  VITAL SIGNS: /91   Pulse 82   Temp 36.2 °C (97.1 °F)   Resp 18   Wt 56.5 kg (124 lb 9 oz)   LMP 12/01/2011 (Approximate)   SpO2 99%   BMI 22.78 kg/m²    Constitutional: 38-year-old female, mildly anxious, awake, oriented x3  HENT: Normocephalic, " Atraumatic, Nares:Clear, Oropharynx: moist mildly dry, posterior pharynx:clear   Eyes: PERRL, EOMI, Conjunctiva normal, No discharge.   Neck: Normal range of motion, No tenderness, Supple, No stridor.   Lymphatic: No lymphadenopathy noted.   Cardiovascular: Regular rate and rhythm without mumurs, gallups, rubs   Thorax & Lungs: Mild bilateral rhonchi, No respiratory distress, No wheezing, no stridor, no rales. No chest tenderness.   Abdomen: Previous surgical scars identified, mild upper abdominal tenderness, nondistended, no organomegaly, positive bowel sounds normal in quality. No guarding or rebound.  Skin: Mildly decreased skin turgor, pink, warm, dry. No rashes, petechiae, purpura. Normal capillary refill.   Back: No tenderness, No CVA tenderness.   Extremities: Intact distal pulses, No edema, No tenderness, No cyanosis,  Vascular: Pulses are 2+, symmetric in the upper and lower extremities.  Musculoskeletal: Good range of motion in all major joints. No tenderness to palpation or major deformities noted.   Neurologic: Alert & oriented x 3,  No gross focal deficits noted.  Motor 5/5; finger-nose is normal; sensation intact light touch throughout;  Psychiatric: Affect normal, Judgment normal, Mood normal.       RADIOLOGY/PROCEDURES  CT-HEAD W/O   Final Result         1. No acute intracranial abnormality. No evidence of acute intracranial hemorrhage or mass lesion.               DX-CHEST-PORTABLE (1 VIEW)   Final Result         1. No acute cardiopulmonary abnormalities are identified.            COURSE & MEDICAL DECISION MAKING  Pertinent Labs & Imaging studies reviewed. (See chart for details)  1.  Monitor  2.  IV normal saline; IV fluids administered for clinical dehydration; reevaluation after IV fluids revealed stable status;  3.    Laboratory studies: CBC shows white count of 12.7, 74% neutrophils, 18% lymphocytes, 4% monocytes, hemoglobin 14.3, crit 42.6; CMP shows sodium 134, potassium 3.5, CO2 17, AST 52,  otherwise within normal; lactic acid 1.9; urinalysis negative; troponin less than 0.02; lipase 40;    Discussion/consultation: At this time, the patient presents for evaluation of altered mental status.  Patient had a normal neurological exam upon arrival here. During her workup the patient had further episode of unresponsiveness.  I evaluate the patient emergently.  Patient did not have any generalized tonic-clonic seizure activity.  The patient appeared to be having an expressive aphasia with attempts to say her name but unable to get it out.  The patient had generalized weakness but no profound unilateral motor weakness.  This episode lasted approximately 5 minutes in duration.  In addition, the patient's complained of upper respiratory symptoms but no evidence pneumonia.  She is also had some intermittent abdominal pain but does not have an acute abdomen.  At this time, I spoke with the hospitalist on call.  Patient will be admitted for further monitoring, treatment, and care.    FINAL IMPRESSION  1. Transient alteration of awareness    2. Epigastric pain    3. Upper respiratory tract infection, unspecified type           PLAN  1.  Patient will be admitted for further monitoring, treatment, and care.  Discussion/consultation: At this time, the patient presents for evaluation of altered mental status.  Patient had a normal neurological exam upon arrival here.    Electronically signed by: Guy G Gansert, 9/9/2018 12:48 PM

## 2018-09-09 NOTE — ASSESSMENT & PLAN NOTE
-Patient generally takes Xanax at home, will hold this and instead give IV Ativan as needed anxiety, seizure activity

## 2018-09-09 NOTE — ED NOTES
"Patient speaking in full and clear sentences. Ambulated to restroom in steady gait. Patient reports that she feels \"fine\".   "

## 2018-09-09 NOTE — ASSESSMENT & PLAN NOTE
-Likely reactive, no additional sign of infection, patient did just have was likely a viral infection but this seems to be improving

## 2018-09-09 NOTE — H&P
[facility] Medicine History & Physical Note    Date of Service  9/9/2018    Primary Care Physician  MARGA Chin    Consultants  None    Code Status  Full    Chief Complaint  Difficulty speaking    History of Presenting Illness  38 y.o. female who presented 9/9/2018 with difficulty speaking, right-sided weakness.  Patient states over the last week she has had some upper respiratory symptoms, had significant congestion today.  Patient states she was in [place], states she did not feel right, began having tingling and numbness, especially her right face.  Patient went out to her car and then noted that she was unable to speak.  Prior to this patient was able to call her father who came and brought her to the hospital.  They state this lasted approximately an hour.  Patient had another episode that was much shorter, lasting additional 10 minutes.  ERP did see her during this episode, noted she had some trouble speaking but was able to follow commands, no seizure-like activity.  Whenever the father arrived he stated that her eyes were open but she was unable to get words out, seemed like she was trying to.  Patient did have a similar episode in 2008, had a full workup with no abnormality noted.  Patient states it happened again a year ago, came to the ER and then went home from there.  Patient states she does have chronic diarrhea, used to have chronic constipation.  Patient states her diarrhea has been since she had a partial colectomy.  Patient used to be on narcotics however these are no longer being taken.    Review of Systems  Review of Systems   Constitutional: Negative for chills, fever and malaise/fatigue.   HENT: Positive for congestion.    Respiratory: Positive for cough. Negative for sputum production, shortness of breath and stridor.    Cardiovascular: Negative for chest pain, palpitations and leg swelling.   Gastrointestinal: Positive for abdominal pain and diarrhea. Negative for constipation,  nausea and vomiting.   Genitourinary: Negative for dysuria and urgency.   Musculoskeletal: Negative for falls and myalgias.   Neurological: Positive for dizziness, sensory change, speech change and focal weakness. Negative for tingling, loss of consciousness, weakness and headaches.   Psychiatric/Behavioral: Negative for depression and suicidal ideas.   All other systems reviewed and are negative.      Past Medical History   has a past medical history of Allergy; Anesthesia; Anxiety; Bowel habit changes; endometriosis; Migraine; Polycystic ovaries; Psychiatric problem; Seizure (HCC) (06/2017); and Thyroid condition.    Surgical History   has a past surgical history that includes laparoscopy (11/15/2007); tonsillectomy; breast implant revision; sandra by laparoscopy (6/6/2009); pr cholecystoenterostomy (7 weeks ago); carpal tunnel release (3/24/2009); hysteroscopy with video operative (4/7/08); pelviscopy (9/3/08); lysis adhesions gyn (9/3/08); primary c section (1/13/2011); vaginal hysterectomy total (4/4/2011); cystoscopy (4/4/2011); abdominoplasty (4/28/2011); liposuction (4/28/2011); breast implant revision (10/11/2011); mastopexy (10/11/2011); scar revision (10/11/2011); pelviscopy (4/3/2013); laparoscopy (1/15/2014); laparoscopy robotic xi (9/1/2015); colon resection robotic (Right, 12/7/2015); groin exploration (Left, 8/30/2016); ventral hernia repair (8/30/2016); inguinal hernia repair robotic (Left, 9/27/2017); and low anterior resection robotic xi (N/A, 3/14/2018).     Family History  family history includes Arthritis in her father; Heart Disease in her paternal grandfather; Hypertension in her unknown relative; Stroke in her unknown relative.     Social History   reports that she has never smoked. She has never used smokeless tobacco. She reports that she drinks alcohol. She reports that she does not use drugs.    Allergies  Allergies   Allergen Reactions   • Methylprednisolone      Increased heart rate,  "into a-fib.   • Morphine Swelling     Swelling; redness   • Tramadol Palpitations     palpitations   • Demerol Rash     Rash   • Dilaudid [Hydromorphone] Hives and Unspecified     \"Makes my entire body tight\"  Hydrocodone     • Latex Itching     Itch, rash   • Other Misc Itching     \"All Metals\" Blisters   • Sulfa Drugs Hives and Rash     Also feel \"high\"   • Tape Rash     Paper tape ok for IVs   • Trazodone Hives     Hives       Medications  Prior to Admission Medications   Prescriptions Last Dose Informant Patient Reported? Taking?   alprazolam (XANAX) 2 MG tablet 9/8/2018 at 2030 Patient Yes No   Sig: Take 2 mg by mouth at bedtime as needed for Sleep.   docusate sodium (COLACE) 100 MG Cap 9/9/2018 at 0800 Patient Yes Yes   Sig: Take 100 mg by mouth every morning.   estradiol (VIVELLE-DOT) 0.0375 MG/24HR patch 9/9/2018 at 0800 Patient Yes Yes   Sig: Apply 1 Patch to skin as directed 2X A WEEK. Change every Sunday and Wednesday   ibuprofen (MOTRIN) 800 MG Tab 9/8/2018 at 2030 Patient Yes Yes   Sig: Take 800 mg by mouth 2 times a day as needed (Pain).   levothyroxine (SYNTHROID) 100 MCG Tab 9/9/2018 at 0800 Patient Yes No   Sig: Take 100 mcg by mouth Every morning on an empty stomach. Must have Synthroid- brand name- not levothyroxine      Facility-Administered Medications: None       Physical Exam  Blood Pressure: 136/91   Temperature: 36.2 °C (97.1 °F)   Pulse: 77   Respiration: 17   Pulse Oximetry: 93 %     Physical Exam   Constitutional: She is oriented to person, place, and time. She appears well-developed. No distress.   HENT:   Head: Normocephalic and atraumatic.   Mouth/Throat: Oropharynx is clear and moist. No oropharyngeal exudate.   Eyes: Right eye exhibits no discharge. Left eye exhibits no discharge.   Neck: Neck supple. No tracheal deviation present.   Cardiovascular: Normal rate and regular rhythm.  Exam reveals no gallop and no friction rub.    No murmur heard.  Pulmonary/Chest: Effort normal and " breath sounds normal. No stridor. No respiratory distress. She has no wheezes. She has no rales. She exhibits no tenderness.   Abdominal: Soft. Bowel sounds are normal. She exhibits no distension. There is no tenderness.   Musculoskeletal: Normal range of motion. She exhibits no edema or tenderness.   Lymphadenopathy:     She has no cervical adenopathy.   Neurological: She is alert and oriented to person, place, and time. No cranial nerve deficit.   Skin: Skin is warm and dry. No rash noted. She is not diaphoretic. No erythema.   Psychiatric: She has a normal mood and affect. Her behavior is normal. Judgment and thought content normal.   Nursing note and vitals reviewed.      Laboratory:  Recent Labs      09/09/18   1241   WBC  12.7*   RBC  4.55   HEMOGLOBIN  14.3   HEMATOCRIT  42.6   MCV  93.6   MCH  31.4   MCHC  33.6   RDW  44.0   PLATELETCT  364   MPV  9.0     Recent Labs      09/09/18   1241   SODIUM  134*   POTASSIUM  3.5*   CHLORIDE  108   CO2  17*   GLUCOSE  75   BUN  19   CREATININE  0.79   CALCIUM  9.6     Recent Labs      09/09/18   1241   ALTSGPT  52*   ASTSGOT  32   ALKPHOSPHAT  61   TBILIRUBIN  0.3   LIPASE  40   GLUCOSE  75                 Lab Results   Component Value Date    TROPONINI <0.02 09/09/2018       Urinalysis:    Recent Labs      09/09/18   1318   SPECGRAVITY  <=1.005   GLUCOSEUR  Negative   KETONES  Negative   NITRITE  Negative   LEUKESTERAS  Negative        Imaging:  CT-HEAD W/O   Final Result         1. No acute intracranial abnormality. No evidence of acute intracranial hemorrhage or mass lesion.               DX-CHEST-PORTABLE (1 VIEW)   Final Result         1. No acute cardiopulmonary abnormalities are identified.      MR-BRAIN-W/O    (Results Pending)         Assessment/Plan:  I anticipate this patient is appropriate for observation status at this time.    Stroke-like symptoms   Assessment & Plan    -Patient was having a fascia, weakness and numbness on the right side, all have  resolved currently  -Obtain MRI brain  -If this is positive for stroke will consider additional imaging however only the MRI brain is deemed necessary at this time  -ERP did note the shorter episode in the emergency department, no sign of seizures        Hypokalemia- (present on admission)   Assessment & Plan    -Mild, place IV potassium and with IV fluids  -Repeat CMP in the morning        Hyponatremia   Assessment & Plan    -Mild, likely due to dehydration  -start IV fluids  -Patient states she has been having diarrhea recently, this is not uncommon for her since she had part of her colon removed        Leukocytosis- (present on admission)   Assessment & Plan    -Likely reactive, no additional sign of infection, patient did just have was likely a viral infection but this seems to be improving        Hypothyroidism- (present on admission)   Assessment & Plan    -Continue Synthroid        Anxiety- (present on admission)   Assessment & Plan    -Patient generally takes Xanax at home, will hold this and instead give IV Ativan as needed anxiety, seizure activity            VTE prophylaxis: Lovenox

## 2018-09-09 NOTE — ED NOTES
Patient brought in by father post seizure. Seizure occurred 1 hour ago per father, pt has hx of seizures.

## 2018-09-09 NOTE — ASSESSMENT & PLAN NOTE
MRI of brain negative for acute findings.  EEG is normal.  Discussed case with neurologist on call for [facility], Dr. Mast, he recommends CTA of brain and neck and hypercoagulable state workup.  Will order labs and CTA, there are mechanical issues that should be fixed by tomorrow; patient agreeable to staying for test.  Suspect possible migraine variant but need to rule out vascular issues.  Fioricet prn for headache.  If any issues are uncovered that require neurology or neurosurgery consult she will have to be transferred to the [facility], patient aware.

## 2018-09-10 ENCOUNTER — APPOINTMENT (OUTPATIENT)
Dept: RADIOLOGY | Facility: MEDICAL CENTER | Age: 38
End: 2018-09-10
Attending: INTERNAL MEDICINE
Payer: COMMERCIAL

## 2018-09-10 ENCOUNTER — PATIENT OUTREACH (OUTPATIENT)
Dept: HEALTH INFORMATION MANAGEMENT | Facility: OTHER | Age: 38
End: 2018-09-10

## 2018-09-10 LAB
ALBUMIN SERPL BCP-MCNC: 3.5 G/DL (ref 3.2–4.9)
ALBUMIN/GLOB SERPL: 1.4 G/DL
ALP SERPL-CCNC: 48 U/L (ref 30–99)
ALT SERPL-CCNC: 38 U/L (ref 2–50)
ANION GAP SERPL CALC-SCNC: 4 MMOL/L (ref 0–11.9)
AST SERPL-CCNC: 25 U/L (ref 12–45)
BILIRUB SERPL-MCNC: 0.7 MG/DL (ref 0.1–1.5)
BUN SERPL-MCNC: 6 MG/DL (ref 8–22)
CALCIUM SERPL-MCNC: 8.6 MG/DL (ref 8.4–10.2)
CHLORIDE SERPL-SCNC: 112 MMOL/L (ref 96–112)
CHOLEST SERPL-MCNC: 158 MG/DL (ref 100–199)
CO2 SERPL-SCNC: 22 MMOL/L (ref 20–33)
CREAT SERPL-MCNC: 0.69 MG/DL (ref 0.5–1.4)
ERYTHROCYTE [DISTWIDTH] IN BLOOD BY AUTOMATED COUNT: 45 FL (ref 35.9–50)
ERYTHROCYTE [SEDIMENTATION RATE] IN BLOOD BY WESTERGREN METHOD: 7 MM/HOUR (ref 0–20)
GLOBULIN SER CALC-MCNC: 2.5 G/DL (ref 1.9–3.5)
GLUCOSE SERPL-MCNC: 97 MG/DL (ref 65–99)
HCT VFR BLD AUTO: 36.2 % (ref 37–47)
HDLC SERPL-MCNC: 51 MG/DL
HGB BLD-MCNC: 11.8 G/DL (ref 12–16)
LDLC SERPL CALC-MCNC: 65 MG/DL
MCH RBC QN AUTO: 31.1 PG (ref 27–33)
MCHC RBC AUTO-ENTMCNC: 32.6 G/DL (ref 33.6–35)
MCV RBC AUTO: 95.3 FL (ref 81.4–97.8)
PLATELET # BLD AUTO: 266 K/UL (ref 164–446)
PMV BLD AUTO: 9.2 FL (ref 9–12.9)
POTASSIUM SERPL-SCNC: 4.1 MMOL/L (ref 3.6–5.5)
PROT SERPL-MCNC: 6 G/DL (ref 6–8.2)
RBC # BLD AUTO: 3.8 M/UL (ref 4.2–5.4)
SODIUM SERPL-SCNC: 138 MMOL/L (ref 135–145)
TRIGL SERPL-MCNC: 208 MG/DL (ref 0–149)
WBC # BLD AUTO: 6.8 K/UL (ref 4.8–10.8)

## 2018-09-10 PROCEDURE — 81241 F5 GENE: CPT

## 2018-09-10 PROCEDURE — 85245 CLOT FACTOR VIII VW RISTOCTN: CPT

## 2018-09-10 PROCEDURE — A9270 NON-COVERED ITEM OR SERVICE: HCPCS | Performed by: HOSPITALIST

## 2018-09-10 PROCEDURE — 80053 COMPREHEN METABOLIC PANEL: CPT

## 2018-09-10 PROCEDURE — 700102 HCHG RX REV CODE 250 W/ 637 OVERRIDE(OP): Performed by: HOSPITALIST

## 2018-09-10 PROCEDURE — A9270 NON-COVERED ITEM OR SERVICE: HCPCS | Performed by: INTERNAL MEDICINE

## 2018-09-10 PROCEDURE — 700102 HCHG RX REV CODE 250 W/ 637 OVERRIDE(OP): Performed by: INTERNAL MEDICINE

## 2018-09-10 PROCEDURE — 85027 COMPLETE CBC AUTOMATED: CPT

## 2018-09-10 PROCEDURE — 85303 CLOT INHIBIT PROT C ACTIVITY: CPT

## 2018-09-10 PROCEDURE — 85306 CLOT INHIBIT PROT S FREE: CPT | Mod: 91

## 2018-09-10 PROCEDURE — 80061 LIPID PANEL: CPT

## 2018-09-10 PROCEDURE — 96376 TX/PRO/DX INJ SAME DRUG ADON: CPT

## 2018-09-10 PROCEDURE — 99226 PR SUBSEQUENT OBSERVATION CARE,LEVEL III: CPT | Performed by: HOSPITALIST

## 2018-09-10 PROCEDURE — 95951 HCHG EEG-VIDEO-24HR: CPT | Mod: 52

## 2018-09-10 PROCEDURE — G0378 HOSPITAL OBSERVATION PER HR: HCPCS

## 2018-09-10 PROCEDURE — 700111 HCHG RX REV CODE 636 W/ 250 OVERRIDE (IP): Performed by: INTERNAL MEDICINE

## 2018-09-10 PROCEDURE — 700101 HCHG RX REV CODE 250: Performed by: INTERNAL MEDICINE

## 2018-09-10 PROCEDURE — 70551 MRI BRAIN STEM W/O DYE: CPT

## 2018-09-10 PROCEDURE — 86038 ANTINUCLEAR ANTIBODIES: CPT

## 2018-09-10 PROCEDURE — 85652 RBC SED RATE AUTOMATED: CPT

## 2018-09-10 PROCEDURE — 85246 CLOT FACTOR VIII VW ANTIGEN: CPT

## 2018-09-10 PROCEDURE — 85240 CLOT FACTOR VIII AHG 1 STAGE: CPT

## 2018-09-10 RX ORDER — BUTALBITAL, ACETAMINOPHEN AND CAFFEINE 50; 325; 40 MG/1; MG/1; MG/1
1 TABLET ORAL EVERY 6 HOURS PRN
Status: DISCONTINUED | OUTPATIENT
Start: 2018-09-10 | End: 2018-09-11 | Stop reason: HOSPADM

## 2018-09-10 RX ORDER — AMOXICILLIN 250 MG
2 CAPSULE ORAL 2 TIMES DAILY
Status: DISCONTINUED | OUTPATIENT
Start: 2018-09-10 | End: 2018-09-11 | Stop reason: HOSPADM

## 2018-09-10 RX ADMIN — POTASSIUM CHLORIDE AND SODIUM CHLORIDE: 900; 150 INJECTION, SOLUTION INTRAVENOUS at 06:22

## 2018-09-10 RX ADMIN — ENOXAPARIN SODIUM 40 MG: 100 INJECTION SUBCUTANEOUS at 06:23

## 2018-09-10 RX ADMIN — ASPIRIN 325 MG ORAL TABLET 325 MG: 325 PILL ORAL at 06:23

## 2018-09-10 RX ADMIN — LEVOTHYROXINE SODIUM 100 MCG: 100 TABLET ORAL at 06:23

## 2018-09-10 RX ADMIN — ONDANSETRON 4 MG: 2 INJECTION INTRAMUSCULAR; INTRAVENOUS at 20:24

## 2018-09-10 RX ADMIN — BUTALBITAL, ACETAMINOPHEN, AND CAFFEINE 1 TABLET: 50; 325; 40 TABLET ORAL at 14:42

## 2018-09-10 RX ADMIN — LORAZEPAM 0.5 MG: 2 INJECTION INTRAMUSCULAR; INTRAVENOUS at 20:25

## 2018-09-10 RX ADMIN — SENNOSIDES AND DOCUSATE SODIUM 2 TABLET: 8.6; 5 TABLET ORAL at 18:18

## 2018-09-10 ASSESSMENT — ENCOUNTER SYMPTOMS
WEIGHT LOSS: 0
LOSS OF CONSCIOUSNESS: 0
BACK PAIN: 0
BRUISES/BLEEDS EASILY: 0
NERVOUS/ANXIOUS: 0
RESPIRATORY NEGATIVE: 1
TREMORS: 0
CHILLS: 0
GASTROINTESTINAL NEGATIVE: 1
SENSORY CHANGE: 1
ABDOMINAL PAIN: 0
SEIZURES: 0
TINGLING: 0
MYALGIAS: 0
WEAKNESS: 0
SHORTNESS OF BREATH: 0
DIZZINESS: 0
CONSTITUTIONAL NEGATIVE: 1
VOMITING: 0
FEVER: 0
MUSCULOSKELETAL NEGATIVE: 1
FOCAL WEAKNESS: 1
DEPRESSION: 0
FLANK PAIN: 0
HEADACHES: 1
NAUSEA: 0
COUGH: 0
SPEECH CHANGE: 1
PSYCHIATRIC NEGATIVE: 1
CARDIOVASCULAR NEGATIVE: 1

## 2018-09-10 ASSESSMENT — PAIN SCALES - GENERAL
PAINLEVEL_OUTOF10: 0

## 2018-09-10 NOTE — PROGRESS NOTES
1600 Pt arrived to room 302-1 via gurney, ambulated to bed.    1640 Family at bedside, pt is smiling & talking to family and friend. Currently no s/s of stroke-no deficit noticed.    1800 Tele Strip at 1630 shows SR with HR of 67  Measurements: 0.16/ 0.08/ 0.36    Tele Shift Summary:  Rhythm: SR  Rate: 60-90's.     1843 Flu vaccine given as requested by pt.    1900 Report given to NOC nurse, Courtney, at bedside.

## 2018-09-10 NOTE — EEG PROGRESS NOTE
VIDEO ELECTROENCEPHALOGRAM REPORT      Referring MD: Dr. Garcia.     DOS: 9/10/2018 (total recording of 26 minutes).     INDICATION:  Kathleen Alvarado 38 y.o. female presenting with numbness, weakness, trouble speaking. Rule out seizure.     CURRENT ANTIEPILEPTIC REGIMEN: Lorazepam.     TECHNIQUE: 30 channel video electroencephalogram (EEG) was performed in accordance with the international 10-20 system. The study was reviewed in bipolar and referential montages. The recording examined the patient during wakeful only state.     DESCRIPTION OF THE RECORD:  During the wakefulness, the background showed a symmetrical 12 Hz alpha activity posteriorly with amplitude of 70 mV.  There was reactivity to eye closure/opening.  A normal anterior-posterior gradient was noted with faster beta frequencies seen anteriorly.     ACTIVATION PROCEDURES:   Intermittent Photic stimulation was performed in a stepwise fashion from 1 to 30 Hz and elicited a normal response (photic driving), most noticeable in the posterior leads.      ICTAL AND/OR INTERICTAL FINDINGS:   No focal or generalized epileptiform activity noted. No regional slowing was seen during this routine study.  No clinical events or seizures were reported or recorded during the study.     EKG: sampling of the EKG recording demonstrated sinus rhythm.    EVENTS:  None.     INTERPRETATION:  This is a normal video EEG recording in the awake only state.  Clinical correlation is recommended.    Note: A normal EEG does not rule out epilepsy.  If the clinical suspicion remains high for seizures, a prolonged recording to capture clinical or subclinical events may be helpful.        Matthew Mast MD   Epilepsy and Neurodiagnostics.   Clinical  of Neurology [facility].   Diplomate in Neurology, Epilepsy, and Electrodiagnostic Medicine.   Office: [phone]  Fax: [phone]

## 2018-09-10 NOTE — CARE PLAN
Problem: Safety  Goal: Will remain free from injury  Pt encouraged to call for assistance, bed in low position, call light within reach. Treaded slipper socks on pt, mobility assessed (SBA due to Stroke like symptoms) & appropriate sign placed.    Problem: Knowledge Deficit  Goal: Knowledge of disease process/condition, treatment plan, diagnostic tests, and medications will improve  POC discussed with pt-medications, lab, tests (CT-head, MRI-brain), disease process (Possible TIA)-verbalized understanding.

## 2018-09-10 NOTE — PROGRESS NOTES
1910: Bedside report received from JERROD Mcnally. Patient resting comfortably in bed. No questions/concerns/needs verbalized at this time. Falls precautions remain in place.    1950: Rounded on patient, sitting up in bed, watching TV. Alert and oriented x 4. VSS and afebrile. SpO2 WNL on RA. Denies pain/discomfort at this time. IV patent and NS + 20 KCL infusing at 100mL/hr. Neuro check done, no s/s of neurologic deficit noted. NIHSS = 0. Ambulated to bathroom with SBA. Gait steady, no ataxia noted. Plan of care discussed and questions answered. Needs addressed. Falls precautions in place. No further questions or concerns at this time. Will continue to monitor.     2121: Patient complaining of edema to bilateral hands and feet. Trace edema present. Additionally complaints of mild GI upset. Patient is requesting IVF rate to be slowed overnight. Page sent to Dr. Leonard. Awaiting return call.    2123: Return call received from Dr. Leonard, new orders obtained for IVF rate decreased to 50mL/hr.     --------------------------------------------------------------------------------------------------------    Telemetry Summary    Rhythm NSR  Rate 60s - 80s  Ectopy Occasional PACs  ID 0.16  QRS 0.08  QT 0.36

## 2018-09-10 NOTE — PROGRESS NOTES
0830Report received, plan of care reviewed and discussed, assessment complete, oriented to room, bed alarm on, nonskid socks applied, advised to call for assistance.   1030 Discussed plan of care, encouraged and answered all questions, will continue to monitor.  1230 Neuro checks remain stable with exception on right eyed blurriness.  1430 Up in bed, no complaints, refusing IV fluids.  1630 MD at bedside, discussed plan of care, will continue to monitor.  1845 Report given to next shift.

## 2018-09-10 NOTE — PROGRESS NOTES
[facility] Medicine Daily Progress Note    Date of Service  9/10/2018    Chief Complaint  38 y.o. female admitted 9/9/2018 with right sided weakness and difficulty speaking.    Hospital Course    38 y.o. female who presented 9/9/2018 with difficulty speaking, right-sided weakness.  Patient states over the last week she has had some upper respiratory symptoms, had significant congestion today.  Patient states [place] was [place], states she did not feel right, began having tingling and numbness, especially her right face.  Patient went out to her car and then noted that she was unable to speak.  Prior to this patient was able to call her father who came and brought her to the hospital.  They state this lasted approximately an hour.  Patient had another episode that was much shorter, lasting additional 10 minutes.  ERP did see her during this episode, noted she had some trouble speaking but was able to follow commands, no seizure-like activity.  Whenever the father arrived he stated that her eyes were open but she was unable to get words out, seemed like she was trying to.  Patient did have a similar episode in 2008, had a full workup with no abnormality noted.  Patient states it happened again a year ago, came to the ER and then went home from there.  Patient states she does have chronic diarrhea, used to have chronic constipation.  Patient states her diarrhea has been since she had a partial colectomy.  Patient used to be on narcotics however these are no longer being taken.      Interval Problem Update  She reports a headache present in the back of her head since the start of symptoms.  At the time of my examination she feels that all of her original symptoms have resolved.  However this morning when she was getting her MRI she did notice that she had to try 8 or 9 times to buckle her watch which normally she does very easily with her right hand.  Patient unbuckled and put her watch back on in front of me without any  difficulty.  She feels she is not speaking normally.    Consultants/Specialty  I discussed the case by telephone with Dr. Matthew Mast.    Disposition  TBD.    Review of Systems  Review of Systems   Constitutional: Negative.  Negative for chills, fever, malaise/fatigue and weight loss.   HENT: Negative.    Respiratory: Negative.  Negative for cough and shortness of breath.    Cardiovascular: Negative.  Negative for chest pain and leg swelling.   Gastrointestinal: Negative.  Negative for abdominal pain, nausea and vomiting.   Genitourinary: Negative.  Negative for dysuria and flank pain.   Musculoskeletal: Negative.  Negative for back pain and myalgias.   Neurological: Positive for sensory change, speech change, focal weakness and headaches. Negative for dizziness, tingling, tremors, seizures, loss of consciousness and weakness.   Endo/Heme/Allergies: Negative.  Does not bruise/bleed easily.   Psychiatric/Behavioral: Negative.  Negative for depression. The patient is not nervous/anxious.    All other systems reviewed and are negative.       Physical Exam  Blood Pressure: 114/84   Temperature: 36.6 °C (97.8 °F)   Pulse: 64   Respiration: 16   Pulse Oximetry: 99 %     Physical Exam   Constitutional: She is oriented to person, place, and time. She appears well-developed and well-nourished. No distress.   Patient seen and examined by me.   HENT:   Nose: Nose normal.   Mouth/Throat: Oropharynx is clear and moist. No oropharyngeal exudate.   Eyes: Conjunctivae are normal. Right eye exhibits no discharge. Left eye exhibits no discharge. No scleral icterus.   Neck: No JVD present. No tracheal deviation present.   Cardiovascular: Normal rate, regular rhythm and normal heart sounds.    Pulmonary/Chest: Effort normal and breath sounds normal. No stridor. No respiratory distress. She has no wheezes. She has no rales. She exhibits no tenderness.   Abdominal: Soft. Bowel sounds are normal. She exhibits distension (slight). There  is no tenderness.   Musculoskeletal: She exhibits no edema or tenderness.   Neurological: She is alert and oriented to person, place, and time. She is not disoriented. She displays no tremor and normal reflexes. No cranial nerve deficit or sensory deficit. She exhibits normal muscle tone. She displays no seizure activity. Coordination and gait normal.   Skin: Skin is warm and dry. She is not diaphoretic. No pallor.   Psychiatric: She has a normal mood and affect. Her behavior is normal.   Nursing note and vitals reviewed.      Fluids    Intake/Output Summary (Last 24 hours) at 09/10/18 1552  Last data filed at 09/10/18 0600   Gross per 24 hour   Intake              870 ml   Output                0 ml   Net              870 ml       Laboratory  Recent Labs      09/09/18   1241  09/10/18   0410   WBC  12.7*  6.8   RBC  4.55  3.80*   HEMOGLOBIN  14.3  11.8*   HEMATOCRIT  42.6  36.2*   MCV  93.6  95.3   MCH  31.4  31.1   MCHC  33.6  32.6*   RDW  44.0  45.0   PLATELETCT  364  266   MPV  9.0  9.2     Recent Labs      09/09/18   1241  09/10/18   0410   SODIUM  134*  138   POTASSIUM  3.5*  4.1   CHLORIDE  108  112   CO2  17*  22   GLUCOSE  75  97   BUN  19  6*   CREATININE  0.79  0.69   CALCIUM  9.6  8.6             Recent Labs      09/10/18   0410   TRIGLYCERIDE  208*   HDL  51   LDL  65       Imaging  MR-BRAIN-W/O   Final Result      1.  No evidence of acute territorial infarct, intracranial hemorrhage or mass lesion.   2.  Findings of sinusitis as described above.      CT-HEAD W/O   Final Result         1. No acute intracranial abnormality. No evidence of acute intracranial hemorrhage or mass lesion.               DX-CHEST-PORTABLE (1 VIEW)   Final Result         1. No acute cardiopulmonary abnormalities are identified.      CT-CTA HEAD WITH & W/O-POST PROCESS    (Results Pending)   CT-CTA NECK WITH & W/O-POST PROCESSING    (Results Pending)        Assessment/Plan  Stroke-like symptoms   Assessment & Plan    MRI of brain  negative for acute findings.  EEG is normal.  Discussed case with neurologist on call for [facility], Dr. Mast, he recommends CTA of brain and neck and hypercoagulable state workup.  Will order labs and CTA, there are mechanical issues that should be fixed by tomorrow; patient agreeable to staying for test.  Suspect possible migraine variant but need to rule out vascular issues.  Fioricet prn for headache.  If any issues are uncovered that require neurology or neurosurgery consult she will have to be transferred to the [facility], patient aware.        Hyponatremia   Assessment & Plan    Resolved with fluids.        Leukocytosis- (present on admission)   Assessment & Plan    -Likely reactive, no additional sign of infection, patient did just have was likely a viral infection but this seems to be improving        Hypothyroidism- (present on admission)   Assessment & Plan    -Continue Synthroid        Anxiety- (present on admission)   Assessment & Plan    -Patient generally takes Xanax at home, will hold this and instead give IV Ativan as needed anxiety, seizure activity

## 2018-09-10 NOTE — PROCEDURES
VIDEO ELECTROENCEPHALOGRAM REPORT        Referring MD: Dr. Garcia.      DOS: 9/10/2018 (total recording of 26 minutes).      INDICATION:  Kathleen Alvarado 38 y.o. female presenting with numbness, weakness, trouble speaking. Rule out seizure.      CURRENT ANTIEPILEPTIC REGIMEN: Lorazepam.      TECHNIQUE: 30 channel video electroencephalogram (EEG) was performed in accordance with the international 10-20 system. The study was reviewed in bipolar and referential montages. The recording examined the patient during wakeful only state.      DESCRIPTION OF THE RECORD:  During the wakefulness, the background showed a symmetrical 12 Hz alpha activity posteriorly with amplitude of 70 mV.  There was reactivity to eye closure/opening.  A normal anterior-posterior gradient was noted with faster beta frequencies seen anteriorly.      ACTIVATION PROCEDURES:   Intermittent Photic stimulation was performed in a stepwise fashion from 1 to 30 Hz and elicited a normal response (photic driving), most noticeable in the posterior leads.        ICTAL AND/OR INTERICTAL FINDINGS:   No focal or generalized epileptiform activity noted. No regional slowing was seen during this routine study.  No clinical events or seizures were reported or recorded during the study.      EKG: sampling of the EKG recording demonstrated sinus rhythm.     EVENTS:  None.      INTERPRETATION:  This is a normal video EEG recording in the awake only state.  Clinical correlation is recommended.     Note: A normal EEG does not rule out epilepsy.  If the clinical suspicion remains high for seizures, a prolonged recording to capture clinical or subclinical events may be helpful.           Matthew Mast MD   Epilepsy and Neurodiagnostics.   Clinical  of Neurology [facility].   Diplomate in Neurology, Epilepsy, and Electrodiagnostic Medicine.   Office: [phone]  Fax: [phone]     [place] / [place]    DD:   09/10/2018 13:50:03  DT:  09/10/2018 15:00:54    D#:  [id number]  Job#:  [id number]

## 2018-09-11 ENCOUNTER — APPOINTMENT (OUTPATIENT)
Dept: RADIOLOGY | Facility: MEDICAL CENTER | Age: 38
End: 2018-09-11
Attending: HOSPITALIST
Payer: COMMERCIAL

## 2018-09-11 VITALS
HEART RATE: 70 BPM | OXYGEN SATURATION: 98 % | BODY MASS INDEX: 23.21 KG/M2 | WEIGHT: 126.1 LBS | HEIGHT: 62 IN | RESPIRATION RATE: 16 BRPM | DIASTOLIC BLOOD PRESSURE: 71 MMHG | SYSTOLIC BLOOD PRESSURE: 117 MMHG | TEMPERATURE: 97.7 F

## 2018-09-11 PROBLEM — E87.1 HYPONATREMIA: Status: RESOLVED | Noted: 2018-09-09 | Resolved: 2018-09-11

## 2018-09-11 PROBLEM — R29.90 STROKE-LIKE SYMPTOMS: Status: RESOLVED | Noted: 2018-09-09 | Resolved: 2018-09-11

## 2018-09-11 PROCEDURE — 70496 CT ANGIOGRAPHY HEAD: CPT

## 2018-09-11 PROCEDURE — A9270 NON-COVERED ITEM OR SERVICE: HCPCS | Performed by: HOSPITALIST

## 2018-09-11 PROCEDURE — 96375 TX/PRO/DX INJ NEW DRUG ADDON: CPT

## 2018-09-11 PROCEDURE — 700102 HCHG RX REV CODE 250 W/ 637 OVERRIDE(OP): Performed by: INTERNAL MEDICINE

## 2018-09-11 PROCEDURE — 700117 HCHG RX CONTRAST REV CODE 255: Performed by: HOSPITALIST

## 2018-09-11 PROCEDURE — 99217 PR OBSERVATION CARE DISCHARGE: CPT | Performed by: HOSPITALIST

## 2018-09-11 PROCEDURE — 700102 HCHG RX REV CODE 250 W/ 637 OVERRIDE(OP): Performed by: HOSPITALIST

## 2018-09-11 PROCEDURE — G0378 HOSPITAL OBSERVATION PER HR: HCPCS

## 2018-09-11 PROCEDURE — 700111 HCHG RX REV CODE 636 W/ 250 OVERRIDE (IP): Performed by: INTERNAL MEDICINE

## 2018-09-11 PROCEDURE — 70498 CT ANGIOGRAPHY NECK: CPT

## 2018-09-11 PROCEDURE — A9270 NON-COVERED ITEM OR SERVICE: HCPCS | Performed by: INTERNAL MEDICINE

## 2018-09-11 RX ORDER — ATORVASTATIN CALCIUM 80 MG/1
80 TABLET, FILM COATED ORAL EVERY EVENING
Qty: 30 TAB | Refills: 0 | Status: SHIPPED | OUTPATIENT
Start: 2018-09-11 | End: 2019-05-03

## 2018-09-11 RX ADMIN — PROCHLORPERAZINE EDISYLATE 10 MG: 5 INJECTION INTRAMUSCULAR; INTRAVENOUS at 07:57

## 2018-09-11 RX ADMIN — ENOXAPARIN SODIUM 40 MG: 100 INJECTION SUBCUTANEOUS at 06:39

## 2018-09-11 RX ADMIN — IOHEXOL 100 ML: 350 INJECTION, SOLUTION INTRAVENOUS at 14:42

## 2018-09-11 RX ADMIN — LEVOTHYROXINE SODIUM 100 MCG: 100 TABLET ORAL at 06:39

## 2018-09-11 RX ADMIN — SENNOSIDES AND DOCUSATE SODIUM 2 TABLET: 8.6; 5 TABLET ORAL at 06:39

## 2018-09-11 RX ADMIN — ASPIRIN 325 MG ORAL TABLET 325 MG: 325 PILL ORAL at 06:39

## 2018-09-11 ASSESSMENT — PAIN SCALES - GENERAL: PAINLEVEL_OUTOF10: 0

## 2018-09-11 ASSESSMENT — PATIENT HEALTH QUESTIONNAIRE - PHQ9
2. FEELING DOWN, DEPRESSED, IRRITABLE, OR HOPELESS: NOT AT ALL
SUM OF ALL RESPONSES TO PHQ9 QUESTIONS 1 AND 2: 0
1. LITTLE INTEREST OR PLEASURE IN DOING THINGS: NOT AT ALL

## 2018-09-11 NOTE — PROGRESS NOTES
Patient received stable, vitals wnl, neuro check q4; blurred vision in the right eye, IV access patent, alert times 4, room air, up self, c/o nausea; addressed via prn, schedule for CTA of head/neck, on tele, full code, allergies noted, skin intact, will continue to monitor.

## 2018-09-11 NOTE — DISCHARGE SUMMARY
Discharge Summary    CHIEF COMPLAINT ON ADMISSION  Chief Complaint   Patient presents with   • Seizure       Reason for Admission  Seizures     Admission Date  9/9/2018    CODE STATUS  Full Code    HPI & HOSPITAL COURSE  38 y.o. female who presented 9/9/2018 with difficulty speaking, right-sided weakness.  Patient states over the last week she has had some upper respiratory symptoms, had significant congestion today.  Patient states she was in [place], states she did not feel right, began having tingling and numbness, especially her right face.  Patient went out to her car and then noted that she was unable to speak.  Prior to this patient was able to call her father who came and brought her to the hospital.  They state this lasted approximately an hour.  Patient had another episode that was much shorter, lasting additional 10 minutes.  ERP did see her during this episode, noted she had some trouble speaking but was able to follow commands, no seizure-like activity.  Whenever the father arrived he stated that her eyes were open but she was unable to get words out, seemed like she was trying to.  Patient did have a similar episode in 2008, had a full workup with no abnormality noted.  Patient states it happened again a year ago, came to the ER and then went home from there.  Patient states she does have chronic diarrhea, used to have chronic constipation.  Patient states her diarrhea has been since she had a partial colectomy.  Patient used to be on narcotics however these are no longer being taken.      She was admitted for further evaluation  HCT was negative, BP normal, minimal white count, hgb stable, CMP normal except Low K which was replaced, A1c neg. CXR negative.   Reports HA at the start of all these symptoms  MRI brain negative.   EEG was normal  Symptoms largely resolved except some blurred vision in right eye  Discussed case with Dr. Mast neurology who recommended CTA head and neck, suspect possible  migraine variant. Patient however did not want to stay for the results of the CTA and has fu with neurology on 9/20 or 9/23. Has PCP sooner. At this time patient medically stable for dc. ER precautions given and patient understood and agreed with the following plan as above      Therefore, she is discharged in fair and stable condition to home with close outpatient follow-up.    The patient recovered much more quickly than anticipated on admission.    Discharge Date  9/11/18    FOLLOW UP ITEMS POST DISCHARGE  pcp    DISCHARGE DIAGNOSES  Active Problems:    Hypothyroidism (Chronic) POA: Yes  Resolved Problems:    Stroke-like symptoms POA: Unknown    Hypokalemia POA: Yes    Anxiety (Chronic) POA: Yes    Leukocytosis POA: Yes    Hyponatremia POA: Unknown      FOLLOW UP  Future Appointments  Date Time Provider Department Center   9/11/2018 6:00 PM [facility]   9/11/2018 6:30 PM [facility] CT 2 [facility] JOELLE Morrell   11/13/2018 1:00 PM [facility] None     BHUPENDRA ChinR.N.  [address] [facility] [address]  [phone]    Go on 9/14/2018  Please arrive at 10:00 am for a regular follow up appointment.      Cathy Kang A.P.R.N.  [address] [facility] [address]  [phone]    Go on 9/20/2018  Please arrive at 11:30 am for your appointment for a hospital follow up visit. You may cancel this appointment if you would like.       MEDICATIONS ON DISCHARGE     Medication List      START taking these medications      Instructions   aspirin EC 81 MG Tbec  Commonly known as:  ECOTRIN   Take 1 Tab by mouth every day.  Dose:  81 mg     atorvastatin 80 MG tablet  Commonly known as:  LIPITOR   Take 1 Tab by mouth every evening.  Dose:  80 mg        CONTINUE taking these medications      Instructions   alprazolam 2 MG tablet  Commonly known as:  XANAX   Take 2 mg by mouth at bedtime as needed for Sleep.  Dose:  2 mg     docusate sodium 100 MG Caps  Commonly known as:  COLACE   Take 100 mg by mouth every  "morning.  Dose:  100 mg     ibuprofen 800 MG Tabs  Commonly known as:  MOTRIN   Take 800 mg by mouth 2 times a day as needed (Pain).  Dose:  800 mg     SYNTHROID 100 MCG Tabs  Generic drug:  levothyroxine   Take 100 mcg by mouth Every morning on an empty stomach. Must have Synthroid- brand name- not levothyroxine  Dose:  100 mcg     VIVELLE-DOT 0.0375 MG/24HR patch  Generic drug:  estradiol   Apply 1 Patch to skin as directed 2X A WEEK. Change every Sunday and Wednesday  Dose:  1 Patch            Allergies  Allergies   Allergen Reactions   • Methylprednisolone      Increased heart rate, into a-fib.   • Morphine Swelling     Swelling; redness   • Tramadol Palpitations     palpitations   • Demerol Rash     Rash   • Dilaudid [Hydromorphone] Hives and Unspecified     \"Makes my entire body tight\"  Hydrocodone     • Latex Itching     Itch, rash   • Other Misc Itching     \"All Metals\" Blisters   • Sulfa Drugs Hives and Rash     Also feel \"high\"   • Tape Rash     Paper tape ok for IVs   • Trazodone Hives     Hives       DIET  Orders Placed This Encounter   Procedures   • Diet Order Regular     Standing Status:   Standing     Number of Occurrences:   1     Order Specific Question:   Diet:     Answer:   Regular [1]       ACTIVITY  As tolerated.  Weight bearing as tolerated    CONSULTATIONS  Neurology over the phone    PROCEDURES  none    LABORATORY  Lab Results   Component Value Date    SODIUM 138 09/10/2018    POTASSIUM 4.1 09/10/2018    CHLORIDE 112 09/10/2018    CO2 22 09/10/2018    GLUCOSE 97 09/10/2018    BUN 6 (L) 09/10/2018    CREATININE 0.69 09/10/2018    CREATININE 1.1 06/16/2008        Lab Results   Component Value Date    WBC 6.8 09/10/2018    HEMOGLOBIN 11.8 (L) 09/10/2018    HEMATOCRIT 36.2 (L) 09/10/2018    PLATELETCT 266 09/10/2018        Total time of the discharge process exceeds 42 minutes.  "

## 2018-09-11 NOTE — CARE PLAN
Problem: Safety  Goal: Will remain free from falls  Outcome: PROGRESSING AS EXPECTED  No falls this shift. No s/s of seizure activity. Ambulated with SBA, gait steady. Calling appropriately for assistance. Falls precautions in place:   bed in lowest and locked position, side rails raised x 2, room near nurses station, call light within reach, nonslip socks on, purposeful hourly rounding.     Problem: Hemodynamic Status  Goal: Stable Vital Signs and Fluid Balance  Outcome: PROGRESSING AS EXPECTED  Hemodynamically stable throughout shift. Neuro checks done q4h, no neurologic deficit noted. Patient reports vision in right eye   remains blurry, but no vision loss noted. NIHSS = 0. HR remains NSR. BP normotensive. SpO2 WNL on RA. Denies any   headache or N/V. MRI negative 9/10. Planned for CTA head and neck in AM. Patient aware.

## 2018-09-11 NOTE — DISCHARGE INSTRUCTIONS
Discharge Instructions    Discharged to home by car with relative. Discharged via walking, hospital escort: Refused.  Special equipment needed: Not Applicable    Be sure to schedule a follow-up appointment with your primary care doctor or any specialists as instructed.     Discharge Plan:   Diet Plan: Discussed  Activity Level: Discussed  Confirmed Follow up Appointment: Appointment Scheduled  Confirmed Symptoms Management: Discussed  Medication Reconciliation Updated: Yes  Influenza Vaccine Indication: Indicated: 9 to 64 years of age  Influenza Vaccine Given - only chart on this line when given: Influenza Vaccine Given (See MAR)    I understand that a diet low in cholesterol, fat, and sodium is recommended for good health. Unless I have been given specific instructions below for another diet, I accept this instruction as my diet prescription.   Other diet: regular    Special Instructions: None    · Is patient discharged on Warfarin / Coumadin?   No     Depression / Suicide Risk    As you are discharged from this [facility] facility, it is important to learn how to keep safe from harming yourself.    Recognize the warning signs:  · Abrupt changes in personality, positive or negative- including increase in energy   · Giving away possessions  · Change in eating patterns- significant weight changes-  positive or negative  · Change in sleeping patterns- unable to sleep or sleeping all the time   · Unwillingness or inability to communicate  · Depression  · Unusual sadness, discouragement and loneliness  · Talk of wanting to die  · Neglect of personal appearance   · Rebelliousness- reckless behavior  · Withdrawal from people/activities they love  · Confusion- inability to concentrate     If you or a loved one observes any of these behaviors or has concerns about self-harm, here's what you can do:  · Talk about it- your feelings and reasons for harming yourself  · Remove any means that you might use to hurt yourself  (examples: pills, rope, extension cords, firearm)  · Get professional help from the community (Mental Health, Substance Abuse, psychological counseling)  · Do not be alone:Call your Safe Contact- someone whom you trust who will be there for you.  · Call your local CRISIS HOTLINE 422-0783 or 040-421-2675  · Call your local Children's Mobile Crisis Response Team [place] (265) 037-7004 or www.Healthify  · Call the toll free National Suicide Prevention Hotlines   · National Suicide Prevention Lifeline 122-233-TXYF (6938)  · National Hope Line Network 800-SUICIDE (177-0357)

## 2018-09-11 NOTE — PROGRESS NOTES
1915: Bedside report received from JERROD Edward. Patient resting comfortably in bed. No questions/concerns/needs verbalized at this time. Falls precautions remain in place.     2020: Rounded on patient, sitting up in bed, watching TV. Alert and oriented x 4. VSS and afebrile. SpO2 WNL on RA. Denies pain/discomfort at this time. IV patent and SL'd. Patient stated she spoke with day shift RN during the day and is refusing IVF At this time. Ordered for 50mL/hr. Patient taking in adequate PO fluids at this time. No vomiting. Neuro check done, no s/s of neurologic deficit noted. NIHSS = 0. Continues to report blurry vision to right eye. Ambulated to bathroom with SBA. Gait steady, no ataxia noted. Plan of care discussed and questions answered. Needs addressed. Falls precautions in place. No further questions or concerns at this time. Will continue to monitor.    ----------------------------------------------------------------------------------------    Telemetry Summary    Rhythm NSR  Rate 65 - 85  Ectopy Rare PVCs  SD 0.16  QRS 0.08  QT 0.36

## 2018-09-12 LAB
FACT VIII ACT/NOR PPP: 143 % (ref 56–191)
NUCLEAR IGG SER QL IA: NORMAL
PROT C ACT/NOR PPP: 170 % (ref 83–168)
PROT S ACT/NOR PPP: 104 % (ref 57–131)
PROT S FREE AG ACT/NOR PPP IA: 92 % (ref 55–123)
VWF AG ACT/NOR PPP IA: 99 % (ref 52–214)
VWF:RCO ACT/NOR PPP PL AGG: 96 % (ref 51–215)

## 2018-09-13 LAB — F5 P.R506Q BLD/T QL: NEGATIVE

## 2018-11-02 ENCOUNTER — TELEPHONE (OUTPATIENT)
Dept: NEUROLOGY | Facility: MEDICAL CENTER | Age: 38
End: 2018-11-02

## 2018-11-02 NOTE — TELEPHONE ENCOUNTER
Called pt and no answer, I left VM letting her know that Bianca reviewed her MRI and it is negative for stroke and that she needs to see any of our seizure doctors instead. I advised pt to call me back.

## 2018-11-09 NOTE — TELEPHONE ENCOUNTER
Called pt and LM saying that we will cancel her appt on 11/13 at 1pm because she didn't have a stroke but a seizure instead. I stated if she needed to make an appt with a neurologist to call our office, I provided phone number.

## 2018-11-13 ENCOUNTER — APPOINTMENT (OUTPATIENT)
Dept: NEUROLOGY | Facility: MEDICAL CENTER | Age: 38
End: 2018-11-13

## 2018-12-15 LAB — EKG IMPRESSION: NORMAL

## 2019-02-19 NOTE — OR SURGEON
Immediate Post OP Note    PreOp Diagnosis:colonic inertia    PostOp Diagnosis: same    Procedure(s):  LOW ANTERIOR RESECTION ROBOTIC XI- FOR SUBTOTAL COLECTOMY W/LOW ANTERIOR ANASTOMOSIS - Wound Class: Clean Contaminated    Surgeon(s):  TONEY Johns M.D.    Anesthesiologist/Type of Anesthesia:  Anesthesiologist: Jamie Desai M.D./General    Surgical Staff:  Circulator: Gilmer Schultz R.N.  Relief Circulator: Jackson Mercado R.N.  Scrub Person: Wong Doss Winston: Rosalino Ramirez R.N.    Specimens:  * No specimens in log *    Estimated Blood Loss: 200    Findings: same    Complications: some fecal contamination         3/14/2018 9:05 PM Tigre Javed    
EOMI; PERRL; no drainage or redness

## 2019-04-19 ENCOUNTER — HOSPITAL ENCOUNTER (OUTPATIENT)
Dept: RADIOLOGY | Facility: MEDICAL CENTER | Age: 39
End: 2019-04-19
Attending: NURSE PRACTITIONER
Payer: COMMERCIAL

## 2019-04-19 DIAGNOSIS — N63.25 BREAST LUMP ON LEFT SIDE AT 3 O'CLOCK POSITION: ICD-10-CM

## 2019-04-19 PROCEDURE — G0279 TOMOSYNTHESIS, MAMMO: HCPCS

## 2019-04-19 PROCEDURE — 76642 ULTRASOUND BREAST LIMITED: CPT | Mod: RT

## 2019-05-03 ENCOUNTER — APPOINTMENT (OUTPATIENT)
Dept: ADMISSIONS | Facility: MEDICAL CENTER | Age: 39
End: 2019-05-03
Attending: SPECIALIST
Payer: COMMERCIAL

## 2019-05-03 DIAGNOSIS — Z01.812 PRE-OPERATIVE LABORATORY EXAMINATION: ICD-10-CM

## 2019-05-03 LAB
ERYTHROCYTE [DISTWIDTH] IN BLOOD BY AUTOMATED COUNT: 42.4 FL (ref 35.9–50)
HCT VFR BLD AUTO: 39.8 % (ref 37–47)
HGB BLD-MCNC: 13.2 G/DL (ref 12–16)
MCH RBC QN AUTO: 32.2 PG (ref 27–33)
MCHC RBC AUTO-ENTMCNC: 33.2 G/DL (ref 33.6–35)
MCV RBC AUTO: 97.1 FL (ref 81.4–97.8)
PLATELET # BLD AUTO: 312 K/UL (ref 164–446)
PMV BLD AUTO: 9.6 FL (ref 9–12.9)
RBC # BLD AUTO: 4.1 M/UL (ref 4.2–5.4)
WBC # BLD AUTO: 7.5 K/UL (ref 4.8–10.8)

## 2019-05-03 PROCEDURE — 85027 COMPLETE CBC AUTOMATED: CPT

## 2019-05-03 PROCEDURE — 36415 COLL VENOUS BLD VENIPUNCTURE: CPT

## 2019-05-03 NOTE — OR NURSING
Pre admit apt: Pt. Instructed to continue regularly prescribed medications through day before surgery.  Instructed to take the following medications, the day of surgery, with a sip of water per anesthesia protocol: levothyroxine

## 2019-05-06 ENCOUNTER — HOSPITAL ENCOUNTER (OUTPATIENT)
Dept: LAB | Facility: MEDICAL CENTER | Age: 39
End: 2019-05-06
Attending: OTOLARYNGOLOGY
Payer: COMMERCIAL

## 2019-05-06 PROCEDURE — 87075 CULTR BACTERIA EXCEPT BLOOD: CPT

## 2019-05-06 PROCEDURE — 87205 SMEAR GRAM STAIN: CPT

## 2019-05-06 PROCEDURE — 87070 CULTURE OTHR SPECIMN AEROBIC: CPT

## 2019-05-07 LAB
AMBIGUOUS DTTM AMBI4: NORMAL
GRAM STN SPEC: NORMAL
SIGNIFICANT IND 70042: NORMAL
SIGNIFICANT IND 70042: NORMAL
SITE SITE: NORMAL
SITE SITE: NORMAL
SOURCE SOURCE: NORMAL
SOURCE SOURCE: NORMAL

## 2019-05-09 LAB
BACTERIA WND AEROBE CULT: NORMAL
GRAM STN SPEC: NORMAL
SIGNIFICANT IND 70042: NORMAL
SITE SITE: NORMAL
SOURCE SOURCE: NORMAL

## 2019-05-09 NOTE — OR NURSING
New lab found with H&P.Verified with office that it is OK to do DOS since PAT appt completed on 5/3.

## 2019-05-10 LAB
BACTERIA SPEC ANAEROBE CULT: NORMAL
SIGNIFICANT IND 70042: NORMAL
SITE SITE: NORMAL
SOURCE SOURCE: NORMAL

## 2019-05-13 ENCOUNTER — HOSPITAL ENCOUNTER (OUTPATIENT)
Facility: MEDICAL CENTER | Age: 39
End: 2019-05-13
Attending: SPECIALIST | Admitting: SPECIALIST
Payer: COMMERCIAL

## 2019-05-13 ENCOUNTER — ANESTHESIA (OUTPATIENT)
Dept: SURGERY | Facility: MEDICAL CENTER | Age: 39
End: 2019-05-13
Payer: COMMERCIAL

## 2019-05-13 ENCOUNTER — ANESTHESIA EVENT (OUTPATIENT)
Dept: SURGERY | Facility: MEDICAL CENTER | Age: 39
End: 2019-05-13
Payer: COMMERCIAL

## 2019-05-13 VITALS
SYSTOLIC BLOOD PRESSURE: 117 MMHG | DIASTOLIC BLOOD PRESSURE: 74 MMHG | TEMPERATURE: 97.2 F | RESPIRATION RATE: 16 BRPM | HEIGHT: 62 IN | BODY MASS INDEX: 23.25 KG/M2 | OXYGEN SATURATION: 100 % | WEIGHT: 126.32 LBS

## 2019-05-13 LAB — TESTOST SERPL-MCNC: 41 NG/DL (ref 9–75)

## 2019-05-13 PROCEDURE — 502575 HCHG PACK, BREAST: Performed by: SPECIALIST

## 2019-05-13 PROCEDURE — A9270 NON-COVERED ITEM OR SERVICE: HCPCS | Performed by: ANESTHESIOLOGY

## 2019-05-13 PROCEDURE — 700102 HCHG RX REV CODE 250 W/ 637 OVERRIDE(OP): Performed by: SPECIALIST

## 2019-05-13 PROCEDURE — 160028 HCHG SURGERY MINUTES - 1ST 30 MINS LEVEL 3: Performed by: SPECIALIST

## 2019-05-13 PROCEDURE — 700101 HCHG RX REV CODE 250: Performed by: ANESTHESIOLOGY

## 2019-05-13 PROCEDURE — 700105 HCHG RX REV CODE 258: Performed by: SPECIALIST

## 2019-05-13 PROCEDURE — A6222 GAUZE <=16 IN NO W/SAL W/O B: HCPCS | Performed by: SPECIALIST

## 2019-05-13 PROCEDURE — 160009 HCHG ANES TIME/MIN: Performed by: SPECIALIST

## 2019-05-13 PROCEDURE — A6223 GAUZE >16<=48 NO W/SAL W/O B: HCPCS | Performed by: SPECIALIST

## 2019-05-13 PROCEDURE — 160046 HCHG PACU - 1ST 60 MINS PHASE II: Performed by: SPECIALIST

## 2019-05-13 PROCEDURE — 700101 HCHG RX REV CODE 250: Performed by: SPECIALIST

## 2019-05-13 PROCEDURE — 700111 HCHG RX REV CODE 636 W/ 250 OVERRIDE (IP): Performed by: ANESTHESIOLOGY

## 2019-05-13 PROCEDURE — 160035 HCHG PACU - 1ST 60 MINS PHASE I: Performed by: SPECIALIST

## 2019-05-13 PROCEDURE — 501838 HCHG SUTURE GENERAL: Performed by: SPECIALIST

## 2019-05-13 PROCEDURE — 84403 ASSAY OF TOTAL TESTOSTERONE: CPT

## 2019-05-13 PROCEDURE — 700102 HCHG RX REV CODE 250 W/ 637 OVERRIDE(OP): Performed by: ANESTHESIOLOGY

## 2019-05-13 PROCEDURE — 160025 RECOVERY II MINUTES (STATS): Performed by: SPECIALIST

## 2019-05-13 PROCEDURE — 160048 HCHG OR STATISTICAL LEVEL 1-5: Performed by: SPECIALIST

## 2019-05-13 PROCEDURE — 160039 HCHG SURGERY MINUTES - EA ADDL 1 MIN LEVEL 3: Performed by: SPECIALIST

## 2019-05-13 PROCEDURE — 160002 HCHG RECOVERY MINUTES (STAT): Performed by: SPECIALIST

## 2019-05-13 PROCEDURE — A9270 NON-COVERED ITEM OR SERVICE: HCPCS | Performed by: SPECIALIST

## 2019-05-13 PROCEDURE — A6403 STERILE GAUZE>16 <= 48 SQ IN: HCPCS | Performed by: SPECIALIST

## 2019-05-13 PROCEDURE — 160036 HCHG PACU - EA ADDL 30 MINS PHASE I: Performed by: SPECIALIST

## 2019-05-13 PROCEDURE — 500375 HCHG DRAIN, J-P ROUND 10FR: Performed by: SPECIALIST

## 2019-05-13 DEVICE — IMPLANTABLE DEVICE: Type: IMPLANTABLE DEVICE | Site: BREAST | Status: FUNCTIONAL

## 2019-05-13 RX ORDER — OXYCODONE HCL 5 MG/5 ML
5 SOLUTION, ORAL ORAL
Status: COMPLETED | OUTPATIENT
Start: 2019-05-13 | End: 2019-05-13

## 2019-05-13 RX ORDER — HALOPERIDOL 5 MG/ML
1 INJECTION INTRAMUSCULAR
Status: DISCONTINUED | OUTPATIENT
Start: 2019-05-13 | End: 2019-05-14 | Stop reason: HOSPADM

## 2019-05-13 RX ORDER — MIDAZOLAM HYDROCHLORIDE 1 MG/ML
1 INJECTION INTRAMUSCULAR; INTRAVENOUS
Status: DISCONTINUED | OUTPATIENT
Start: 2019-05-13 | End: 2019-05-14 | Stop reason: HOSPADM

## 2019-05-13 RX ORDER — BACITRACIN 50000 [IU]/1
INJECTION, POWDER, FOR SOLUTION INTRAMUSCULAR
Status: DISCONTINUED | OUTPATIENT
Start: 2019-05-13 | End: 2019-05-13 | Stop reason: HOSPADM

## 2019-05-13 RX ORDER — HALOPERIDOL 5 MG/ML
INJECTION INTRAMUSCULAR PRN
Status: DISCONTINUED | OUTPATIENT
Start: 2019-05-13 | End: 2019-05-13 | Stop reason: SURG

## 2019-05-13 RX ORDER — DEXAMETHASONE SODIUM PHOSPHATE 4 MG/ML
INJECTION, SOLUTION INTRA-ARTICULAR; INTRALESIONAL; INTRAMUSCULAR; INTRAVENOUS; SOFT TISSUE PRN
Status: DISCONTINUED | OUTPATIENT
Start: 2019-05-13 | End: 2019-05-13 | Stop reason: SURG

## 2019-05-13 RX ORDER — ONDANSETRON 2 MG/ML
4 INJECTION INTRAMUSCULAR; INTRAVENOUS
Status: DISCONTINUED | OUTPATIENT
Start: 2019-05-13 | End: 2019-05-14 | Stop reason: HOSPADM

## 2019-05-13 RX ORDER — SODIUM CHLORIDE, SODIUM LACTATE, POTASSIUM CHLORIDE, CALCIUM CHLORIDE 600; 310; 30; 20 MG/100ML; MG/100ML; MG/100ML; MG/100ML
INJECTION, SOLUTION INTRAVENOUS CONTINUOUS
Status: DISCONTINUED | OUTPATIENT
Start: 2019-05-13 | End: 2019-05-14 | Stop reason: HOSPADM

## 2019-05-13 RX ORDER — CEFAZOLIN SODIUM 1 G/3ML
INJECTION, POWDER, FOR SOLUTION INTRAMUSCULAR; INTRAVENOUS
Status: DISCONTINUED | OUTPATIENT
Start: 2019-05-13 | End: 2019-05-13 | Stop reason: HOSPADM

## 2019-05-13 RX ORDER — METOCLOPRAMIDE 10 MG/1
10 TABLET ORAL 3 TIMES DAILY
Refills: 5 | COMMUNITY
Start: 2019-04-29 | End: 2019-07-22

## 2019-05-13 RX ORDER — OXYCODONE HCL 5 MG/5 ML
10 SOLUTION, ORAL ORAL
Status: COMPLETED | OUTPATIENT
Start: 2019-05-13 | End: 2019-05-13

## 2019-05-13 RX ORDER — ONDANSETRON 2 MG/ML
INJECTION INTRAMUSCULAR; INTRAVENOUS PRN
Status: DISCONTINUED | OUTPATIENT
Start: 2019-05-13 | End: 2019-05-13 | Stop reason: SURG

## 2019-05-13 RX ORDER — MAGNESIUM SULFATE HEPTAHYDRATE 40 MG/ML
INJECTION, SOLUTION INTRAVENOUS PRN
Status: DISCONTINUED | OUTPATIENT
Start: 2019-05-13 | End: 2019-05-13 | Stop reason: SURG

## 2019-05-13 RX ORDER — FLUCONAZOLE 150 MG/1
150 TABLET ORAL 2 TIMES DAILY
Refills: 3 | COMMUNITY
Start: 2019-04-16 | End: 2019-07-22

## 2019-05-13 RX ORDER — DIPHENHYDRAMINE HYDROCHLORIDE 50 MG/ML
12.5 INJECTION INTRAMUSCULAR; INTRAVENOUS
Status: DISCONTINUED | OUTPATIENT
Start: 2019-05-13 | End: 2019-05-14 | Stop reason: HOSPADM

## 2019-05-13 RX ORDER — GENTAMICIN SULFATE 40 MG/ML
INJECTION, SOLUTION INTRAMUSCULAR; INTRAVENOUS
Status: DISCONTINUED | OUTPATIENT
Start: 2019-05-13 | End: 2019-05-13 | Stop reason: HOSPADM

## 2019-05-13 RX ORDER — LABETALOL HYDROCHLORIDE 5 MG/ML
5 INJECTION, SOLUTION INTRAVENOUS
Status: DISCONTINUED | OUTPATIENT
Start: 2019-05-13 | End: 2019-05-14 | Stop reason: HOSPADM

## 2019-05-13 RX ORDER — CEFAZOLIN SODIUM 1 G/3ML
INJECTION, POWDER, FOR SOLUTION INTRAMUSCULAR; INTRAVENOUS PRN
Status: DISCONTINUED | OUTPATIENT
Start: 2019-05-13 | End: 2019-05-13 | Stop reason: SURG

## 2019-05-13 RX ORDER — LIDOCAINE HYDROCHLORIDE AND EPINEPHRINE 5; 5 MG/ML; UG/ML
INJECTION, SOLUTION INFILTRATION; PERINEURAL
Status: DISCONTINUED | OUTPATIENT
Start: 2019-05-13 | End: 2019-05-13 | Stop reason: HOSPADM

## 2019-05-13 RX ORDER — SCOLOPAMINE TRANSDERMAL SYSTEM 1 MG/1
1 PATCH, EXTENDED RELEASE TRANSDERMAL
Status: DISCONTINUED | OUTPATIENT
Start: 2019-05-13 | End: 2019-05-14 | Stop reason: HOSPADM

## 2019-05-13 RX ORDER — SODIUM CHLORIDE, SODIUM LACTATE, POTASSIUM CHLORIDE, CALCIUM CHLORIDE 600; 310; 30; 20 MG/100ML; MG/100ML; MG/100ML; MG/100ML
INJECTION, SOLUTION INTRAVENOUS CONTINUOUS
Status: ACTIVE | OUTPATIENT
Start: 2019-05-13 | End: 2019-05-13

## 2019-05-13 RX ADMIN — FENTANYL CITRATE 50 MCG: 50 INJECTION INTRAMUSCULAR; INTRAVENOUS at 11:57

## 2019-05-13 RX ADMIN — OXYCODONE HYDROCHLORIDE 10 MG: 5 SOLUTION ORAL at 12:02

## 2019-05-13 RX ADMIN — LIDOCAINE HYDROCHLORIDE 100 MG: 20 INJECTION, SOLUTION INFILTRATION; PERINEURAL at 09:03

## 2019-05-13 RX ADMIN — MIDAZOLAM HYDROCHLORIDE 2 MG: 1 INJECTION, SOLUTION INTRAMUSCULAR; INTRAVENOUS at 08:59

## 2019-05-13 RX ADMIN — FENTANYL CITRATE 50 MCG: 50 INJECTION, SOLUTION INTRAMUSCULAR; INTRAVENOUS at 10:45

## 2019-05-13 RX ADMIN — CEFAZOLIN 2 G: 1 INJECTION, POWDER, FOR SOLUTION INTRAVENOUS at 09:04

## 2019-05-13 RX ADMIN — ONDANSETRON 4 MG: 2 INJECTION INTRAMUSCULAR; INTRAVENOUS at 09:03

## 2019-05-13 RX ADMIN — DEXAMETHASONE SODIUM PHOSPHATE 8 MG: 4 INJECTION, SOLUTION INTRAMUSCULAR; INTRAVENOUS at 09:04

## 2019-05-13 RX ADMIN — PROPOFOL 150 MG: 10 INJECTION, EMULSION INTRAVENOUS at 09:03

## 2019-05-13 RX ADMIN — SODIUM CHLORIDE, POTASSIUM CHLORIDE, SODIUM LACTATE AND CALCIUM CHLORIDE: 600; 310; 30; 20 INJECTION, SOLUTION INTRAVENOUS at 08:14

## 2019-05-13 RX ADMIN — FENTANYL CITRATE 50 MCG: 50 INJECTION, SOLUTION INTRAMUSCULAR; INTRAVENOUS at 09:20

## 2019-05-13 RX ADMIN — FENTANYL CITRATE 50 MCG: 50 INJECTION, SOLUTION INTRAMUSCULAR; INTRAVENOUS at 10:21

## 2019-05-13 RX ADMIN — LIDOCAINE HYDROCHLORIDE 0.5 ML: 10 INJECTION, SOLUTION INFILTRATION; PERINEURAL at 08:14

## 2019-05-13 RX ADMIN — SCOPALAMINE 1 PATCH: 1 PATCH, EXTENDED RELEASE TRANSDERMAL at 08:14

## 2019-05-13 RX ADMIN — MAGNESIUM SULFATE IN WATER 2 G: 40 INJECTION, SOLUTION INTRAVENOUS at 09:03

## 2019-05-13 RX ADMIN — SODIUM CHLORIDE, POTASSIUM CHLORIDE, SODIUM LACTATE AND CALCIUM CHLORIDE: 600; 310; 30; 20 INJECTION, SOLUTION INTRAVENOUS at 08:59

## 2019-05-13 RX ADMIN — HALOPERIDOL LACTATE 1 MG: 5 INJECTION, SOLUTION INTRAMUSCULAR at 09:04

## 2019-05-13 RX ADMIN — FENTANYL CITRATE 50 MCG: 50 INJECTION INTRAMUSCULAR; INTRAVENOUS at 11:44

## 2019-05-13 ASSESSMENT — PAIN SCALES - GENERAL: PAIN_LEVEL: 0

## 2019-05-13 NOTE — ANESTHESIA PREPROCEDURE EVALUATION
39yoF with PMHx of hypothyroidism, chronic back pain on chronic opiates, presenting for bilateral mastopexy, implant exchange    Allergies to clindamycin, methylprednisolone, morphine, norco, tramadol, demerol, dilaudid, latex, sulfa, trazadone, tape, metals    Appropriately NPO  Took levothyroxine this am  Relevant Problems   (+) Hypothyroidism       Physical Exam    Airway   Mallampati: II  TM distance: >3 FB  Neck ROM: full       Cardiovascular - normal exam     Dental - normal exam         Pulmonary - normal exam     Abdominal    Neurological - normal exam                 Anesthesia Plan    ASA 2       Plan - general       Airway plan will be LMA        Induction: intravenous    Postoperative Plan: Postoperative administration of opioids is intended.    Pertinent diagnostic labs and testing reviewed    Informed Consent:    Anesthetic plan and risks discussed with patient.

## 2019-05-13 NOTE — OR NURSING
1123- Patient arrived from OR to PACU via gurney. LMA in place; on room air. Surgical dressings c/d/i. PANCHO drain to right upper chest. SCDs and True hose in place. Temp 35.8, patient placed on warmer.  1129- LMA dc'd. O2 sat stable on room air. Patient drowsy.  1135- Patient alert and oriented. Pain level 7/10.   1144-PRN IV Fentanyl administered. PO fluids given. Patient able to rest quietly.   1150- Temp increasing with warmer in place.  1156- Pain increased to 9/10. PRN IV Fentanyl administered.  1202- Oral pain medication administered.  1215- Patient resting quietly. Pain level decreased to 6/10. Patient states pain level is tolerable.  1235- Vital signs stable.  1239-IV saline locked.  1244- Patient meets criteria to discharge to Stage 2. PANCHO drain with minimal drainage; not emptied at this time.

## 2019-05-13 NOTE — OR NURSING
0737: Brought patient back to pre-op and assumed care.  0823: Patient allergies and NPO status verified, home medication reconciliation completed and belongings secured. Patient verbalizes understanding of pain scale, expected course of stay and plan of care. Surgical site verified with patient. IV access established. Sequentials placed on legs.

## 2019-05-13 NOTE — ANESTHESIA TIME REPORT
Anesthesia Start and Stop Event Times     Date Time Event    5/13/2019 0859 Anesthesia Start     1130 Anesthesia Stop        Responsible Staff  05/13/19    Name Role Begin End    Taylor Womack M.D. Anesth 0859 1130        Preop Diagnosis (Free Text):  Pre-op Diagnosis     ruptured silicone breast implant/displacement        Preop Diagnosis (Codes):  Diagnosis Information     Diagnosis Code(s):         Post op Diagnosis  Ruptured silicone breast implant      Premium Reason  Non-Premium    Comments:

## 2019-05-13 NOTE — DISCHARGE INSTRUCTIONS
ACTIVITY: Rest and take it easy for the first 24 hours.  A responsible adult is recommended to remain with you during that time.  It is normal to feel sleepy.  We encourage you to not do anything that requires balance, judgment or coordination.    MILD FLU-LIKE SYMPTOMS ARE NORMAL. YOU MAY EXPERIENCE GENERALIZED MUSCLE ACHES, THROAT IRRITATION, HEADACHE AND/OR SOME NAUSEA.    FOR 24 HOURS DO NOT:  Drive, operate machinery or run household appliances.  Drink beer or alcoholic beverages.   Make important decisions or sign legal documents.    SPECIAL INSTRUCTIONS:   Sleep/rest with head elevated at least 30 degrees (ie well propped up with pillows in bed or in recliner chair).  Remove Scopolamine patch from behind your ear on Thursday morning - wash hands thoroughly immediately afterward and avoid touching your eyes after touching the medication patch when removing.  Use incentive spirometer as instructed at least 10x/hour while awake.    DIET: To avoid nausea, slowly advance diet as tolerated, avoiding spicy or greasy foods for the first day.  Add more substantial food to your diet according to your physician's instructions.  INCREASE FLUIDS AND FIBER TO AVOID CONSTIPATION.    SURGICAL DRESSING/BATHING:   Keep dressing clean, dry and intact until instructed otherwise by surgeon.   DO NOT SHOWER  Strip, empty and record volume of your drain as instructed.     FOLLOW-UP APPOINTMENT:  A follow-up appointment should be arranged with your doctor tomorrow; call to schedule.    You should CALL YOUR PHYSICIAN if you develop:  Fever greater than 101 degrees F.  Pain not relieved by medication, or persistent nausea or vomiting.  Excessive bleeding (blood soaking through dressing) or unexpected drainage from the wound.  Extreme redness or swelling around the incision site, drainage of pus or foul smelling drainage.  Inability to urinate or empty your bladder within 8 hours.  Problems with breathing or chest pain.    You should  call [phone] if you develop problems with breathing or chest pain.  If you are unable to contact your doctor or surgical center, you should go to the nearest emergency room or urgent care center.  Physician's telephone #: [phone]    If any questions arise, call your doctor.  If your doctor is not available, please feel free to call the Surgical Center at [phone].  The Center is open Monday through Friday from 7AM to 7PM.  You can also call the HEALTH HOTLINE open 24 hours/day, 7 days/week and speak to a nurse at [phone], or toll free at [phone].    A registered nurse may call you a few days after your surgery to see how you are doing after your procedure.    MEDICATIONS: Resume taking daily medication.  Take prescribed pain medication with food.  If no medication is prescribed, you may take non-aspirin pain medication if needed.  PAIN MEDICATION CAN BE VERY CONSTIPATING.  Take a stool softener or laxative such as senokot, pericolace, or milk of magnesia if needed.    Prescription given pre-operatively.  Last pain medication given at 12:02 p.m. (10 mg Oxycodone)    If your physician has prescribed pain medication that includes Acetaminophen (Tylenol), do not take additional Acetaminophen (Tylenol) while taking the prescribed medication.    Depression / Suicide Risk    As you are discharged from this [facility] facility, it is important to learn how to keep safe from harming yourself.    Recognize the warning signs:  · Abrupt changes in personality, positive or negative- including increase in energy   · Giving away possessions  · Change in eating patterns- significant weight changes-  positive or negative  · Change in sleeping patterns- unable to sleep or sleeping all the time   · Unwillingness or inability to communicate  · Depression  · Unusual sadness, discouragement and loneliness  · Talk of wanting to die  · Neglect of personal appearance   · Rebelliousness- reckless behavior  · Withdrawal  from people/activities they love  · Confusion- inability to concentrate     If you or a loved one observes any of these behaviors or has concerns about self-harm, here's what you can do:  · Talk about it- your feelings and reasons for harming yourself  · Remove any means that you might use to hurt yourself (examples: pills, rope, extension cords, firearm)  · Get professional help from the community (Mental Health, Substance Abuse, psychological counseling)  · Do not be alone:Call your Safe Contact- someone whom you trust who will be there for you.  · Call your local CRISIS HOTLINE 978-3790 or 929-465-4125  · Call your local Children's Mobile Crisis Response Team [place] (193) 504-9795 or www.[place]  · Call the toll free National Suicide Prevention Hotlines   · National Suicide Prevention Lifeline 008-937-ENJN (0973)  · National Hope Line Network 800-SUICIDE (286-6473)

## 2019-05-13 NOTE — ANESTHESIA POSTPROCEDURE EVALUATION
Patient: Kathleen Alvarado    Procedure Summary     Date:  05/13/19 Room / Location:   OR 03 / SURGERY [facility]    Anesthesia Start:  0859 Anesthesia Stop:  1130    Procedures:       INSERTION, IMPLANT, BREAST - EXCHANGE SILICONE (Bilateral Breast)      MASTOPEXY - LOWER W/REPOSITIONING OF LATERAL FOLDS (Bilateral Breast)      CAPSULECTOMY (Right Breast) Diagnosis:  (ruptured silicone breast implant/displacement)    Surgeon:  gB Dumont M.D. Responsible Provider:  Taylor Womack M.D.    Anesthesia Type:  general ASA Status:  2          Final Anesthesia Type: general  Last vitals  BP   Blood Pressure: (P) 129/79    Temp   (!) (P) 35.8 °C (96.4 °F)    Pulse   Heart Rate (Monitored): (P) 68   Resp   (P) 16    SpO2   (P) 100 %      Anesthesia Post Evaluation    Patient location during evaluation: PACU  Patient participation: complete - patient participated  Level of consciousness: awake and alert  Pain score: 0    Airway patency: patent  Anesthetic complications: no  Cardiovascular status: adequate and hemodynamically stable  Respiratory status: acceptable  Hydration status: acceptable    PONV: none           Nurse Pain Score: 0 (NPRS)

## 2019-05-13 NOTE — OR SURGEON
Immediate Post OP Note    PreOp Diagnosis: Ruptured right silicone breast implant with capsular contracture, lateral displacement and lower ptosis    PostOp Diagnosis: Same    Procedure(s):  INSERTION, IMPLANT, BREAST - EXCHANGE SILICONE  MASTOPEXY - LOWER W/REPOSITIONING OF LATERAL FOLDS  CAPSULECTOMY    Surgeon(s):  Bg Dumont M.D.    Anesthesiologist/Type of Anesthesia:  Anesthesiologist: Taylor Womack M.D./General    Surgical Staff:  Circulator: Samir Christensen R.N.; Leonila Lees R.N.  Scrub Person: [facility] Non Clinical Card Builder    Specimens removed if any:  * No specimens in log *    Estimated Blood Loss: 50 cc    Findings: See dictation    Complications: None        5/13/2019 11:16 AM Bg Dumont M.D.

## 2019-05-13 NOTE — OR NURSING
1250 Pt in restroom, 150cc's emptied out of PANCHO drain. Pt able to void moderate amount. IS instructions discussed and PANCHO care with return demonstrations. Discharge instructions discussed and all questions answered. Stressed the importance of removing scop. patch if nausea free in less then 72 hours.  1305 Eating cracker, no nausea, pain tolerable. 1330 No assessment changes, pt discharged via w/c at this time.

## 2019-05-13 NOTE — ANESTHESIA QCDR
2019 [facility] Clinical Data Registry (for Quality Improvement)     Postoperative nausea/vomiting risk protocol (Adult = 18 yrs and Pediatric 3-17 yrs)- (430 and 463)  General inhalation anesthetic (NOT TIVA) with PONV risk factors: Yes  Provision of anti-emetic therapy with at least 2 different classes of agents: Yes   Patient DID NOT receive anti-emetic therapy and reason is documented in Medical Record:  N/A    Multimodal Pain Management- (AQI59)  Patient undergoing Elective Surgery (i.e. Outpatient, or ASC, or Prescheduled Surgery prior to Hospital Admission): Yes  Use of Multimodal Pain Management, two or more drugs and/or interventions, NOT including systemic opioids: Yes   Exception: Documented allergy to multiple classes of analgesics:  N/A    PACU assessment of acute postoperative pain prior to Anesthesia Care End- Applies to Patients Age = 18- (ABG7)  Initial PACU pain score is which of the following: < 7/10  Patient unable to report pain score: N/A    Post-anesthetic transfer of care checklist/protocol to PACU/ICU- (426 and 427)  Upon conclusion of case, patient transferred to which of the following locations: PACU/Non-ICU  Use of transfer checklist/protocol: Yes  Exclusion: Service Performed in Patient Hospital Room (and thus did not require transfer): N/A    PACU Reintubation- (AQI31)  General anesthesia requiring endotracheal intubation (ETT) along with subsequent extubation in OR or PACU: Yes  Required reintubation in the PACU: No   Extubation was a planned trial documented in the medical record prior to removal of the original airway device:  N/A    Unplanned admission to ICU related to anesthesia service up through end of PACU care- (MD51)  Unplanned admission to ICU (not initially anticipated at anesthesia start time): No

## 2019-05-13 NOTE — ANESTHESIA PROCEDURE NOTES
Airway  Date/Time: 5/13/2019 9:04 AM  Performed by: SAMINA REINOSO  Authorized by: SAMINA REINOSO     Location:  OR  Urgency:  Elective  Difficult Airway: No    Indications for Airway Management:  Anesthesia  Spontaneous Ventilation: absent    Sedation Level:  Deep  Preoxygenated: Yes    Patient Position:  Sniffing  Mask Difficulty Assessment:  1 - vent by mask  Final Airway Type:  Supraglottic airway  Final Supraglottic Airway:  Standard LMA  SGA Size:  3  Number of Attempts at Approach:  1

## 2019-05-13 NOTE — OP REPORT
DATE OF SERVICE:  05/13/2019    SERVICE:  Plastic surgery    SURGEON:  Gian Dumont MD    ANESTHESIOLOGIST:  Taylor Womack MD    ANESTHESIA:  General.    PREOPERATIVE DIAGNOSES:  Ruptured right silicone breast implant with capsular   contracture and bilateral lateral displacement of breast implants with lower   mammary ptosis and bottoming out deformity, left worse than right.    POSTOPERATIVE DIAGNOSES:  Ruptured right silicone breast implant with capsular   contracture and bilateral lateral displacement of breast implants with lower   mammary ptosis and bottoming out deformity, left worse than right.    OPERATIVE PROCEDURES:  Bilateral exchange of silicone breast implants with   right capsulectomy and bilateral repositioning of lateral folds with lower   mastopexies.    INDICATIONS:  A 39-year-old female who has had previous breast augmentation   with mammoplasty and vertical mastopexies using Allergan style 15 smooth round   moderate profile 457 mL silicone breast implants placed in both breasts in   the partial submuscular position on 10/11/2011.  In the last few months, the   patient was noted to have a right silicone breast implant rupture on imaging   and a grade III capsular contracture that worsened over the last few months.    The left implant is grossly intact.  The patient has also developed left worse   than right lower ptosis with bottoming out and lateral displacement.  The   patient will undergo these procedures under general anesthesia as an   outpatient and Allergan warranty has been initiated for the rupture.  She   wishes to use the Allergan Natrelle SSM-445 on the larger left breast and the   SSM-485 on the smaller right breast.  The patient understands the risks,   benefits, and alternatives including but not limited to the risks of bleeding,   hematoma, seroma, infection, wound dehiscence, painful or unsightly scarring,   hypertrophic or keloid scarring, painful neuroma, sensory loss  or decrease,   nipple sensory loss or decrease, nipple necrosis, nipple malposition,   asymmetry or irregularity; skin, fat or breast necrosis; benign or malignant   breast disease, injury to muscle, injury to ribs, pneumothorax, DVT, pulmonary   embolism, fat embolism, implant infection, capsular contracture, deflation,   rupture or leakage, extrusion or exposure, complications related to silicone,   silicone granulomas, silicone lymphadenitis, silicone extrusion, migration,   palpability or visibility, ALCL, implants that are too large or too small,   implant firmness, tenderness, rippling, asymmetry or irregularity, chronic   breast or chest pain, lymphedema, pigmentation changes, stretch marks, ptosis,   telangiectasias, bottoming out, lateral displacement, poor definition of   cleavage or upper pole, complications related to lateral fold repositioning   sutures, asymmetry or irregularity of the inframammary folds, changes with   weight gain or loss; changes with aging, medications, health condition,   trauma, infection, sun exposure; cardiovascular or cardiorespiratory   compromise, aspiration pneumonitis, hemorrhage, need for transfusion,   complications related to general anesthesia, complications related to drain,   complications related to sutures, Steri-Strips, Xeroform and dressings,   complications related to Ace wrap, mortality, and need for future revision.    The patient is motivated and signed informed consent.    OPERATIVE REPORT:  The patient was marked preoperatively in sitting position   indicating midline as well as inframammary fold and the level of second rib.    The left breast appears larger than the right and they both have lateral   displacement where the right breast has more oval shape and has a documented   ruptured silicone breast implant.  She has a grade III capsular contracture on   the right and it is grade I-grade II on the left.  Poor definition of   cleavage on the right, which  is normal on the left.  Finally, she has left   worse than right bottoming out with lower ptosis and circumareolar as well as   vertical and some transverse scars on the breasts.  The patient was also   marked for repositioning of lateral folds as well as the lower mastopexy where   excess skin will be removed in a transverse ellipse about 5 cm below the   lower aspect of nipple-areolar complex.  The position of the bilateral   nipple-areolar complexes are very symmetrical and do not have to be raised.    Again, the patient confirmed the size of breast implants.  The patient was   then taken to the OR where she was prepped and draped in supine position under   general anesthesia.  Sequential stockings were placed.  Starting with the   right breast, it was infiltrated with 0.5% Xylocaine with 1:200,000 dilution   epinephrine.  A transverse incision was made at the inframammary fold and   above that at the marking for the lower mastopexy, about 5 cm below the lower   aspect of the right nipple-areolar complex as an ellipse.  Deepithelialization   was performed with a 15 blade and needle tip electrocautery.  Hemostasis was   secured using electrocautery.  At this point, a 5 cm transverse incision was   made in the mid lower right breast subcutaneous fat and breast tissue with   needle tip electrocautery down to the grade III capsular contracture.  A   complete capsulectomy was performed anteriorly, medially, laterally, and   superiorly as well as posteriorly.  Once it was noted that there was leakage   of silicone, the capsule was opened and the ruptured silicone breast implant   shell as well as its contents were removed and were somewhat yellow in color   indicating a chronic rupture.  This was then sent to be sterilized and   returned to [facility] for warranty evaluation.  Complete capsulectomy was   performed and the wound was irrigated with a liter of triple antibiotic saline   solution.  Hemostasis secured using  electrocautery and then the wound was   packed with triple antibiotic saline soaked lap sponges.  Gloves and   instruments were changed and the left breast was then operated on after   infiltration with 0.5% Xylocaine with 1:200,000 dilution epinephrine.  A   similar lower mastopexy incision was made with an incision at the inframammary   fold followed by a transverse incision about 5 cm below the left   nipple-areolar complex completing the ellipse, which was then deepithelialized   with a 15 blade and needle tip electrocautery.  Hemostasis secured using   electrocautery and wound irrigated with triple antibiotic saline solution.  A   5 cm transverse incision was made with needle tip electrocautery through   subcutaneous fat and breast tissue down to the grade II capsule, which was   then opened and there was no free fluid or sign of infection.  The implant was   removed and noted to be an intact Allergan style  smooth round moderate   profile silicone breast implant, which was then sent to be sterilized, so it   may be returned to [employer] for warranty evaluation.  The wound was irrigated   with triple antibiotic saline solution and hemostasis secured using   electrocautery.  Some scoring was then performed on the anterior surface of   the pectoralis major muscle lower aspect as well as the undersurface of the   breast allowing this space to be closed off using a number of interrupted   buried 3-0 Monocryl sutures.  The lateral aspect of the breast as well as the   chest wall was then scored with needle tip electrocautery to enhance blood   supply and scarring for the repositioning of lateral fold.  The left breast   lateral fold was advanced about 2.5 cm using running buried #2 layer 2-0   Ethibond suture running from the anterior axillary fold inferiorly to the   lateral aspect of the inframammary fold and a second layer cephalad where the   knots were buried.  Hemostasis was secured and wound irrigated.   At this   point, the packing was removed from the right breast and the patient was   assessed in sitting and supine position and noted that the left breast was   noticeably larger than the right.  Therefore, again, it was decided to use the   [employer]ira SSM-445 smooth round moderate plus profile 445 mL   silicone implant, which was irrigated with triple antibiotic saline solution   and placed in the left breast partial submuscular position where the capsule   and breast tissue were reapproximated closing the pocket using interrupted   buried 3-0 Monocryl sutures.  The lower mastopexy transverse incision was   closed at the inframammary fold using a number of interrupted buried dermal   3-0 Monocryl sutures followed by running subcuticular V-Loc 90 3-0 resorbable   suture from medial to lateral with exteriorized knots.  A few interrupted 4-0   Monocryl were placed in the skin.  Highly satisfactory improvement of   cleavage, upper pole fullness, lateral fold and correction of bottoming out   and ptosis.  Excellent capillary refill and perfusion was noted to the left   nipple-areolar complex and adjacent breast skin.  Steri-Strips were applied   without benzoin.  Attention was then focused to the right breast, which was   further irrigated with triple antibiotic saline solution and hemostasis   secured using electrocautery.  The lower edge of the pectoralis major muscle   was then tacked down to the undersurface of the breast tissue using a number   of interrupted buried 3-0 Monocryl sutures.  Some release of the inferomedial   aspect of the muscle attachments was performed in order to improve the   cleavage, which was wider on the right compared to left.  Hemostasis secured   and wound irrigated.  At this point, a trimmed 7 mm flat-fluted Luis drain   was placed in the superior lateral aspect of the submuscular pocket and   brought out through the lateral extent of the inframammary fold where it was    sutured in place using interrupted 3-0 Prolene suture on the right side.  The   right breast lateral fold was then medially repositioned about 2.5 cm in the   same fashion using running buried #2 layered 2-0 Ethibond suture from the   anterior axillary fold down to the lateral extent of the inframammary fold and   then cephalad as a second layer with a buried knot at the mid axilla.    Excellent symmetry and improvement was noted.  At this point, the [employer] Inspira SSM-485 smooth round moderate plus profile 485 mL silicone   implant was irrigated with triple antibiotic saline solution and then placed   in the right breast partial submuscular position where the breast tissue was   reapproximated closing the pocket using a number of interrupted 3-0 Monocryl   sutures.  The transverse lower mastopexy incision was closed using interrupted   buried dermal 3-0 Monocryl sutures followed by running subcuticular V-Loc 90   3-0 resorbable suture from medial to lateral with exteriorized knot.    Steri-Strips were applied without benzoin.  The patient had been assessed in   sitting and supine positions, achieving excellent symmetry and improvement,   excellent capillary refill and perfusion to the nipple-areolar complex and   adjacent breast skin, minimal output from the drain.  The breasts were dressed   with Xeroform followed by fluff dry gauze and ABD pads, secured snug but not   too tightly with 6-inch Ace wrap.  She had approximately 50 mL blood loss and   no complications.  All counts correct at the end of procedure.  She was   awakened from anesthesia, extubated, and returned to recovery room in stable   condition.       ____________________________________     MD CHRISTOPHER AVERY / NTS    DD:  05/13/2019 12:12:28  DT:  05/13/2019 13:09:24    D#:  [id number]  Job#:  [id number]

## 2019-07-22 ENCOUNTER — APPOINTMENT (OUTPATIENT)
Dept: ADMISSIONS | Facility: MEDICAL CENTER | Age: 39
End: 2019-07-22
Attending: SPECIALIST
Payer: COMMERCIAL

## 2019-07-22 DIAGNOSIS — Z01.812 PRE-OPERATIVE LABORATORY EXAMINATION: ICD-10-CM

## 2019-07-22 LAB
ERYTHROCYTE [DISTWIDTH] IN BLOOD BY AUTOMATED COUNT: 44.6 FL (ref 35.9–50)
HCT VFR BLD AUTO: 42.4 % (ref 37–47)
HGB BLD-MCNC: 13.5 G/DL (ref 12–16)
MCH RBC QN AUTO: 31.2 PG (ref 27–33)
MCHC RBC AUTO-ENTMCNC: 31.8 G/DL (ref 33.6–35)
MCV RBC AUTO: 97.9 FL (ref 81.4–97.8)
PLATELET # BLD AUTO: 313 K/UL (ref 164–446)
PMV BLD AUTO: 10.2 FL (ref 9–12.9)
RBC # BLD AUTO: 4.33 M/UL (ref 4.2–5.4)
WBC # BLD AUTO: 7 K/UL (ref 4.8–10.8)

## 2019-07-22 PROCEDURE — 85027 COMPLETE CBC AUTOMATED: CPT

## 2019-07-22 PROCEDURE — 36415 COLL VENOUS BLD VENIPUNCTURE: CPT

## 2019-07-22 NOTE — OR NURSING
"Pre-admit appointment completed. \"Preparing for your procedure\" sheet given to pt along with verbal and written instructions. Pt instructed to continue regularly prescribed medications through the day before surgery. Pt instructed to take the following medications the day of surgery with a sip of water, per anesthesia protocol; SYNTHROID.     "

## 2019-07-24 ENCOUNTER — ANESTHESIA EVENT (OUTPATIENT)
Dept: SURGERY | Facility: MEDICAL CENTER | Age: 39
End: 2019-07-24
Payer: COMMERCIAL

## 2019-07-24 ENCOUNTER — HOSPITAL ENCOUNTER (OUTPATIENT)
Facility: MEDICAL CENTER | Age: 39
End: 2019-07-24
Attending: SPECIALIST | Admitting: SPECIALIST
Payer: COMMERCIAL

## 2019-07-24 ENCOUNTER — ANESTHESIA (OUTPATIENT)
Dept: SURGERY | Facility: MEDICAL CENTER | Age: 39
End: 2019-07-24
Payer: COMMERCIAL

## 2019-07-24 VITALS
OXYGEN SATURATION: 97 % | HEART RATE: 86 BPM | DIASTOLIC BLOOD PRESSURE: 61 MMHG | SYSTOLIC BLOOD PRESSURE: 113 MMHG | RESPIRATION RATE: 16 BRPM | TEMPERATURE: 99 F | HEIGHT: 62 IN | WEIGHT: 127.87 LBS | BODY MASS INDEX: 23.53 KG/M2

## 2019-07-24 PROCEDURE — 500368 HCHG DRAIN, 7MM FLAT-FLUTED: Performed by: SPECIALIST

## 2019-07-24 PROCEDURE — 700101 HCHG RX REV CODE 250: Performed by: SPECIALIST

## 2019-07-24 PROCEDURE — 700102 HCHG RX REV CODE 250 W/ 637 OVERRIDE(OP): Performed by: ANESTHESIOLOGY

## 2019-07-24 PROCEDURE — 501838 HCHG SUTURE GENERAL: Performed by: SPECIALIST

## 2019-07-24 PROCEDURE — A9270 NON-COVERED ITEM OR SERVICE: HCPCS | Performed by: SPECIALIST

## 2019-07-24 PROCEDURE — 700105 HCHG RX REV CODE 258: Performed by: ANESTHESIOLOGY

## 2019-07-24 PROCEDURE — 500423 HCHG DRESSING, ABD COMBINE: Performed by: SPECIALIST

## 2019-07-24 PROCEDURE — 500389 HCHG DRAIN, RESERVOIR SUCT JP 100CC: Performed by: SPECIALIST

## 2019-07-24 PROCEDURE — 160041 HCHG SURGERY MINUTES - EA ADDL 1 MIN LEVEL 4: Performed by: SPECIALIST

## 2019-07-24 PROCEDURE — 160035 HCHG PACU - 1ST 60 MINS PHASE I: Performed by: SPECIALIST

## 2019-07-24 PROCEDURE — 700111 HCHG RX REV CODE 636 W/ 250 OVERRIDE (IP): Performed by: SPECIALIST

## 2019-07-24 PROCEDURE — 160009 HCHG ANES TIME/MIN: Performed by: SPECIALIST

## 2019-07-24 PROCEDURE — A9270 NON-COVERED ITEM OR SERVICE: HCPCS | Performed by: ANESTHESIOLOGY

## 2019-07-24 PROCEDURE — A6223 GAUZE >16<=48 NO W/SAL W/O B: HCPCS | Performed by: SPECIALIST

## 2019-07-24 PROCEDURE — A6222 GAUZE <=16 IN NO W/SAL W/O B: HCPCS | Performed by: SPECIALIST

## 2019-07-24 PROCEDURE — 502575 HCHG PACK, BREAST: Performed by: SPECIALIST

## 2019-07-24 PROCEDURE — A6403 STERILE GAUZE>16 <= 48 SQ IN: HCPCS | Performed by: SPECIALIST

## 2019-07-24 PROCEDURE — 160048 HCHG OR STATISTICAL LEVEL 1-5: Performed by: SPECIALIST

## 2019-07-24 PROCEDURE — 700111 HCHG RX REV CODE 636 W/ 250 OVERRIDE (IP): Performed by: ANESTHESIOLOGY

## 2019-07-24 PROCEDURE — 700101 HCHG RX REV CODE 250: Performed by: ANESTHESIOLOGY

## 2019-07-24 PROCEDURE — 160025 RECOVERY II MINUTES (STATS): Performed by: SPECIALIST

## 2019-07-24 PROCEDURE — 160029 HCHG SURGERY MINUTES - 1ST 30 MINS LEVEL 4: Performed by: SPECIALIST

## 2019-07-24 PROCEDURE — 700105 HCHG RX REV CODE 258: Performed by: SPECIALIST

## 2019-07-24 PROCEDURE — 160036 HCHG PACU - EA ADDL 30 MINS PHASE I: Performed by: SPECIALIST

## 2019-07-24 PROCEDURE — 160046 HCHG PACU - 1ST 60 MINS PHASE II: Performed by: SPECIALIST

## 2019-07-24 PROCEDURE — 700102 HCHG RX REV CODE 250 W/ 637 OVERRIDE(OP): Performed by: SPECIALIST

## 2019-07-24 PROCEDURE — 160002 HCHG RECOVERY MINUTES (STAT): Performed by: SPECIALIST

## 2019-07-24 DEVICE — IMPLANTABLE DEVICE: Type: IMPLANTABLE DEVICE | Site: BREAST | Status: FUNCTIONAL

## 2019-07-24 RX ORDER — OXYCODONE HYDROCHLORIDE AND ACETAMINOPHEN 5; 325 MG/1; MG/1
2 TABLET ORAL
Status: COMPLETED | OUTPATIENT
Start: 2019-07-24 | End: 2019-07-24

## 2019-07-24 RX ORDER — GENTAMICIN SULFATE 40 MG/ML
INJECTION, SOLUTION INTRAMUSCULAR; INTRAVENOUS
Status: DISCONTINUED | OUTPATIENT
Start: 2019-07-24 | End: 2019-07-24 | Stop reason: HOSPADM

## 2019-07-24 RX ORDER — LIDOCAINE HYDROCHLORIDE AND EPINEPHRINE 5; 5 MG/ML; UG/ML
INJECTION, SOLUTION INFILTRATION; PERINEURAL
Status: DISCONTINUED | OUTPATIENT
Start: 2019-07-24 | End: 2019-07-24 | Stop reason: HOSPADM

## 2019-07-24 RX ORDER — GABAPENTIN 300 MG/1
300 CAPSULE ORAL ONCE
Status: DISCONTINUED | OUTPATIENT
Start: 2019-07-24 | End: 2019-07-24 | Stop reason: HOSPADM

## 2019-07-24 RX ORDER — SCOLOPAMINE TRANSDERMAL SYSTEM 1 MG/1
1 PATCH, EXTENDED RELEASE TRANSDERMAL
Status: DISCONTINUED | OUTPATIENT
Start: 2019-07-24 | End: 2019-07-25 | Stop reason: HOSPADM

## 2019-07-24 RX ORDER — CEFAZOLIN SODIUM 1 G/3ML
INJECTION, POWDER, FOR SOLUTION INTRAMUSCULAR; INTRAVENOUS
Status: DISCONTINUED | OUTPATIENT
Start: 2019-07-24 | End: 2019-07-24 | Stop reason: HOSPADM

## 2019-07-24 RX ORDER — OXYCODONE HYDROCHLORIDE AND ACETAMINOPHEN 5; 325 MG/1; MG/1
1 TABLET ORAL
Status: COMPLETED | OUTPATIENT
Start: 2019-07-24 | End: 2019-07-24

## 2019-07-24 RX ORDER — DEXAMETHASONE SODIUM PHOSPHATE 4 MG/ML
INJECTION, SOLUTION INTRA-ARTICULAR; INTRALESIONAL; INTRAMUSCULAR; INTRAVENOUS; SOFT TISSUE PRN
Status: DISCONTINUED | OUTPATIENT
Start: 2019-07-24 | End: 2019-07-24 | Stop reason: SURG

## 2019-07-24 RX ORDER — DIPHENHYDRAMINE HYDROCHLORIDE 50 MG/ML
12.5 INJECTION INTRAMUSCULAR; INTRAVENOUS
Status: DISCONTINUED | OUTPATIENT
Start: 2019-07-24 | End: 2019-07-25 | Stop reason: HOSPADM

## 2019-07-24 RX ORDER — HYDRALAZINE HYDROCHLORIDE 20 MG/ML
5 INJECTION INTRAMUSCULAR; INTRAVENOUS
Status: DISCONTINUED | OUTPATIENT
Start: 2019-07-24 | End: 2019-07-25 | Stop reason: HOSPADM

## 2019-07-24 RX ORDER — LABETALOL HYDROCHLORIDE 5 MG/ML
5 INJECTION, SOLUTION INTRAVENOUS
Status: DISCONTINUED | OUTPATIENT
Start: 2019-07-24 | End: 2019-07-25 | Stop reason: HOSPADM

## 2019-07-24 RX ORDER — SODIUM CHLORIDE, SODIUM LACTATE, POTASSIUM CHLORIDE, CALCIUM CHLORIDE 600; 310; 30; 20 MG/100ML; MG/100ML; MG/100ML; MG/100ML
INJECTION, SOLUTION INTRAVENOUS CONTINUOUS
Status: ACTIVE | OUTPATIENT
Start: 2019-07-24 | End: 2019-07-24

## 2019-07-24 RX ORDER — CEFAZOLIN SODIUM 1 G/3ML
INJECTION, POWDER, FOR SOLUTION INTRAMUSCULAR; INTRAVENOUS PRN
Status: DISCONTINUED | OUTPATIENT
Start: 2019-07-24 | End: 2019-07-24 | Stop reason: SURG

## 2019-07-24 RX ORDER — METOPROLOL TARTRATE 1 MG/ML
1 INJECTION, SOLUTION INTRAVENOUS
Status: DISCONTINUED | OUTPATIENT
Start: 2019-07-24 | End: 2019-07-25 | Stop reason: HOSPADM

## 2019-07-24 RX ORDER — ONDANSETRON 2 MG/ML
INJECTION INTRAMUSCULAR; INTRAVENOUS PRN
Status: DISCONTINUED | OUTPATIENT
Start: 2019-07-24 | End: 2019-07-24 | Stop reason: SURG

## 2019-07-24 RX ORDER — ACETAMINOPHEN 500 MG
1000 TABLET ORAL ONCE
Status: DISCONTINUED | OUTPATIENT
Start: 2019-07-24 | End: 2019-07-24 | Stop reason: HOSPADM

## 2019-07-24 RX ORDER — DIPHENHYDRAMINE HYDROCHLORIDE 50 MG/ML
INJECTION INTRAMUSCULAR; INTRAVENOUS PRN
Status: DISCONTINUED | OUTPATIENT
Start: 2019-07-24 | End: 2019-07-24 | Stop reason: SURG

## 2019-07-24 RX ORDER — SODIUM CHLORIDE, SODIUM LACTATE, POTASSIUM CHLORIDE, CALCIUM CHLORIDE 600; 310; 30; 20 MG/100ML; MG/100ML; MG/100ML; MG/100ML
INJECTION, SOLUTION INTRAVENOUS CONTINUOUS
Status: DISCONTINUED | OUTPATIENT
Start: 2019-07-24 | End: 2019-07-25 | Stop reason: HOSPADM

## 2019-07-24 RX ORDER — HALOPERIDOL 5 MG/ML
1 INJECTION INTRAMUSCULAR
Status: DISCONTINUED | OUTPATIENT
Start: 2019-07-24 | End: 2019-07-25 | Stop reason: HOSPADM

## 2019-07-24 RX ORDER — BACITRACIN 50000 [IU]/1
INJECTION, POWDER, FOR SOLUTION INTRAMUSCULAR
Status: DISCONTINUED | OUTPATIENT
Start: 2019-07-24 | End: 2019-07-24 | Stop reason: HOSPADM

## 2019-07-24 RX ADMIN — FENTANYL CITRATE 50 MCG: 50 INJECTION, SOLUTION INTRAMUSCULAR; INTRAVENOUS at 13:50

## 2019-07-24 RX ADMIN — MIDAZOLAM HYDROCHLORIDE 2 MG: 1 INJECTION, SOLUTION INTRAMUSCULAR; INTRAVENOUS at 13:17

## 2019-07-24 RX ADMIN — FENTANYL CITRATE 50 MCG: 50 INJECTION, SOLUTION INTRAMUSCULAR; INTRAVENOUS at 13:30

## 2019-07-24 RX ADMIN — FENTANYL CITRATE 50 MCG: 50 INJECTION, SOLUTION INTRAMUSCULAR; INTRAVENOUS at 14:20

## 2019-07-24 RX ADMIN — FENTANYL CITRATE 50 MCG: 50 INJECTION, SOLUTION INTRAMUSCULAR; INTRAVENOUS at 15:45

## 2019-07-24 RX ADMIN — DIPHENHYDRAMINE HYDROCHLORIDE 25 MG: 50 INJECTION, SOLUTION INTRAMUSCULAR; INTRAVENOUS at 13:19

## 2019-07-24 RX ADMIN — OXYCODONE HYDROCHLORIDE AND ACETAMINOPHEN 1 TABLET: 5; 325 TABLET ORAL at 17:29

## 2019-07-24 RX ADMIN — SODIUM CHLORIDE, POTASSIUM CHLORIDE, SODIUM LACTATE AND CALCIUM CHLORIDE: 600; 310; 30; 20 INJECTION, SOLUTION INTRAVENOUS at 10:16

## 2019-07-24 RX ADMIN — FENTANYL CITRATE 25 MCG: 50 INJECTION INTRAMUSCULAR; INTRAVENOUS at 16:47

## 2019-07-24 RX ADMIN — PROPOFOL 150 MG: 10 INJECTION, EMULSION INTRAVENOUS at 13:13

## 2019-07-24 RX ADMIN — CEFAZOLIN 2 G: 1 INJECTION, POWDER, FOR SOLUTION INTRAVENOUS at 12:58

## 2019-07-24 RX ADMIN — HALOPERIDOL LACTATE 1 MG: 5 INJECTION INTRAMUSCULAR at 16:20

## 2019-07-24 RX ADMIN — FENTANYL CITRATE 50 MCG: 50 INJECTION, SOLUTION INTRAMUSCULAR; INTRAVENOUS at 15:05

## 2019-07-24 RX ADMIN — ONDANSETRON 4 MG: 2 INJECTION INTRAMUSCULAR; INTRAVENOUS at 15:02

## 2019-07-24 RX ADMIN — SCOPALAMINE 1 PATCH: 1 PATCH, EXTENDED RELEASE TRANSDERMAL at 10:15

## 2019-07-24 RX ADMIN — SODIUM CHLORIDE, POTASSIUM CHLORIDE, SODIUM LACTATE AND CALCIUM CHLORIDE: 600; 310; 30; 20 INJECTION, SOLUTION INTRAVENOUS at 17:00

## 2019-07-24 RX ADMIN — DEXAMETHASONE SODIUM PHOSPHATE 8 MG: 4 INJECTION, SOLUTION INTRAMUSCULAR; INTRAVENOUS at 13:01

## 2019-07-24 RX ADMIN — FENTANYL CITRATE 100 MCG: 50 INJECTION, SOLUTION INTRAMUSCULAR; INTRAVENOUS at 13:13

## 2019-07-24 RX ADMIN — FENTANYL CITRATE 25 MCG: 50 INJECTION INTRAMUSCULAR; INTRAVENOUS at 17:34

## 2019-07-24 ASSESSMENT — PAIN SCALES - GENERAL: PAIN_LEVEL: 0

## 2019-07-24 NOTE — PROGRESS NOTES
"1610 To PACU from OR via gurney, side rails up x 2 for safety, lungs clear bilaterally, scds on patient and machine operational, dressing CDI to chest with 2 PANCHO drains coming from distal dressing/wrap at flank. R PANCHO drain as small, serosanguinous drainage and L PANCHO drain has moderate, serosanguinous drainage noted. Pt awake on arrival and denies pain or nausea. Breasts appear symmetrical under dressing.   1615 Pt reports \"I am super nauseous\". Reports chest pressure but no pain. B PANCHO drains secured with safety pins to dressing. Given queasease as well as will give Haldol PRN for comfort.   1630 Pt reports pain but \"I want the nausea to calm down before I get pain medication\". Pt reports feeling \"hot\" and saida hugger gown removed with cotton blanket over patient and given ice pack for posterior neck for comfort.   1645 Pt reports nausea improved and tolerable and requesting ice chips. Pain rated as 8/10 to L breast only. Plan to give IV Fentanyl diluted to avoid further exacerbating nausea.   1700 Pt reports pain as improved to 6/10 to L breast and tolerable. Nausea reported as minimal and tolerating ice chips. Resting quietly with eyes closed but arouses easily to voice.   1705 Discussed pain management for transport home; pt reports nausea almost resolved and pain reported as improving and rated as 7/10 to L breast and tolerable. Pt would prefer to take oral Percocet prior to discharge for pain control; given sip of water and will try cracker prior to Percocet. HOB elevated to 45 degrees.   1720 Pt reports \"needing to pee\"; pt awake and does not appear sleepy. Denies nausea after eating crackers and sipping water. Pain rated as tolerable at 6/10 to L breast.   1730 Pain rated as not quite tolerable; plan to give IV Fentanyl PRN as pulse ox 95-97% on RA. Pt able to void in BR without difficulty as well and ambulated well to/from BR about 30+ feet total.   1740 Instructed to use IS x 10 every hour while awake; " demonstrated understanding. IS max 2000. Goal 2350.  1750 Pt reports pain as tolerable and denies nausea. Meets criteria for transfer to stage II. Will discharge from PACU.   1824 D/Leandro to care of family post uneventful stay in PACU 2.

## 2019-07-24 NOTE — ANESTHESIA POSTPROCEDURE EVALUATION
Patient: Kathleen Alvarado    Procedure Summary     Date:  07/24/19 Room / Location:   OR  / SURGERY [facility]    Anesthesia Start:  1258 Anesthesia Stop:  1612    Procedures:       INSERTION, IMPLANT, BREAST- EXCHANGE SILICONE (Left Breast)      CAPSULECTOMY (N/A Breast)      RECONSTRUCTION, BREAST - PLACEMENT OF STRATTICE, REVISION OF RIGHT FULL BREAST LIFT AND STRACTTICE REINFORCEMENT (N/A Breast) Diagnosis:  (REVISION OF RIGHT FULL BREAST LIFT, LEFT IMPLANT EXCHANGE)    Surgeon:  Bg Dumont M.D. Responsible Provider:  Janes Monroy D.O.    Anesthesia Type:  general ASA Status:  2          Final Anesthesia Type: general  Last vitals  BP   Blood Pressure: 102/75    Temp   37.2 °C (99 °F)    Pulse   Pulse: 86   Resp   16    SpO2   97 %      Anesthesia Post Evaluation    Patient location during evaluation: PACU  Patient participation: complete - patient participated  Level of consciousness: awake and alert  Pain score: 0    Airway patency: patent  Anesthetic complications: no  Cardiovascular status: hemodynamically stable  Respiratory status: acceptable  Hydration status: euvolemic    PONV: none           Nurse Pain Score: 0 (NPRS)

## 2019-07-24 NOTE — ANESTHESIA PROCEDURE NOTES
Airway  Date/Time: 7/24/2019 1:18 PM  Performed by: CURTIS MARTIN  Authorized by: CURTIS MARTIN     Location:  OR  Urgency:  Elective  Indications for Airway Management:  Anesthesia  Spontaneous Ventilation: absent    Sedation Level:  Deep  Preoxygenated: Yes    Final Airway Type:  Supraglottic airway  Final Supraglottic Airway:  Standard LMA  SGA Size:  4  Cuff Pressure (cm H2O):  30  Number of Attempts at Approach:  1

## 2019-07-24 NOTE — ANESTHESIA QCDR
2019 [facility] Clinical Data Registry (for Quality Improvement)     Postoperative nausea/vomiting risk protocol (Adult = 18 yrs and Pediatric 3-17 yrs)- (430 and 463)  General inhalation anesthetic (NOT TIVA) with PONV risk factors: Yes  Provision of anti-emetic therapy with at least 2 different classes of agents: Yes   Patient DID NOT receive anti-emetic therapy and reason is documented in Medical Record:  N/A    Multimodal Pain Management- (AQI59)  Patient undergoing Elective Surgery (i.e. Outpatient, or ASC, or Prescheduled Surgery prior to Hospital Admission): Yes  Use of Multimodal Pain Management, two or more drugs and/or interventions, NOT including systemic opioids: Yes   Exception: Documented allergy to multiple classes of analgesics:  N/A    PACU assessment of acute postoperative pain prior to Anesthesia Care End- Applies to Patients Age = 18- (ABG7)  Initial PACU pain score is which of the following: < 7/10  Patient unable to report pain score: N/A    Post-anesthetic transfer of care checklist/protocol to PACU/ICU- (426 and 427)  Upon conclusion of case, patient transferred to which of the following locations: PACU/Non-ICU  Use of transfer checklist/protocol: Yes  Exclusion: Service Performed in Patient Hospital Room (and thus did not require transfer): N/A    PACU Reintubation- (AQI31)  General anesthesia requiring endotracheal intubation (ETT) along with subsequent extubation in OR or PACU: No  Required reintubation in the PACU: N/A  Extubation was a planned trial documented in the medical record prior to removal of the original airway device: N/A    Unplanned admission to ICU related to anesthesia service up through end of PACU care- (MD51)  Unplanned admission to ICU (not initially anticipated at anesthesia start time): No

## 2019-07-24 NOTE — OR SURGEON
Immediate Post OP Note    PreOp Diagnosis: Recurrent left breast capsular contracture and right breast bottoming out    PostOp Diagnosis: Same    Procedure(s):  INSERTION, IMPLANT, BREAST- EXCHANGE SILICONE  CAPSULECTOMY  RECONSTRUCTION, BREAST - PLACEMENT OF STRATTICE, REVISION OF RIGHT FULL BREAST LIFT AND STRACTTICE REINFORCEMENT    Surgeon(s):  Bg Dumont M.D.    Anesthesiologist/Type of Anesthesia:  Anesthesiologist: Janes Monroy D.O./General    Surgical Staff:  Circulator: Samir Christensen R.N.  Scrub Person: Erwin Goldman    Specimens removed if any:  * No specimens in log *    Estimated Blood Loss: 100 cc    Findings: See dictation    Complications: None        7/24/2019 4:02 PM Bg Dumont M.D.

## 2019-07-24 NOTE — ANESTHESIA TIME REPORT
Anesthesia Start and Stop Event Times     Date Time Event    7/24/2019 1258 Anesthesia Start     1612 Anesthesia Stop        Responsible Staff  07/24/19    Name Role Begin End    Janes Monroy D.O. Anesth 1258 1612        Preop Diagnosis (Free Text):  Pre-op Diagnosis     REVISION OF RIGHT FULL BREAST LIFT, LEFT IMPLANT EXCHANGE        Preop Diagnosis (Codes):  Diagnosis Information     Diagnosis Code(s):         Post op Diagnosis  Encounter for cosmetic surgery      Premium Reason  A. 3PM - 7AM    Comments:

## 2019-07-24 NOTE — ANESTHESIA PREPROCEDURE EVALUATION
Relevant Problems   (+) Hypothyroidism       Physical Exam    Airway   Mallampati: II  TM distance: >3 FB  Neck ROM: full       Cardiovascular - normal exam  Rhythm: regular  Rate: normal  (-) murmur     Dental - normal exam         Pulmonary - normal exam  Breath sounds clear to auscultation     Abdominal    Neurological - normal exam                 Anesthesia Plan    ASA 2       Plan - general       Airway plan will be LMA        Induction: intravenous    Postoperative Plan: Postoperative administration of opioids is intended.    Pertinent diagnostic labs and testing reviewed    Informed Consent:    Anesthetic plan and risks discussed with patient.    Use of blood products discussed with: patient whom consented to blood products.

## 2019-07-25 NOTE — OP REPORT
DATE OF SERVICE:  07/24/2019    SERVICE:  Plastic surgery.    SURGEON:  Gian Dumont MD    ANESTHESIOLOGIST:  Janes Monroy DO    ANESTHESIA:  General.    PREOPERATIVE DIAGNOSES:  Left breast grade III capsular contracture and   recurrent right breast lower ptosis with laxity.    POSTOPERATIVE DIAGNOSES:  Left breast grade III capsular contracture and   recurrent right breast lower ptosis with laxity.    OPERATIVE PROCEDURE:  Exchange of left silicone breast implant with   capsulectomy and placement of Strattice BPS, and revision of right mastopexy with   Strattice BPS reinforcement.    INDICATIONS:  A 39-year-old female who has had multiple children underwent   removal of her ruptured left silicone breast implant with capsulectomy and   exchange of bilateral breast implants where the [employer] Inspira   SSM-445 smooth round moderate plus profile 445 mL silicone implant was placed   in the left breast and SSM-485 smooth round moderate plus profile 485 mL   silicone implant was placed in the right breast.  This was performed on   05/13/2019.  She also underwent bilateral full breast lift and repositioning   of lateral folds.  Postoperatively, she developed a progressive left capsular   contracture and the right breast remained very supple with a grade I capsule   whereas on the left, there was a grade III capsule.  As a result, the right   breast descended and became more laterally displaced as well as inferior   migration of the inframammary fold.  The left breast remained fixed with a   worsening capsular contracture.  A capsular contracture warranty has been   opened with [employer] and patient wishes to have the SSM-485 smooth round   moderate plus profile 485 mL silicone implant placed instead of the 445 mL   implant on the left.  She will undergo the procedure under general anesthesia   as an outpatient and understands the risks, benefits, and alternatives   including, but not limited to the  risks of bleeding, hematoma, seroma,   infection, wound dehiscence, painful or unsightly scarring, hypertrophic or   keloid scarring, painful neuroma, sensory loss or decrease, nipple sensory   loss or decrease, nipple necrosis, nipple malposition, asymmetry or   irregularity, skin, fat, breast necrosis, benign and malignant breast disease,   injury to muscle, injury to ribs, pneumothorax, DVT, pulmonary embolism, fat   embolism, implant infection, capsular contracture, deflation, rupture or   leakage, extrusion or exposure, complications related to silicone, silicone   granuloma, silicone lymphadenitis, silicone extrusion, migration, palpability   or visibility, ALCL, implant that was too large or too small, implant   firmness, rippling, tenderness, implant asymmetry, chronic breast or chest   pain, lymphedema, pigmentation changes, stretch marks, ptosis,   telangiectasias, bottoming out, lateral displacement, poor definition of   cleavage and upper pole, persistent or recurrent left capsular contracture or   right capsular contracture, complications related to placement of Strattice   BPS, incomplete vascularization or take of Strattice, sequestrum, breakdown or   disintegration of Strattice, capsular contracture of Strattice, persistent or   recurrent deformities and asymmetry, changes with weight gain or loss,   changes with aging, medications, health condition, trauma, infection, sun   exposure, cardiovascular or cardiorespiratory compromise, aspiration   pneumonitis, hemorrhage, need for transfusion, complications related to   suture, Strattice, Steri-Strips, dressings, Xeroform, drains, general   anesthesia, mortality, and need for future revision.  The patient is motivated   and signed informed consent.    OPERATIVE REPORT:  The patient was marked preoperatively in sitting position   indicating midline as well as inframammary fold and the level of second rib.    She has a grade III capsular contracture on  the left where the inframammary   fold is about 2 cm higher than the right side.  The right breast has a grade I   capsule and has a little more lateral displacement and bottoming out compared   to the left side.  The bilateral nipple areolar complexes are somewhat   similar except there is slight flaring of the right compared to the left since   there is a supple capsule on the right.  The patient feels that the left   breast is slightly smaller than the right and therefore she wishes to have the   485 mL silicone implant instead of 445 mL placed, which is currently in the   left breast. This may be due to the capsular contracture. She will maintain the   same right breast implant.  Finally, the placement of Strattice on the left is to   help prevent recurrent capsular contracture, although there is no guarantee   and on the right will be used for reinforcement on the lateral and inferior aspects   of the breast to prevent further downward and lateral displacement with a grade I   capsule on the right. The patient was then taken to the OR where she was prepped and draped in   supine position under general anesthesia.  Sequential stockings were placed.    Starting on the left side, the breast was infiltrated with 0.5% Xylocaine with   1:200,000 dilution epinephrine.  An incision was made along the entire length   of the left breast inframammary scar, which has healed very nicely and is   very fine. This was done with a 15 blade from medial to lateral.  Once this   was opened through skin and subcutaneous tissue, a needle tip electrocautery   was used to go through subcutaneous fat and breast tissue down to the grade   III capsular contracture. The capsule was opened and the Allergan Inspira SSM-445   silicone implant was removed and noted to be intact with no obvious evidence of   infection or free fluid in the pocket. The implant was sent to be sterilized so it may   be returned to [employer] for warranty evaluation.  The capsular contracture underwent capsulectomy by   elevating off the breast tissue and then the undersurface of the pectoralis   major muscle up to the upper aspect and then medially to the midline and   laterally to the lateral aspect of the chest.  Some capsule was also elevated   off of the chest wall.  The capsule appeared to be quite a thickened grade III   capsular contracture.  The capsule was removed and hemostasis was secured   using the electrocautery.  The wound was irrigated with   triple antibiotic saline solution.  The 2-0 Ethibond suture along the lateral   aspect of the left chest wall was removed with the capsulectomy.  That suture   was placed to reposition the lateral fold in the last procedure, a couple   of months earlier.  Hemostasis secured and wound irrigated.  At this point, a   sheet of Strattice BPS Contour 3 was soaked in triple antibiotic saline   solution and then oriented in the correct position and then trimmed to fit the   left breast pocket.  A strip was kept moist in antibiotic saline solution to   use on the right breast reinforcement.  The superior edge of the Strattice BPS   was sutured with a number of interrupted buried 3-0 Monocryl sutures to the   lower edge of the pectoralis major muscle.  It was then sutured to the lateral   chest wall to prevent lateral displacement of the implant and to the medial   area where the cleavage was.  It was tacked down to the undersurface of the   breast tissue as well.  Once this was performed, a trim 7 mm flat fluted Luis   drain was placed in the submuscular/sub-Strattice pocket and brought out   through a separate stab incision in the lateral aspect of the inframammary   fold through the old drain scar where it was sutured in place using   interrupted 3-0 Prolene drain suture.  At this point, after excellent   hemostasis and irrigation, the [employer] Inspira SSM-485 smooth round   moderate plus profile 485 mL silicone implant  was placed in the left breast   partial submuscular/sub-Strattice pocket.  The lower edge of the Strattice was   then advanced towards the inframammary fold breast tissue and sutured in   place along the entire length using interrupted buried 3-0 Monocryl sutures.    The breast tissue was then sewn over the Strattice to enhance blood supply and   coverage using interrupted buried 3-0 Monocryl sutures and then the   transverse inframammary incision was closed at the skin level using   interrupted buried dermal 3-0 Monocryl sutures followed by running   subcuticular V-Loc 90 3-0 resorbable suture with exteriorized knot laterally.    Minimal output from the drain.  Highly satisfactory symmetry and   improvement of the left breast was noted.  Excellent capillary refill and   perfusion to the left nipple areolar complex and adjacent breast skin.  The   breast was assessed in sitting and supine positions.  Attention was then   focused to the right breast where there is some recurrent ptosis and bottoming   out with stretching of the lower breast skin and the inframammary fold was   about 2 cm lower than the left.  As a result, this was marked to match the   distance transversely from the lower aspect of the right nipple areolar   complex to left side.  In doing so, some skin was resected in a transverse   elliptical fashion from just below the right inframammary scar from medial to   lateral extent and about 4-4.5 cm above where the excess lower breast skin   will be removed in order to tighten the right breast where it was bottomed out   and had some lower ptosis.  This was done with a 15 blade after infiltration   with 0.5% Xylocaine with 1:200,000 dilution epinephrine.  Once the old scar   and the ellipse of skin was resected and deepithelialized using 15 blade and   needle tip electrocautery, undermining performed and hemostasis secured.    Dissection was then carried through the subcutaneous fat and breast tissue    down to the grade I capsule.  There was no free fluid and no sign of   infection.  The implant was removed and noted to be intact.  Allergan [employer] SSM-485 smooth round moderate plus profile 485 mL saline implant,   which was placed in triple antibiotic saline solution container and protected.    At this point, the pocket was examined and had a grade I capsule throughout.    It was noted that some of the lateral fold reconstruction had stretched and   therefore some capsule on the lateral aspect of the chest and lateral chest   wall was removed as well as the old buried 2-0 Ethibond suture.  Hemostasis   secured and wound irrigated with triple antibiotic saline solution.  Along the   lateral aspect of the breast and chest, the fold was advanced a couple of   centimeters and secured with a 2.5 cm wide x about 8 cm long strip of   Strattice BPS, which was tacked down with running and interrupted 3-0 Monocryl   sutures to the lateral chest wall and to the lateral aspect of the breast,   reinforcing the lateral aspect.  Another strip of about the same size was then   laid at the inframammary fold, elevating it by about 1.5-2 cm to match the   new position of the left inframammary fold and it was sutured in the same   fashion with interrupted and running buried 3-0 Monocryl sutures.  Hemostasis   secured and wound irrigated with triple antibiotic saline solution.  A trim 7   mm flat fluted Luis drain was placed in the superolateral aspect of the   submuscular pocket and brought out through the lateral extent of the   inframammary fold where it was sutured in place using interrupted 3-0 Prolene   drain suture.  The Allergan [employer] SSM-485 smooth round moderate   plus profile 485 mL silicone implant, which was removed and kept sterile was   again irrigated with triple antibiotic saline solution and placed back into   the right breast partial submuscular pocket, which was then closed in layers   by  reapproximating the breast tissue and grade I capsule using interrupted   buried 3-0 Monocryl sutures followed by interrupted buried dermal 3-0 Monocryl   sutures and running subcuticular V-Loc 90 3-0 resorbable suture from medial   to lateral with exteriorized knot.  Minimal output from the drain.  The   patient was placed in sitting and supine positions and noted to have highly   satisfactory symmetry and improvement.  She was placed back in supine position   and Steri-Strips were then applied without benzoin.  The breasts were then   dressed with Xeroform followed by fluff dry gauze and ABD pads, secured snug,   but not too tightly with 6-inch Ace wrap.  She had approximately 100 mL blood   loss, no complications.  All counts were correct at the end of the procedure.    She was awakened from anesthesia, extubated and returned to recovery room in   stable condition.       ____________________________________     MD CHRISTOPHER AVERY / DEWAYNE    DD:  07/24/2019 16:57:07  DT:  07/24/2019 17:44:40    D#:  [id number]  Job#:  [id number]

## 2019-07-25 NOTE — DISCHARGE INSTRUCTIONS
ACTIVITY: Rest and take it easy for the first 24 hours.  A responsible adult is recommended to remain with you during that time.  It is normal to feel sleepy.  We encourage you to not do anything that requires balance, judgment or coordination.    MILD FLU-LIKE SYMPTOMS ARE NORMAL. YOU MAY EXPERIENCE GENERALIZED MUSCLE ACHES, THROAT IRRITATION, HEADACHE AND/OR SOME NAUSEA.    FOR 24 HOURS DO NOT:  Drive, operate machinery or run household appliances.  Drink beer or alcoholic beverages.   Make important decisions or sign legal documents.    SPECIAL INSTRUCTIONS: Remove scopolamine patch after 72 hours; immediately wash skin then hands with soap and water  Keep dressing clean, dry and intact  Keep head of bed elevated to 30 degrees (priop up on several pillows when resting or sleeping)  Use incentive spirometer x 10 every hour while awake   No shower   Check and milk PANCHO drain every 4 hours; empty drain every 12 hours or when half full  Follow up in my office tomorrow    DIET: To avoid nausea, slowly advance diet as tolerated, avoiding spicy or greasy foods for the first day.  Add more substantial food to your diet according to your physician's instructions.  INCREASE FLUIDS AND FIBER TO AVOID CONSTIPATION.    FOLLOW-UP APPOINTMENT:  A follow-up appointment should be arranged with your doctor in office tomorrow; call to schedule.    You should CALL YOUR PHYSICIAN if you develop:  Fever greater than 101 degrees F.  Pain not relieved by medication, or persistent nausea or vomiting.  Excessive bleeding (blood soaking through dressing) or unexpected drainage from the wound.  Extreme redness or swelling around the incision site, drainage of pus or foul smelling drainage.  Inability to urinate or empty your bladder within 8 hours.  Problems with breathing or chest pain.    You should call 911 if you develop problems with breathing or chest pain.  If you are unable to contact your doctor or surgical center, you should go to  the nearest emergency room or urgent care center.  Dr Dumont's telephone #: [phone]    If any questions arise, call your doctor.  If your doctor is not available, please feel free to call the Surgical Center at [phone].  The Center is open Monday through Friday from 7AM to 7PM.  You can also call the HEALTH HOTLINE open 24 hours/day, 7 days/week and speak to a nurse at [phone], or toll free at [phone].    A registered nurse may call you a few days after your surgery to see how you are doing after your procedure.    MEDICATIONS: Resume taking daily medication.  Take prescribed pain medication with food.  If no medication is prescribed, you may take non-aspirin pain medication if needed.  PAIN MEDICATION CAN BE VERY CONSTIPATING.  Take a stool softener or laxative such as senokot, pericolace, or milk of magnesia if needed.    Prescription given for Home.  Last pain medication given at 5:34pm. Percocet 5/325mg    If your physician has prescribed pain medication that includes Acetaminophen (Tylenol), do not take additional Acetaminophen (Tylenol) while taking the prescribed medication.    Depression / Suicide Risk    As you are discharged from this [facility] facility, it is important to learn how to keep safe from harming yourself.    Recognize the warning signs:  · Abrupt changes in personality, positive or negative- including increase in energy   · Giving away possessions  · Change in eating patterns- significant weight changes-  positive or negative  · Change in sleeping patterns- unable to sleep or sleeping all the time   · Unwillingness or inability to communicate  · Depression  · Unusual sadness, discouragement and loneliness  · Talk of wanting to die  · Neglect of personal appearance   · Rebelliousness- reckless behavior  · Withdrawal from people/activities they love  · Confusion- inability to concentrate     If you or a loved one observes any of these behaviors or has concerns about  self-harm, here's what you can do:  · Talk about it- your feelings and reasons for harming yourself  · Remove any means that you might use to hurt yourself (examples: pills, rope, extension cords, firearm)  · Get professional help from the community (Mental Health, Substance Abuse, psychological counseling)  · Do not be alone:Call your Safe Contact- someone whom you trust who will be there for you.  · Call your local CRISIS HOTLINE 656-5576 or 720-475-4763  · Call your local Children's Mobile Crisis Response Team [place] (560) 085-7107 or www.TopBlip  · Call the toll free National Suicide Prevention Hotlines   · National Suicide Prevention Lifeline 429-973-WAFY (0577)  · National Hope Line Network 800-SUICIDE (586-3960)

## 2019-07-30 ENCOUNTER — APPOINTMENT (OUTPATIENT)
Dept: RADIOLOGY | Facility: MEDICAL CENTER | Age: 39
End: 2019-07-30
Attending: NURSE PRACTITIONER
Payer: COMMERCIAL

## 2019-08-07 ENCOUNTER — APPOINTMENT (OUTPATIENT)
Dept: RADIOLOGY | Facility: MEDICAL CENTER | Age: 39
End: 2019-08-07
Attending: EMERGENCY MEDICINE
Payer: COMMERCIAL

## 2019-08-07 ENCOUNTER — APPOINTMENT (OUTPATIENT)
Dept: RADIOLOGY | Facility: MEDICAL CENTER | Age: 39
End: 2019-08-07
Payer: COMMERCIAL

## 2019-08-07 ENCOUNTER — HOSPITAL ENCOUNTER (EMERGENCY)
Facility: MEDICAL CENTER | Age: 39
End: 2019-08-08
Attending: EMERGENCY MEDICINE
Payer: COMMERCIAL

## 2019-08-07 DIAGNOSIS — R07.9 CHEST PAIN, UNSPECIFIED TYPE: ICD-10-CM

## 2019-08-07 LAB
ALBUMIN SERPL BCP-MCNC: 4.4 G/DL (ref 3.2–4.9)
ALBUMIN/GLOB SERPL: 1.5 G/DL
ALP SERPL-CCNC: 64 U/L (ref 30–99)
ALT SERPL-CCNC: 49 U/L (ref 2–50)
ANION GAP SERPL CALC-SCNC: 12 MMOL/L (ref 0–11.9)
APPEARANCE UR: CLEAR
AST SERPL-CCNC: 32 U/L (ref 12–45)
BASOPHILS # BLD AUTO: 0.5 % (ref 0–1.8)
BASOPHILS # BLD: 0.09 K/UL (ref 0–0.12)
BILIRUB SERPL-MCNC: 0.6 MG/DL (ref 0.1–1.5)
BILIRUB UR QL STRIP.AUTO: NEGATIVE
BUN SERPL-MCNC: 9 MG/DL (ref 8–22)
CALCIUM SERPL-MCNC: 9.3 MG/DL (ref 8.5–10.5)
CHLORIDE SERPL-SCNC: 108 MMOL/L (ref 96–112)
CO2 SERPL-SCNC: 14 MMOL/L (ref 20–33)
COLOR UR: YELLOW
CREAT SERPL-MCNC: 0.85 MG/DL (ref 0.5–1.4)
EKG IMPRESSION: NORMAL
EOSINOPHIL # BLD AUTO: 0.06 K/UL (ref 0–0.51)
EOSINOPHIL NFR BLD: 0.3 % (ref 0–6.9)
ERYTHROCYTE [DISTWIDTH] IN BLOOD BY AUTOMATED COUNT: 41.5 FL (ref 35.9–50)
GLOBULIN SER CALC-MCNC: 2.9 G/DL (ref 1.9–3.5)
GLUCOSE SERPL-MCNC: 105 MG/DL (ref 65–99)
GLUCOSE UR STRIP.AUTO-MCNC: NEGATIVE MG/DL
HCT VFR BLD AUTO: 37.7 % (ref 37–47)
HGB BLD-MCNC: 12.3 G/DL (ref 12–16)
IMM GRANULOCYTES # BLD AUTO: 0.09 K/UL (ref 0–0.11)
IMM GRANULOCYTES NFR BLD AUTO: 0.5 % (ref 0–0.9)
KETONES UR STRIP.AUTO-MCNC: 15 MG/DL
LACTATE BLD-SCNC: 0.9 MMOL/L (ref 0.5–2)
LACTATE BLD-SCNC: 1.9 MMOL/L (ref 0.5–2)
LEUKOCYTE ESTERASE UR QL STRIP.AUTO: NEGATIVE
LYMPHOCYTES # BLD AUTO: 1.27 K/UL (ref 1–4.8)
LYMPHOCYTES NFR BLD: 6.9 % (ref 22–41)
MCH RBC QN AUTO: 30.6 PG (ref 27–33)
MCHC RBC AUTO-ENTMCNC: 32.6 G/DL (ref 33.6–35)
MCV RBC AUTO: 93.8 FL (ref 81.4–97.8)
MICRO URNS: ABNORMAL
MONOCYTES # BLD AUTO: 1.19 K/UL (ref 0–0.85)
MONOCYTES NFR BLD AUTO: 6.5 % (ref 0–13.4)
NEUTROPHILS # BLD AUTO: 15.61 K/UL (ref 2–7.15)
NEUTROPHILS NFR BLD: 85.3 % (ref 44–72)
NITRITE UR QL STRIP.AUTO: NEGATIVE
NRBC # BLD AUTO: 0 K/UL
NRBC BLD-RTO: 0 /100 WBC
NT-PROBNP SERPL IA-MCNC: 38 PG/ML (ref 0–125)
PH UR STRIP.AUTO: 5.5 [PH] (ref 5–8)
PLATELET # BLD AUTO: 270 K/UL (ref 164–446)
PMV BLD AUTO: 9.3 FL (ref 9–12.9)
POTASSIUM SERPL-SCNC: 3.3 MMOL/L (ref 3.6–5.5)
PROT SERPL-MCNC: 7.3 G/DL (ref 6–8.2)
PROT UR QL STRIP: NEGATIVE MG/DL
RBC # BLD AUTO: 4.02 M/UL (ref 4.2–5.4)
RBC UR QL AUTO: NEGATIVE
SODIUM SERPL-SCNC: 134 MMOL/L (ref 135–145)
SP GR UR STRIP.AUTO: 1.02
TROPONIN T SERPL-MCNC: <6 NG/L (ref 6–19)
UROBILINOGEN UR STRIP.AUTO-MCNC: 0.2 MG/DL
WBC # BLD AUTO: 18.3 K/UL (ref 4.8–10.8)

## 2019-08-07 PROCEDURE — 80053 COMPREHEN METABOLIC PANEL: CPT

## 2019-08-07 PROCEDURE — 700117 HCHG RX CONTRAST REV CODE 255: Performed by: EMERGENCY MEDICINE

## 2019-08-07 PROCEDURE — 87086 URINE CULTURE/COLONY COUNT: CPT

## 2019-08-07 PROCEDURE — 71045 X-RAY EXAM CHEST 1 VIEW: CPT

## 2019-08-07 PROCEDURE — 81003 URINALYSIS AUTO W/O SCOPE: CPT

## 2019-08-07 PROCEDURE — 71275 CT ANGIOGRAPHY CHEST: CPT

## 2019-08-07 PROCEDURE — 83880 ASSAY OF NATRIURETIC PEPTIDE: CPT

## 2019-08-07 PROCEDURE — 74177 CT ABD & PELVIS W/CONTRAST: CPT

## 2019-08-07 PROCEDURE — 93005 ELECTROCARDIOGRAM TRACING: CPT | Performed by: EMERGENCY MEDICINE

## 2019-08-07 PROCEDURE — 96376 TX/PRO/DX INJ SAME DRUG ADON: CPT

## 2019-08-07 PROCEDURE — 87040 BLOOD CULTURE FOR BACTERIA: CPT

## 2019-08-07 PROCEDURE — 83605 ASSAY OF LACTIC ACID: CPT

## 2019-08-07 PROCEDURE — 84484 ASSAY OF TROPONIN QUANT: CPT

## 2019-08-07 PROCEDURE — 99285 EMERGENCY DEPT VISIT HI MDM: CPT

## 2019-08-07 PROCEDURE — 96374 THER/PROPH/DIAG INJ IV PUSH: CPT

## 2019-08-07 PROCEDURE — 36415 COLL VENOUS BLD VENIPUNCTURE: CPT

## 2019-08-07 PROCEDURE — 96375 TX/PRO/DX INJ NEW DRUG ADDON: CPT

## 2019-08-07 PROCEDURE — 700111 HCHG RX REV CODE 636 W/ 250 OVERRIDE (IP): Performed by: EMERGENCY MEDICINE

## 2019-08-07 PROCEDURE — 93005 ELECTROCARDIOGRAM TRACING: CPT

## 2019-08-07 PROCEDURE — 85025 COMPLETE CBC W/AUTO DIFF WBC: CPT

## 2019-08-07 RX ORDER — LORAZEPAM 2 MG/1
2 TABLET ORAL EVERY 8 HOURS PRN
Status: SHIPPED | COMMUNITY
End: 2021-10-25

## 2019-08-07 RX ORDER — ONDANSETRON 2 MG/ML
4 INJECTION INTRAMUSCULAR; INTRAVENOUS ONCE
Status: COMPLETED | OUTPATIENT
Start: 2019-08-07 | End: 2019-08-07

## 2019-08-07 RX ADMIN — IOHEXOL 100 ML: 350 INJECTION, SOLUTION INTRAVENOUS at 23:38

## 2019-08-07 RX ADMIN — FENTANYL CITRATE 50 MCG: 50 INJECTION, SOLUTION INTRAMUSCULAR; INTRAVENOUS at 22:18

## 2019-08-07 RX ADMIN — ONDANSETRON 4 MG: 2 INJECTION INTRAMUSCULAR; INTRAVENOUS at 22:18

## 2019-08-07 RX ADMIN — ONDANSETRON 4 MG: 2 INJECTION INTRAMUSCULAR; INTRAVENOUS at 21:18

## 2019-08-07 RX ADMIN — FENTANYL CITRATE 50 MCG: 50 INJECTION, SOLUTION INTRAMUSCULAR; INTRAVENOUS at 21:18

## 2019-08-08 VITALS
RESPIRATION RATE: 19 BRPM | WEIGHT: 128 LBS | SYSTOLIC BLOOD PRESSURE: 94 MMHG | HEIGHT: 62 IN | TEMPERATURE: 100.4 F | DIASTOLIC BLOOD PRESSURE: 57 MMHG | BODY MASS INDEX: 23.55 KG/M2 | OXYGEN SATURATION: 96 % | HEART RATE: 107 BPM

## 2019-08-08 PROCEDURE — 700105 HCHG RX REV CODE 258: Performed by: EMERGENCY MEDICINE

## 2019-08-08 PROCEDURE — 700111 HCHG RX REV CODE 636 W/ 250 OVERRIDE (IP): Performed by: EMERGENCY MEDICINE

## 2019-08-08 PROCEDURE — 96376 TX/PRO/DX INJ SAME DRUG ADON: CPT

## 2019-08-08 RX ORDER — ONDANSETRON 2 MG/ML
4 INJECTION INTRAMUSCULAR; INTRAVENOUS ONCE
Status: COMPLETED | OUTPATIENT
Start: 2019-08-08 | End: 2019-08-08

## 2019-08-08 RX ORDER — SODIUM CHLORIDE 9 MG/ML
1000 INJECTION, SOLUTION INTRAVENOUS ONCE
Status: COMPLETED | OUTPATIENT
Start: 2019-08-08 | End: 2019-08-08

## 2019-08-08 RX ADMIN — FENTANYL CITRATE 50 MCG: 0.05 INJECTION, SOLUTION INTRAMUSCULAR; INTRAVENOUS at 01:15

## 2019-08-08 RX ADMIN — ONDANSETRON 4 MG: 2 INJECTION INTRAMUSCULAR; INTRAVENOUS at 01:15

## 2019-08-08 RX ADMIN — SODIUM CHLORIDE 1000 ML: 9 INJECTION, SOLUTION INTRAVENOUS at 01:14

## 2019-08-08 NOTE — ED NOTES
Pt ambulate to restroom with steady gait. Pt requesting additional pain medication and nausea medication. ERP notified.

## 2019-08-08 NOTE — ED TRIAGE NOTES
"Kathleen Leger Christiano   39 y.o. female   Chief Complaint   Patient presents with   • Chest Pain     started around 1830, says it feels like her whole body hurts and she cannot catch her breath   • Shortness of Breath   • N/V      Pt to triage in  with  for above complaint. Reports she had a breast augmentation two weeks ago but has had absolutely no symptoms since then. Symptoms started suddenly around 1830. They were in a house that is under construction, they report a \"really bad smell that burned our eyes\" then patient began experiencing these symptoms.      Pt is alert and oriented, speaking in full sentences, follows commands and responds appropriately to questions . Resp are even and labored.   Pt placed in senior lounge. Pt educated on triage process. Pt encouraged to alert staff for any changes.    Charge RN aware of patient.   "

## 2019-08-08 NOTE — ED NOTES
Discharge instructions given to patient, a verbal understanding of all instructions was stated. IV removed, cathlon intact, site without s/s of infection. Pt preferred to walk out accompanied by self VSS, all belongings accounted for.

## 2019-08-08 NOTE — ED NOTES
MD aware of patient complaints of abdominal bloating, nausea and pain. Order for CT abdomen in place.

## 2019-08-08 NOTE — ED NOTES
Med rec updated and complete. Allergies reviewed.  Pt denies oral antibiotic use in last 14 days at home.  Home pharmacy [facility]

## 2019-08-08 NOTE — ED PROVIDER NOTES
ED Provider Note    CHIEF COMPLAINT  Chief Complaint   Patient presents with   • Chest Pain     started around 1830, says it feels like her whole body hurts and she cannot catch her breath   • Shortness of Breath   • N/V       HPI  Kathleen Alvarado is a 39 y.o. female who presents with sudden onset chest pain and difficulty with breathing with associated vomiting.  She states that earlier today she was in her usual state of health however suddenly had this violent reaction when she entered a construction site where there was significant amount of dust in the air where drywall work was being performed.  She has never had a reaction like this before in the past.  States that she feels burning sensation when she takes deep breaths.  Had associated episodes of vomiting as well.  No fevers or recent illness.    Patient has had a recent surgery including revision of breast augmentation done 2 weeks ago.  Had a follow-up visit with her surgeon on Friday that was unremarkable.  States that the incision sites are healing well without any redness or swelling or pain.  No dehiscence.  Her next visit with her surgeon is in about 4 weeks.    REVIEW OF SYSTEMS  See HPI for further details. All other systems are negative.     PAST MEDICAL HISTORY   has a past medical history of Allergy, Anesthesia, Anxiety, Bowel habit changes, Cold (05/03/2019), High cholesterol (08/2018), History of anemia, endometriosis, Migraine, Pain (05/2019), Polycystic ovaries, PONV (postoperative nausea and vomiting), Psychiatric problem, Seizure (HCC) (06/2017), Stroke (HCC) (08/2018), and Thyroid condition.    SOCIAL HISTORY  Social History     Tobacco Use   • Smoking status: Never Smoker   • Smokeless tobacco: Never Used   • Tobacco comment: exposed to secondhand smoke in childhood   Substance and Sexual Activity   • Alcohol use: No     Alcohol/week: 0.0 oz   • Drug use: No   • Sexual activity: Yes     Partners: Male       SURGICAL HISTORY   has a  "past surgical history that includes laparoscopy (11/15/2007); tonsillectomy (2000); sandra by laparoscopy (6/6/2009); cholecystoenterostomy (2002); carpal tunnel release (3/24/2009); hysteroscopy with video operative (4/7/08); pelviscopy (9/3/08); lysis adhesions gyn (9/3/08); primary c section (1/13/2011); vaginal hysterectomy total (4/4/2011); cystoscopy (4/4/2011); abdominoplasty (4/28/2011); liposuction (4/28/2011); breast implant revision (10/11/2011); mastopexy (10/11/2011); scar revision (10/11/2011); pelviscopy (4/3/2013); laparoscopy (1/15/2014); laparoscopy robotic xi (9/1/2015); colon resection robotic (Right, 12/7/2015); groin exploration (Left, 8/30/2016); ventral hernia repair (8/30/2016); inguinal hernia repair robotic (Left, 9/27/2017); low anterior resection robotic xi (N/A, 3/14/2018); suspension of breast (Bilateral, 5/13/2019); breast implant revision (Bilateral, 5/13/2019); capsulectomy (Right, 5/13/2019); mammoplasty augmentation (Bilateral, 2000); breast implant revision (Left, 7/24/2019); capsulectomy (N/A, 7/24/2019); and breast reconstruction (N/A, 7/24/2019).    CURRENT MEDICATIONS  Home Medications     Reviewed by Eliu Landa R.N. (Registered Nurse) on 08/07/19 at 2033  Med List Status: <None>   Medication Last Dose Status   docusate sodium (COLACE) 100 MG Cap  Active   estradiol (VIVELLE-DOT) 0.0375 MG/24HR patch  Active   levothyroxine (SYNTHROID) 100 MCG Tab  Active   LORAZEPAM PO  Active                ALLERGIES  Allergies   Allergen Reactions   • Clindamycin Myalgia     Joints swell   • Methylprednisolone      Increased heart rate, into a-fib.   • Morphine Swelling     Swelling; redness   • Norco [Apap-Fd&C Blue #1-Hydrocodone] Rash     Trouble breathing   • Tramadol Palpitations     palpitations   • Demerol Rash     Rash   • Dilaudid [Hydromorphone] Hives and Unspecified     \"Makes my entire body tight\"  Hydrocodone     • Latex Itching     Itch, rash   • Other Misc " "Itching     \"All Metals\" Blisters   • Sulfa Drugs Hives and Rash     Also feel \"high\"   • Tape Rash     Paper tape ok for IVs   • Trazodone Hives     Hives       PHYSICAL EXAM  VITAL SIGNS: /71   Pulse (!) 106   Temp 38 °C (100.4 °F) (Temporal)   Resp 20   Ht 1.575 m (5' 2\")   Wt 58.1 kg (128 lb)   LMP 12/01/2011 (Approximate)   SpO2 98%   BMI 23.41 kg/m²   Pulse ox interpretation: I interpret this pulse ox as normal.  Constitutional: Alert in no apparent distress.  HENT: No signs of trauma, Bilateral external ears normal, Nose normal.   Eyes: Pupils are equal and reactive, Conjunctiva normal, Non-icteric.   Neck: Normal range of motion, No tenderness, Supple, No stridor.   Cardiovascular: Regular rate and rhythm.   Thorax & Lungs: Normal breath sounds, No respiratory distress, No wheezing, No chest tenderness.   Abdomen: Bowel sounds normal, Soft, No tenderness, No masses, No pulsatile masses. No peritoneal signs.  Skin: Warm, Dry, No erythema, No rash.  Bilateral surgical sites in the inframammary crease is healing well with Steri-Strips in place.  No obvious surrounding erythema.  No evidence of discharge or dehiscence or bleeding.  Back: No bony tenderness, No CVA tenderness.   Extremities: Intact distal pulses, No edema, No tenderness, No cyanosis  Musculoskeletal: Good range of motion in all major joints. No tenderness to palpation or major deformities noted.   Neurologic: Alert, Normal motor function and gait, Normal sensory function, No focal deficits noted.       DIAGNOSTIC STUDIES / PROCEDURES    EKG - Physician interpretation  Results for orders placed or performed during the hospital encounter of 08/07/19   EKG (NOW)   Result Value Ref Range    Report       [facility] Emergency Dept.    Test Date:  2019-08-07  Pt Name:    VASILE FONTANA                 Department: ER  MRN:        [id number]                      Room:  Gender:     Female                       Technician: " "[id number]  :        1980                   Requested By:ER TRIAGE PROTOCOL  Order #:    [id number]                    Reading MD: LUCY JORDAN MD    Measurements  Intervals                                Axis  Rate:       100                          P:          70  HI:         156                          QRS:        62  QRSD:       82                           T:          -44  QT:         300  QTc:        387    Interpretive Statements  SINUS TACHYCARDIA  RSR' IN V1 OR V2, RIGHT VCD OR RVH  BORDERLINE T ABNORMALITIES, INFERIOR LEADS  Compared to ECG 2018 13:22:50  Right ventricular hypertrophy now present  T-wave abnormality now present  Sinus rhythm no longer present    Electronically Signed On 2019 21:15:06 PDT by LUCY JORDAN MD           LABS  Labs Reviewed   CBC WITH DIFFERENTIAL - Abnormal; Notable for the following components:       Result Value    WBC 18.3 (*)     RBC 4.02 (*)     MCHC 32.6 (*)     Neutrophils-Polys 85.30 (*)     Lymphocytes 6.90 (*)     Neutrophils (Absolute) 15.61 (*)     Monos (Absolute) 1.19 (*)     All other components within normal limits   COMP METABOLIC PANEL - Abnormal; Notable for the following components:    Sodium 134 (*)     Potassium 3.3 (*)     Co2 14 (*)     Anion Gap 12.0 (*)     Glucose 105 (*)     All other components within normal limits   URINALYSIS - Abnormal; Notable for the following components:    Ketones 15 (*)     All other components within normal limits    Narrative:     Indication for culture:->Emergency Room Patient   LACTIC ACID   LACTIC ACID   URINE CULTURE(NEW)    Narrative:     Indication for culture:->Emergency Room Patient   BLOOD CULTURE    Narrative:     Per Hospital Policy: Only change Specimen Src: to \"Line\" if  specified by physician order.   BLOOD CULTURE    Narrative:     Per Hospital Policy: Only change Specimen Src: to \"Line\" if  specified by physician order.   TROPONIN   PROBRAIN NATRIURETIC PEPTIDE, NT   ESTIMATED GFR "         RADIOLOGY  CT-CTA CHEST PULMONARY ARTERY W/ RECONS   Final Result         No evidence of pulmonary embolism.      Small amount of asymmetric fluid about the left breast implant. This could be related to recent postoperative changes however clinical correlation is recommended.            CT-ABDOMEN-PELVIS WITH   Final Result         1.  Postsurgical changes as detailed.   2.  No acute abnormality. No bowel obstruction.               DX-CHEST-PORTABLE (1 VIEW)   Final Result      No acute cardiopulmonary abnormality.            COURSE & MEDICAL DECISION MAKING    Medications   fentaNYL (SUBLIMAZE) injection 50 mcg (50 mcg Intravenous Given 8/7/19 2118)   ondansetron (ZOFRAN) syringe/vial injection 4 mg (4 mg Intravenous Given 8/7/19 2118)   ondansetron (ZOFRAN) syringe/vial injection 4 mg (4 mg Intravenous Given 8/7/19 2218)   fentaNYL (SUBLIMAZE) injection 50 mcg (50 mcg Intravenous Given 8/7/19 2218)   iohexol (OMNIPAQUE) 350 mg/mL (100 mL Intravenous Given 8/7/19 2338)   NS infusion 1,000 mL (0 mL Intravenous Stopped 8/8/19 0206)   ondansetron (ZOFRAN) syringe/vial injection 4 mg (4 mg Intravenous Given 8/8/19 0115)   fentaNYL (SUBLIMAZE) injection 50 mcg (50 mcg Intravenous Given 8/8/19 0115)       Pertinent Labs & Imaging studies reviewed. (See chart for details)  39 y.o. female presenting after an episode of acute onset chest pain and trouble breathing.  Associated vomiting.  No history of CAD.  Does have recent history of a surgery including breast augmentation revision.  This was done just 2 weeks ago.  There is concern for potential chemical pneumonitis from exposure to dust at the construction site where she had the onset of symptoms.  Also potential PE.  ACS is rather less likely in this case.  No signs of acute ischemia on EKG.  Negative troponin.  Given the patient's recent surgery history, CT pulmonary angiogram was performed for further evaluation.  No evidence of PE.  Patient does have a  history of bowel obstruction and has had vomiting symptoms with continued abdominal discomfort though no signs of peritonitis.  CT abdomen pelvis was ordered in addition.  No evidence of bowel obstruction or other intra-abdominal inflammation.    Laboratory studies did show a leukocytosis of uncertain significance.  This may be a stress response.  The patient presented with a low-grade fever upon arrival.  Blood cultures were ordered.  Tachycardia did respond to IV fluid hydration.  Does have metabolic acidosis as well.  May be related to recent episodes of vomiting.  Was given IV fluid hydration to address this.    Chest pain did improve throughout the patient's stay here in the emergency department.  Was treated with IV analgesic medications.  Had a rather thorough work-up here today with no clear cause of her symptoms today.  I do suspect chemical pneumonitis as a possible cause of her cough and shortness of breath and chest pain initially.  May have resulted in vomiting episodes.  No evidence of pneumonia or other obvious infectious etiology.  Had some fluid around the left breast augmentation site though no obvious signs of infection.    After a rather thorough examination, no clear identified source of infection was identified.  Patient was informed of the results and the diagnostic uncertainty.  I do not believe there is an indication for empiric antibiotics at this time.  Patient was encouraged to monitor her symptoms closely and to return immediately for any worsening of her symptoms or development of any other concerning signs or symptoms.    Patient appears stable for discharge.  She was encouraged to return immediately for any worsening of her symptoms or development of any other concerning signs or symptoms.  Otherwise to follow-up with her primary care physician and her plastic surgeon for further management.    The patient was instructed to follow-up with primary care physician for further management.  " To return immediately for any worsening symptoms or development of any other concerning signs or symptoms. The patient verbalizes understanding in their own words.    BP (!) 99/51   Pulse 92   Temp 37.8 °C (100 °F) (Temporal)   Resp 19   Ht 1.575 m (5' 2\")   Wt 58.1 kg (128 lb)   LMP 12/01/2011 (Approximate)   SpO2 97%   BMI 23.41 kg/m²     The patient was referred to primary care where they will receive further BP management.      Cathy Kang, JACQUELINEP.R.N.  [address] [facility] [address]  [phone]    Schedule an appointment as soon as possible for a visit       [facility], Emergency Dept  [address] [place] [id number] [phone]    As needed, If symptoms worsen      FINAL IMPRESSION  1. Chest pain, unspecified type            Electronically signed by: Maurice Higginbotham, 8/7/2019 9:02 PM    "

## 2019-08-10 LAB
BACTERIA UR CULT: NORMAL
SIGNIFICANT IND 70042: NORMAL
SITE SITE: NORMAL
SOURCE SOURCE: NORMAL

## 2019-08-12 LAB
BACTERIA BLD CULT: NORMAL
BACTERIA BLD CULT: NORMAL
SIGNIFICANT IND 70042: NORMAL
SIGNIFICANT IND 70042: NORMAL
SITE SITE: NORMAL
SITE SITE: NORMAL
SOURCE SOURCE: NORMAL
SOURCE SOURCE: NORMAL

## 2019-08-14 ENCOUNTER — HOSPITAL ENCOUNTER (OUTPATIENT)
Dept: RADIOLOGY | Facility: MEDICAL CENTER | Age: 39
End: 2019-08-14
Attending: SPECIALIST
Payer: COMMERCIAL

## 2019-08-14 DIAGNOSIS — S20.02XA CONTUSION OF LEFT BREAST, INITIAL ENCOUNTER: ICD-10-CM

## 2019-08-14 DIAGNOSIS — N64.89 OTHER SPECIFIED DISORDERS OF BREAST: ICD-10-CM

## 2019-08-14 PROCEDURE — 71260 CT THORAX DX C+: CPT

## 2019-08-14 PROCEDURE — 700117 HCHG RX CONTRAST REV CODE 255: Performed by: SPECIALIST

## 2019-08-14 RX ADMIN — IOHEXOL 75 ML: 350 INJECTION, SOLUTION INTRAVENOUS at 18:41

## 2019-11-18 ENCOUNTER — HOSPITAL ENCOUNTER (OUTPATIENT)
Dept: RADIOLOGY | Facility: MEDICAL CENTER | Age: 39
End: 2019-11-18
Attending: NURSE PRACTITIONER
Payer: COMMERCIAL

## 2019-11-18 DIAGNOSIS — M06.869: ICD-10-CM

## 2019-11-18 DIAGNOSIS — M06.842: ICD-10-CM

## 2019-11-18 DIAGNOSIS — M06.841: ICD-10-CM

## 2019-11-18 PROCEDURE — 73565 X-RAY EXAM OF KNEES: CPT

## 2019-11-18 PROCEDURE — 77077 JOINT SURVEY SINGLE VIEW: CPT

## 2020-01-31 ENCOUNTER — HOSPITAL ENCOUNTER (OUTPATIENT)
Dept: LAB | Facility: MEDICAL CENTER | Age: 40
End: 2020-01-31
Attending: OTOLARYNGOLOGY
Payer: COMMERCIAL

## 2020-01-31 LAB
GRAM STN SPEC: NORMAL
GRAM STN SPEC: NORMAL
SIGNIFICANT IND 70042: NORMAL
SIGNIFICANT IND 70042: NORMAL
SITE SITE: NORMAL
SITE SITE: NORMAL
SOURCE SOURCE: NORMAL
SOURCE SOURCE: NORMAL

## 2020-01-31 PROCEDURE — 87075 CULTR BACTERIA EXCEPT BLOOD: CPT

## 2020-01-31 PROCEDURE — 87205 SMEAR GRAM STAIN: CPT

## 2020-01-31 PROCEDURE — 87070 CULTURE OTHR SPECIMN AEROBIC: CPT | Mod: 91

## 2020-02-02 LAB
BACTERIA WND AEROBE CULT: NORMAL
BACTERIA WND AEROBE CULT: NORMAL
GRAM STN SPEC: NORMAL
GRAM STN SPEC: NORMAL
SIGNIFICANT IND 70042: NORMAL
SIGNIFICANT IND 70042: NORMAL
SITE SITE: NORMAL
SITE SITE: NORMAL
SOURCE SOURCE: NORMAL
SOURCE SOURCE: NORMAL

## 2020-02-03 LAB
BACTERIA SPEC ANAEROBE CULT: NORMAL
BACTERIA SPEC ANAEROBE CULT: NORMAL
SIGNIFICANT IND 70042: NORMAL
SIGNIFICANT IND 70042: NORMAL
SITE SITE: NORMAL
SITE SITE: NORMAL
SOURCE SOURCE: NORMAL
SOURCE SOURCE: NORMAL

## 2020-04-22 ENCOUNTER — APPOINTMENT (OUTPATIENT)
Dept: RADIOLOGY | Facility: MEDICAL CENTER | Age: 40
End: 2020-04-22
Attending: EMERGENCY MEDICINE
Payer: COMMERCIAL

## 2020-04-22 ENCOUNTER — HOSPITAL ENCOUNTER (EMERGENCY)
Facility: MEDICAL CENTER | Age: 40
End: 2020-04-22
Attending: EMERGENCY MEDICINE
Payer: COMMERCIAL

## 2020-04-22 VITALS
WEIGHT: 131 LBS | RESPIRATION RATE: 13 BRPM | SYSTOLIC BLOOD PRESSURE: 115 MMHG | OXYGEN SATURATION: 99 % | TEMPERATURE: 97.3 F | HEIGHT: 62 IN | HEART RATE: 73 BPM | DIASTOLIC BLOOD PRESSURE: 83 MMHG | BODY MASS INDEX: 24.11 KG/M2

## 2020-04-22 DIAGNOSIS — R53.1 RIGHT SIDED WEAKNESS: ICD-10-CM

## 2020-04-22 DIAGNOSIS — R47.02 DYSPHASIA: ICD-10-CM

## 2020-04-22 DIAGNOSIS — R29.818 TRANSIENT NEUROLOGIC DEFICIT: Primary | ICD-10-CM

## 2020-04-22 DIAGNOSIS — R20.0 NUMBNESS: ICD-10-CM

## 2020-04-22 DIAGNOSIS — R10.9 ABDOMINAL PAIN, UNSPECIFIED ABDOMINAL LOCATION: ICD-10-CM

## 2020-04-22 LAB
ABO GROUP BLD: NORMAL
ALBUMIN SERPL BCP-MCNC: 4.5 G/DL (ref 3.2–4.9)
ALBUMIN/GLOB SERPL: 1.9 G/DL
ALP SERPL-CCNC: 56 U/L (ref 30–99)
ALT SERPL-CCNC: 50 U/L (ref 2–50)
AMPHET UR QL SCN: NEGATIVE
ANION GAP SERPL CALC-SCNC: 12 MMOL/L (ref 7–16)
APPEARANCE UR: CLEAR
APTT PPP: 29.2 SEC (ref 24.7–36)
AST SERPL-CCNC: 31 U/L (ref 12–45)
BARBITURATES UR QL SCN: NEGATIVE
BASOPHILS # BLD AUTO: 0.6 % (ref 0–1.8)
BASOPHILS # BLD: 0.04 K/UL (ref 0–0.12)
BENZODIAZ UR QL SCN: NEGATIVE
BILIRUB SERPL-MCNC: 0.3 MG/DL (ref 0.1–1.5)
BILIRUB UR QL STRIP.AUTO: NEGATIVE
BLD GP AB SCN SERPL QL: NORMAL
BUN SERPL-MCNC: 9 MG/DL (ref 8–22)
BZE UR QL SCN: NEGATIVE
CALCIUM SERPL-MCNC: 9.5 MG/DL (ref 8.5–10.5)
CANNABINOIDS UR QL SCN: NEGATIVE
CHLORIDE SERPL-SCNC: 99 MMOL/L (ref 96–112)
CO2 SERPL-SCNC: 25 MMOL/L (ref 20–33)
COLOR UR: YELLOW
CREAT SERPL-MCNC: 0.92 MG/DL (ref 0.5–1.4)
EKG IMPRESSION: NORMAL
EOSINOPHIL # BLD AUTO: 0.01 K/UL (ref 0–0.51)
EOSINOPHIL NFR BLD: 0.1 % (ref 0–6.9)
ERYTHROCYTE [DISTWIDTH] IN BLOOD BY AUTOMATED COUNT: 47.7 FL (ref 35.9–50)
GLOBULIN SER CALC-MCNC: 2.4 G/DL (ref 1.9–3.5)
GLUCOSE BLD-MCNC: 96 MG/DL (ref 65–99)
GLUCOSE SERPL-MCNC: 105 MG/DL (ref 65–99)
GLUCOSE UR STRIP.AUTO-MCNC: NEGATIVE MG/DL
HCT VFR BLD AUTO: 39.7 % (ref 37–47)
HGB BLD-MCNC: 13 G/DL (ref 12–16)
IMM GRANULOCYTES # BLD AUTO: 0.05 K/UL (ref 0–0.11)
IMM GRANULOCYTES NFR BLD AUTO: 0.7 % (ref 0–0.9)
INR PPP: 0.79 (ref 0.87–1.13)
KETONES UR STRIP.AUTO-MCNC: NEGATIVE MG/DL
LEUKOCYTE ESTERASE UR QL STRIP.AUTO: NEGATIVE
LYMPHOCYTES # BLD AUTO: 1.1 K/UL (ref 1–4.8)
LYMPHOCYTES NFR BLD: 15.4 % (ref 22–41)
MCH RBC QN AUTO: 31.9 PG (ref 27–33)
MCHC RBC AUTO-ENTMCNC: 32.7 G/DL (ref 33.6–35)
MCV RBC AUTO: 97.5 FL (ref 81.4–97.8)
METHADONE UR QL SCN: NEGATIVE
MICRO URNS: NORMAL
MONOCYTES # BLD AUTO: 0.24 K/UL (ref 0–0.85)
MONOCYTES NFR BLD AUTO: 3.4 % (ref 0–13.4)
NEUTROPHILS # BLD AUTO: 5.71 K/UL (ref 2–7.15)
NEUTROPHILS NFR BLD: 79.8 % (ref 44–72)
NITRITE UR QL STRIP.AUTO: NEGATIVE
NRBC # BLD AUTO: 0 K/UL
NRBC BLD-RTO: 0 /100 WBC
OPIATES UR QL SCN: NEGATIVE
OXYCODONE UR QL SCN: NEGATIVE
PCP UR QL SCN: NEGATIVE
PH UR STRIP.AUTO: 7.5 [PH] (ref 5–8)
PLATELET # BLD AUTO: 344 K/UL (ref 164–446)
PMV BLD AUTO: 8.8 FL (ref 9–12.9)
POTASSIUM SERPL-SCNC: 4.4 MMOL/L (ref 3.6–5.5)
PROPOXYPH UR QL SCN: NEGATIVE
PROT SERPL-MCNC: 6.9 G/DL (ref 6–8.2)
PROT UR QL STRIP: NEGATIVE MG/DL
PROTHROMBIN TIME: 11.1 SEC (ref 12–14.6)
RBC # BLD AUTO: 4.07 M/UL (ref 4.2–5.4)
RBC UR QL AUTO: NEGATIVE
RH BLD: NORMAL
SODIUM SERPL-SCNC: 136 MMOL/L (ref 135–145)
SP GR UR STRIP.AUTO: 1.02
T4 FREE SERPL-MCNC: 1.39 NG/DL (ref 0.53–1.43)
TROPONIN T SERPL-MCNC: <6 NG/L (ref 6–19)
TSH SERPL DL<=0.005 MIU/L-ACNC: 0.14 UIU/ML (ref 0.38–5.33)
UROBILINOGEN UR STRIP.AUTO-MCNC: 0.2 MG/DL
WBC # BLD AUTO: 7.2 K/UL (ref 4.8–10.8)

## 2020-04-22 PROCEDURE — 82962 GLUCOSE BLOOD TEST: CPT

## 2020-04-22 PROCEDURE — 85730 THROMBOPLASTIN TIME PARTIAL: CPT

## 2020-04-22 PROCEDURE — 71045 X-RAY EXAM CHEST 1 VIEW: CPT

## 2020-04-22 PROCEDURE — 99285 EMERGENCY DEPT VISIT HI MDM: CPT

## 2020-04-22 PROCEDURE — 81003 URINALYSIS AUTO W/O SCOPE: CPT

## 2020-04-22 PROCEDURE — 80053 COMPREHEN METABOLIC PANEL: CPT

## 2020-04-22 PROCEDURE — 86900 BLOOD TYPING SEROLOGIC ABO: CPT

## 2020-04-22 PROCEDURE — 70496 CT ANGIOGRAPHY HEAD: CPT

## 2020-04-22 PROCEDURE — 84443 ASSAY THYROID STIM HORMONE: CPT

## 2020-04-22 PROCEDURE — 0042T CT-CEREBRAL PERFUSION ANALYSIS: CPT

## 2020-04-22 PROCEDURE — 700117 HCHG RX CONTRAST REV CODE 255: Performed by: EMERGENCY MEDICINE

## 2020-04-22 PROCEDURE — 85610 PROTHROMBIN TIME: CPT

## 2020-04-22 PROCEDURE — 84439 ASSAY OF FREE THYROXINE: CPT

## 2020-04-22 PROCEDURE — 80307 DRUG TEST PRSMV CHEM ANLYZR: CPT

## 2020-04-22 PROCEDURE — 70498 CT ANGIOGRAPHY NECK: CPT

## 2020-04-22 PROCEDURE — 70450 CT HEAD/BRAIN W/O DYE: CPT

## 2020-04-22 PROCEDURE — 86850 RBC ANTIBODY SCREEN: CPT

## 2020-04-22 PROCEDURE — 93005 ELECTROCARDIOGRAM TRACING: CPT | Performed by: EMERGENCY MEDICINE

## 2020-04-22 PROCEDURE — 85025 COMPLETE CBC W/AUTO DIFF WBC: CPT

## 2020-04-22 PROCEDURE — 86901 BLOOD TYPING SEROLOGIC RH(D): CPT

## 2020-04-22 PROCEDURE — 84484 ASSAY OF TROPONIN QUANT: CPT

## 2020-04-22 PROCEDURE — 74175 CTA ABDOMEN W/CONTRAST: CPT

## 2020-04-22 RX ADMIN — IOHEXOL 40 ML: 350 INJECTION, SOLUTION INTRAVENOUS at 13:35

## 2020-04-22 RX ADMIN — IOHEXOL 60 ML: 350 INJECTION, SOLUTION INTRAVENOUS at 13:33

## 2020-04-22 RX ADMIN — IOHEXOL 60 ML: 350 INJECTION, SOLUTION INTRAVENOUS at 13:38

## 2020-04-22 ASSESSMENT — FIBROSIS 4 INDEX: FIB4 SCORE: 0.64

## 2020-04-22 NOTE — ED NOTES
This RN received hand off report on patient from JERROD Mancia.  This is this RN's first interaction with this pt.  This RN's primary care of patient began at this time

## 2020-04-22 NOTE — ED TRIAGE NOTES
"Chief Complaint   Patient presents with   • Possible Stroke     noticed right sided weakness/numbness and slurred speech at 1730pm yesterday then symptoms got worse 45 minutes ago. pt has of Stroke w/right sided deficit. states weakness and speech problem is worse than normal.      Also c/o pain in rlq abdomen/edema in rle and feeling \"tired and forgetful\". Charge made aware. Taken to charge for Stroke IR.  "

## 2020-04-22 NOTE — ED NOTES
Pt is now able to lift right leg and hold with a slight drift. Pt is able to speak normally with stronger  than before on right side.

## 2020-04-22 NOTE — CONSULTS
Called by Dr. Caldwell    41 y/o female w/ hx of multiple abdominal surgeries and chronic abdominal pain presenting to E.D. for R sided numbness/weakness and some difficulty w/ speech now resolved. CT Head, CTA Head/Neck and CT perfusion all normal. No headache associated w/ event. Patient had reportedly stacatto/stuttering speech with the event. Review of records shows 2 previously normal EEGs in 2018. She was also evaluated in 2008 by Dr. Cooney for similar symptoms (R facial numbness, slurred speech, jerking of the R arm) which at that time were thought to potentially be partial seizures however this was not proven. She is currently not on any antiepileptic medications. Given that the patient has been having events similar to this over the past 10 years, and given that her symptoms have resolved as well as her reassuring imaging she may be followed up outpatient. Should symptoms return or worsen she may require admission for further workup. I will attempt to organize/request prompt outpatient followup.

## 2020-04-22 NOTE — ED NOTES
Assist RN: Pt discharge to home.  Pt provided with discharge instructions.  Pt verbalized understanding, all questions answered.  VSS upon DC. Pt steady on feet upon DC.

## 2020-04-22 NOTE — DISCHARGE INSTRUCTIONS
Return to the emergency department if you have severe weakness, headache, vision changes, nausea, vomiting, fever.  You will be receiving a call from [facility] neurology group if not please call them for further evaluation, consultation.

## 2020-04-22 NOTE — ED PROVIDER NOTES
ED Provider Note    CHIEF COMPLAINT  Chief Complaint   Patient presents with   • Possible Stroke     noticed right sided weakness/numbness and slurred speech at 1730pm yesterday then symptoms got worse 45 minutes ago. pt has of Stroke w/right sided deficit. states weakness and speech problem is worse than normal.        LEROY Alvarado is a 40 y.o. female who presents to the emergency department stating that yesterday evening at 5:30 PM she started having right-sided weakness, sensation change in the right side of her face as well as her arm and leg.  Then approximately 45 minutes prior to arrival, she had severe difficulty speaking, severe weakness to right upper and lower extremities and sensation changes.  In addition, she complains of slight right lateral abdominal discomfort that is dull in nature.  She also states that her toes are purple and she is bruising and she had a severe abdominal pain.  The patient's had a CVA in 2018 with a negative MRI, negative EEG, she had symptoms similar to this in 2008 with a negative EEG as well on MRI.  The patient has been seen by neurology for this and has an unknown diagnosis.  The patient currently states that she does not have a headache, vision changes, chest pain, fever, shakes, chills, sweats, nausea or vomiting.  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  Denies drug use, recent anxiety or stress.    REVIEW OF SYSTEMS  Positives as above. Pertinent negatives include fever, cough, nausea, vomiting, abdominal pain, she has had a hysterectomy in the past.  All other review of systems are negative    PAST MEDICAL HISTORY  Past Medical History:   Diagnosis Date   • Allergy     Seasonal   • Anesthesia     nausea/vomiting;    • Anxiety    • Bowel habit changes     constipation. 7/22/19-resolved after colon surgery.   • Cold 05/03/2019    Sinus infection, beginning of April, better now, denies SOB, productive cough  "  • High cholesterol 08/2018    History of, related colon surgery-no meds   • History of anemia    • Hx of endometriosis    • Migraine    • Pain 05/2019    stomach, lower back   • Polycystic ovaries     abn menses, vag bleeding x 2 months- has now had hysterectomy   • PONV (postoperative nausea and vomiting)    • Psychiatric problem     depression, anxiety   • Seizure (HCC) 06/2017    d/t \"thyroid level being off\"   • Stroke (HCC) 08/2018    Slight right sided weakness.  (Stroke related to colon surgery)-7/22/19-RESOLVED.    • Thyroid condition     hypothyroid       FAMILY HISTORY  Noncontributory    SOCIAL HISTORY  Social History     Socioeconomic History   • Marital status: Single     Spouse name: Not on file   • Number of children: Not on file   • Years of education: Not on file   • Highest education level: Not on file   Occupational History   • Not on file   Social Needs   • Financial resource strain: Not on file   • Food insecurity     Worry: Not on file     Inability: Not on file   • Transportation needs     Medical: Not on file     Non-medical: Not on file   Tobacco Use   • Smoking status: Never Smoker   • Smokeless tobacco: Never Used   • Tobacco comment: exposed to secondhand smoke in childhood   Substance and Sexual Activity   • Alcohol use: No     Alcohol/week: 0.0 oz   • Drug use: No   • Sexual activity: Yes     Partners: Male   Lifestyle   • Physical activity     Days per week: Not on file     Minutes per session: Not on file   • Stress: Not on file   Relationships   • Social connections     Talks on phone: Not on file     Gets together: Not on file     Attends [employer] service: Not on file     Active member of club or organization: Not on file     Attends meetings of clubs or organizations: Not on file     Relationship status: Not on file   • Intimate partner violence     Fear of current or ex partner: Not on file     Emotionally abused: Not on file     Physically abused: Not on file     Forced sexual " activity: Not on file   Other Topics Concern   • Not on file   Social History Narrative   • Not on file       SURGICAL HISTORY  Past Surgical History:   Procedure Laterality Date   • BREAST IMPLANT REVISION Left 7/24/2019    Procedure: INSERTION, IMPLANT, BREAST- EXCHANGE SILICONE;  Surgeon: Bg Dumont M.D.;  Location: [facility];  Service: Plastics   • CAPSULECTOMY N/A 7/24/2019    Procedure: CAPSULECTOMY;  Surgeon: Bg Dumont M.D.;  Location: [facility];  Service: Plastics   • BREAST RECONSTRUCTION N/A 7/24/2019    Procedure: RECONSTRUCTION, BREAST - PLACEMENT OF STRATTICE, REVISION OF RIGHT FULL BREAST LIFT AND STRACTTICE REINFORCEMENT;  Surgeon: Bg Dumont M.D.;  Location: [facility];  Service: Plastics   • PB SUSPENSION OF BREAST Bilateral 5/13/2019    Procedure: MASTOPEXY - LOWER W/REPOSITIONING OF LATERAL FOLDS;  Surgeon: Bg Dumont M.D.;  Location: [facility];  Service: Plastics   • BREAST IMPLANT REVISION Bilateral 5/13/2019    Procedure: INSERTION, IMPLANT, BREAST - EXCHANGE SILICONE;  Surgeon: Bg Dumont M.D.;  Location: [facility];  Service: Plastics   • CAPSULECTOMY Right 5/13/2019    Procedure: CAPSULECTOMY;  Surgeon: Bg Dumont M.D.;  Location: [facility];  Service: Plastics   • LOW ANTERIOR RESECTION ROBOTIC XI N/A 3/14/2018    Procedure: LOW ANTERIOR RESECTION ROBOTIC XI- FOR SUBTOTAL COLECTOMY W/LOW ANTERIOR ANASTOMOSIS;  Surgeon: Tigre Javed M.D.;  Location: [facility];  Service: General   • INGUINAL HERNIA REPAIR ROBOTIC Left 9/27/2017    Procedure: INGUINAL HERNIA REPAIR ROBOTIC RECURRENT;  Surgeon: Tigre Javed M.D.;  Location: [facility];  Service: Gen Robotic   • GROIN EXPLORATION Left 8/30/2016    Procedure: GROIN EXPLORATION;  Surgeon: Tigre Javed M.D.;  Location: [facility];  Service:    • VENTRAL HERNIA REPAIR  8/30/2016     Procedure: VENTRAL HERNIA REPAIR ;  Surgeon: Tigre Javed M.D.;  Location: SURGERY [facility];  Service:    • COLON RESECTION ROBOTIC Right 12/7/2015    Procedure: COLON RESECTION ROBOTIC for: Hemicolectomy;  Surgeon: Tigre Javed M.D.;  Location: SURGERY [facility];  Service:    • LAPAROSCOPY ROBOTIC XI  9/1/2015    Procedure: LAPAROSCOPY ROBOTIC XI FOR: ENTEROLYSIS AND METAL CLIP REMOVAL;  Surgeon: Tigre Javed M.D.;  Location: SURGERY [facility];  Service:    • LAPAROSCOPY  1/15/2014    Performed by Heron Reyes M.D. at SURGERY [facility]   • PELVISCOPY  4/3/2013    Performed by Genaro Hadley M.D. at SURGERY SAME DAY [facility] ORS   • BREAST IMPLANT REVISION  10/11/2011    Performed by ANA ROBERTS at [facility]   • MASTOPEXY  10/11/2011    Performed by ANA ROBERTS at [facility]   • SCAR REVISION  10/11/2011    Performed by ANA ROBERTS at [facility]   • ABDOMINOPLASTY  4/28/2011    Performed by ANA ROBERTS at [facility]   • LIPOSUCTION  4/28/2011    Performed by ANA ROBERTS at [facility]   • VAGINAL HYSTERECTOMY TOTAL  4/4/2011    Performed by GENARO HADLEY at SURGERY SAME DAY [facility] ORS   • CYSTOSCOPY  4/4/2011    Performed by GENARO HADLEY at SURGERY SAME DAY [facility] ORS   • PRIMARY C SECTION  1/13/2011    Performed by VIET CISNEROS at LABOR AND DELIVERY   • ANA BY LAPAROSCOPY  6/6/2009    Performed by LOUIS FREEMAN at SURGERY [facility] ORS   • CARPAL TUNNEL RELEASE  3/24/2009    Performed by MARSHA ALCANTARA at SURGERY SAME DAY [facility] ORS   • PELVISCOPY  9/3/08    Performed by TAWNY TOBAR at SURGERY SAME DAY [facility] ORS   • LYSIS ADHESIONS GYN  9/3/08    Performed by TAWNY TOBAR at SURGERY SAME DAY [facility] ORS   • HYSTEROSCOPY WITH VIDEO OPERATIVE  4/7/08    Performed by TAWNY TOBAR at SURGERY [facility] ORS   • LAPAROSCOPY  11/15/2007    at  "[facility] out pt clinic seven laparoscopies (for enhdometriosis)   • PB CHOLECYSTOENTEROSTOMY  2002   • TONSILLECTOMY  2000   • MAMMOPLASTY AUGMENTATION Bilateral 2000   • HYSTERECTOMY, TOTAL ABDOMINAL         CURRENT MEDICATIONS  Home Medications     Reviewed by Kassy Etienne R.N. (Registered Nurse) on 04/22/20 at 1259  Med List Status: <None>   Medication Last Dose Status   docusate sodium (COLACE) 100 MG Cap  Active   estradiol (VIVELLE-DOT) 0.0375 MG/24HR patch  Active   levothyroxine (SYNTHROID) 100 MCG Tab  Active   LORazepam (ATIVAN) 2 MG tablet  Active                ALLERGIES  Allergies   Allergen Reactions   • Clindamycin Myalgia     Joints swell   • Methylprednisolone      Increased heart rate, into a-fib.   • Morphine Swelling     Swelling; redness   • Norco [Apap-Fd&C Blue #1-Hydrocodone] Rash     Trouble breathing   • Tramadol Palpitations     palpitations   • Demerol Rash     Rash   • Dilaudid [Hydromorphone] Hives and Unspecified     \"Makes my entire body tight\"  Hydrocodone     • Latex Itching     Itch, rash   • Other Misc Itching     \"All Metals\" Blisters   • Sulfa Drugs Hives and Rash     Also feel \"high\"   • Tape Rash     Paper tape ok for IVs   • Trazodone Hives     Hives       PHYSICAL EXAM  VITAL SIGNS: /83   Pulse 73   Temp 36.3 °C (97.3 °F) (Temporal)   Resp 13   Ht 1.575 m (5' 2\")   Wt 59.4 kg (131 lb)   LMP 12/01/2011 (Approximate)   SpO2 99%   BMI 23.96 kg/m²    Constitutional: Well developed, Well nourished, mild acute distress, Non-toxic appearance.   Eyes: PERRLA, EOMI, Conjunctiva normal, No discharge.   Cardiovascular: Normal heart rate, Normal rhythm, No murmurs, No rubs, No gallops, and intact distal pulses.   Thorax & Lungs:  No respiratory distress, no rales, no rhonchi, No wheezing, No chest wall tenderness.   Abdomen: Bowel sounds normal, Soft, surgical scars with slight diffuse abdominal tenderness, No guarding, No rebound, No pulsatile masses.   Skin: Warm, Dry, No " erythema, No rash.   Extremities: Full range of motion, no deformity, no edema.  Neurologic: Paresthesias of the right side of her face, right upper and lower extremity, pronator drift right upper extremity, strength is 4/5 right upper and right lower extremity, cranial 2 through 12 are intact except for sensation changes on the right side of her face, negative finger-nose bilaterally, slight dysarthria but no expressive aphasia, alert and oriented x3, GCS 15, NIH score of 4  Psychiatric: Anxious affect      RADIOLOGY/PROCEDURES  DX-CHEST-PORTABLE (1 VIEW)   Final Result      No acute cardiac or pulmonary abnormality is noted.      CT-CTA COMPLETE THORACOABDOMINAL AORTA   Final Result      1.  No evidence of acute traumatic aortic injury or hemodynamically significant vascular lesion   2.  Mildly dilated loop of jejunum, likely related to peristaltic activity though enteritis could create a similar appearance.            CT-CEREBRAL PERFUSION ANALYSIS   Final Result      1.  Cerebral blood flow less than 30% likely representing completed infarct = 0 mL.      2.  T Max more than 6 seconds likely representing combination of completed infarct and ischemia = 0 mL.      3.  Mismatched volume likely representing ischemic brain/penumbra = 0 mL.      4.  Please note that the cerebral perfusion was performed on the limited brain tissue around the basal ganglia region. Infarct/ischemia outside the CT perfusion sections can be missed in this study.      CT-CTA NECK WITH & W/O-POST PROCESSING   Final Result      No high-grade stenosis, large vessel occlusion, aneurysm or dissection.      CT-CTA HEAD WITH & W/O-POST PROCESS   Final Result      No large vessel occlusion, high-grade stenosis or aneurysm of the [place].      CT-HEAD W/O   Final Result      No CT evidence of acute infarct, hemorrhage or mass.        Results for orders placed or performed during the hospital encounter of 04/22/20   CBC WITH DIFFERENTIAL    Result Value Ref Range    WBC 7.2 4.8 - 10.8 K/uL    RBC 4.07 (L) 4.20 - 5.40 M/uL    Hemoglobin 13.0 12.0 - 16.0 g/dL    Hematocrit 39.7 37.0 - 47.0 %    MCV 97.5 81.4 - 97.8 fL    MCH 31.9 27.0 - 33.0 pg    MCHC 32.7 (L) 33.6 - 35.0 g/dL    RDW 47.7 35.9 - 50.0 fL    Platelet Count 344 164 - 446 K/uL    MPV 8.8 (L) 9.0 - 12.9 fL    Neutrophils-Polys 79.80 (H) 44.00 - 72.00 %    Lymphocytes 15.40 (L) 22.00 - 41.00 %    Monocytes 3.40 0.00 - 13.40 %    Eosinophils 0.10 0.00 - 6.90 %    Basophils 0.60 0.00 - 1.80 %    Immature Granulocytes 0.70 0.00 - 0.90 %    Nucleated RBC 0.00 /100 WBC    Neutrophils (Absolute) 5.71 2.00 - 7.15 K/uL    Lymphs (Absolute) 1.10 1.00 - 4.80 K/uL    Monos (Absolute) 0.24 0.00 - 0.85 K/uL    Eos (Absolute) 0.01 0.00 - 0.51 K/uL    Baso (Absolute) 0.04 0.00 - 0.12 K/uL    Immature Granulocytes (abs) 0.05 0.00 - 0.11 K/uL    NRBC (Absolute) 0.00 K/uL   COMP METABOLIC PANEL   Result Value Ref Range    Sodium 136 135 - 145 mmol/L    Potassium 4.4 3.6 - 5.5 mmol/L    Chloride 99 96 - 112 mmol/L    Co2 25 20 - 33 mmol/L    Anion Gap 12.0 7.0 - 16.0    Glucose 105 (H) 65 - 99 mg/dL    Bun 9 8 - 22 mg/dL    Creatinine 0.92 0.50 - 1.40 mg/dL    Calcium 9.5 8.5 - 10.5 mg/dL    AST(SGOT) 31 12 - 45 U/L    ALT(SGPT) 50 2 - 50 U/L    Alkaline Phosphatase 56 30 - 99 U/L    Total Bilirubin 0.3 0.1 - 1.5 mg/dL    Albumin 4.5 3.2 - 4.9 g/dL    Total Protein 6.9 6.0 - 8.2 g/dL    Globulin 2.4 1.9 - 3.5 g/dL    A-G Ratio 1.9 g/dL   PROTHROMBIN TIME   Result Value Ref Range    PT 11.1 (L) 12.0 - 14.6 sec    INR 0.79 (L) 0.87 - 1.13   APTT   Result Value Ref Range    APTT 29.2 24.7 - 36.0 sec   COD (ADULT)   Result Value Ref Range    ABO Grouping Only A     Rh Grouping Only POS     Antibody Screen-Cod NEG    TROPONIN   Result Value Ref Range    Troponin T <6 6 - 19 ng/L   URINALYSIS,CULTURE IF INDICATED   Result Value Ref Range    Color Yellow     Character Clear     Specific Gravity 1.021 <1.035    Ph  7.5 5.0 - 8.0    Glucose Negative Negative mg/dL    Ketones Negative Negative mg/dL    Protein Negative Negative mg/dL    Bilirubin Negative Negative    Urobilinogen, Urine 0.2 Negative    Nitrite Negative Negative    Leukocyte Esterase Negative Negative    Occult Blood Negative Negative    Micro Urine Req see below    URINE DRUG SCREEN   Result Value Ref Range    Amphetamines Urine Negative Negative    Barbiturates Negative Negative    Benzodiazepines Negative Negative    Cocaine Metabolite Negative Negative    Methadone Negative Negative    Opiates Negative Negative    Oxycodone Negative Negative    Phencyclidine -Pcp Negative Negative    Propoxyphene Negative Negative    Cannabinoid Metab Negative Negative   ESTIMATED GFR   Result Value Ref Range    GFR If African American >60 >60 mL/min/1.73 m 2    GFR If Non African American >60 >60 mL/min/1.73 m 2   TSH   Result Value Ref Range    TSH 0.137 (L) 0.380 - 5.330 uIU/mL   FREE THYROXINE   Result Value Ref Range    Free T-4 1.39 0.53 - 1.43 ng/dL   ACCU-CHEK GLUCOSE   Result Value Ref Range    Glucose - Accu-Ck 96 65 - 99 mg/dL   EKG (NOW)   Result Value Ref Range    Report       [facility] Emergency Dept.    Test Date:  2020  Pt Name:    VASILE FONTANA                 Department: ER  MRN:        [id number]                      Room:        11  Gender:     Female                       Technician: [id number]  :        1980                   Requested By:MAK DAVIDSON  Order #:    [id number]                    Clementine MD: MAK DAVIDSON, DO    Measurements  Intervals                                Axis  Rate:       89                           P:          -28  WA:         132                          QRS:        62  QRSD:       74                           T:          19  QT:         356  QTc:        434    Interpretive Statements  SINUS RHYTHM  Compared to ECG 2019 20:16:05  Sinus tachycardia no longer present  Right  ventricular hypertrophy no longer present  T-wave abnormality no longer present  Electronically Signed On 4- 17:27:25 PDT by MAK DAVIDSON DO           COURSE & MEDICAL DECISION MAKING  Pertinent Labs & Imaging studies reviewed. (See chart for details)  This is a pleasant 40-year-old female presents with right-sided paresthesia and weakness as well as dysarthria.  She had a very atypical exam for of CVA but CTA head and neck were completed with perfusion study are all negative.  She does not have a retrievable lesion.  She also complained abdominal pain and purple toes and on evaluation she had a CTA thoracoabdominal aorta completed.  CTA was negative for acute abnormality.  She has slight edema around the jejunal zo slight inflammation in that area.  I do not believe the patient has an infectious etiology of her condition.  When she did come back from the CT scan she had complete resolution of symptomatology, she had a normal neurological exam, she was in no distress whatsoever and had a soft nontender abdomen.  The patient has a negative EKG for ectopy, no significant electrolyte abnormality, no CT findings of significant trochanter abnormality.  She has had MRIs, EEGs in the past.  I discussed this with Dr. De Jesus who agrees at this point the patient has a very atypical finding for CVA and does not believe the patient requires hospitalization.  The patient will be following up with Dr. De Jesus or someone in the neurology department in the near future for further evaluation.  Strict return precautions have been given prior to discharge she had no neurological vascular deficits.  I do not believe she had intracranial hemorrhage, CVA, seizure, no idiopathic intracranial hypertension, no encephalitis or meningitis.    CRITICAL CARE  The very real possibilty of a deterioration of this patient's condition required the highest level of my preparedness for sudden, emergent intervention.  I provided  critical care services, which included medication orders, frequent reevaluations of the patient's condition and response to treatment, ordering and reviewing test results, and discussing the case with various consultants.  The critical care time associated with the care of the patient was 35 minutes. Review chart for interventions. This time is exclusive of any other billable procedures.     Discharge Medication List as of 4/22/2020  3:21 PM          FINAL IMPRESSION     1. Dysphasia Active   2. Right sided weakness Active   3. Numbness Active   4. Abdominal pain, unspecified abdominal location Active       DISPOSITION:  Patient will be discharged home in stable condition.    FOLLOW UP:  [facility], Emergency Dept  [address] [facility] [id number] [phone]    If symptoms worsen    [facility] Neurology  [address], [address]  [facility] [id number] [phone]  Schedule an appointment as soon as possible for a visit       BHUPENDRA ChinRIrinaNIrina  [address] [facility] [address]  [phone]    Schedule an appointment as soon as possible for a visit   As needed        Electronically signed by: Tra Caldwell D.O., 4/22/2020 1:07 PM

## 2020-04-24 ENCOUNTER — TELEMEDICINE (OUTPATIENT)
Dept: NEUROLOGY | Facility: MEDICAL CENTER | Age: 40
End: 2020-04-24
Payer: COMMERCIAL

## 2020-04-24 VITALS — HEIGHT: 63 IN | BODY MASS INDEX: 23.21 KG/M2 | HEART RATE: 66 BPM | WEIGHT: 131 LBS

## 2020-04-24 DIAGNOSIS — R56.9 SEIZURE-LIKE ACTIVITY (HCC): ICD-10-CM

## 2020-04-24 DIAGNOSIS — M32.9 LUPUS (HCC): ICD-10-CM

## 2020-04-24 DIAGNOSIS — R29.818 TRANSIENT NEUROLOGIC DEFICIT: ICD-10-CM

## 2020-04-24 PROCEDURE — 99215 OFFICE O/P EST HI 40 MIN: CPT | Mod: 95,CR

## 2020-04-24 RX ORDER — TESTOSTERONE 16.2 MG/G
GEL TRANSDERMAL
COMMUNITY
Start: 2020-02-19 | End: 2021-10-25

## 2020-04-24 RX ORDER — TETRACYCLINE HYDROCHLORIDE 250 MG/1
250 CAPSULE ORAL
COMMUNITY
Start: 2020-03-22 | End: 2021-10-25

## 2020-04-24 ASSESSMENT — FIBROSIS 4 INDEX: FIB4 SCORE: 0.48

## 2020-04-24 NOTE — PROGRESS NOTES
Telemedicine Visit: New Patient     This encounter was conducted via Zoom .   Verbal consent was obtained. Patient's identity was verified.    Subjective:     CC: stroke like symptoms,resolved   Kathleen Alvarado is a 40 y.o. female presenting to establish care and to discuss the evaluation and management of episodes of transient right sided weakness and numbness and slurred speech without a headache. She had episode 2 days ago and went to ED.She reports at that time sudden onset of weakness and numbness in the right side of her face,arm and leg.  This all had resolved in about 40-45 minutes.  She had negative stroke workup now and in the past for similar symptoms.  In 2018 MRI brain was negative and she had normal EEG.  She had similar symptoms in 2008 and again negative stroke workup and EEG.  Of note she had abdominal pain when all this started.  She had 23 GI surgeries due to endometriosis and also colectomy and had been recently diagnosed with lupus.she is supposed to start her treatment shortly.  She is currently     ROS  See HPI  Constitutional: Negative for fever, chills and malaise/fatigue.   HENT: Negative for congestion.    Eyes: Negative for pain.   Respiratory: Negative for cough and shortness of breath.    Cardiovascular: Negative for leg swelling.   Gastrointestinal: Negative for nausea, vomiting, abdominal pain and diarrhea.   Genitourinary: Negative for dysuria and hematuria.   Skin: Negative for rash.   Neurological: Negative for dizziness, focal weakness and headaches.   Endo/Heme/Allergies: Does not bruise/bleed easily.   Psychiatric/Behavioral: Negative for depression.  The patient is not nervous/anxious.      Allergies   Allergen Reactions   • Clindamycin Myalgia     Joints swell   • Methylprednisolone      Increased heart rate, into a-fib.   • Morphine Swelling     Swelling; redness   • Norco [Apap-Fd&C Blue #1-Hydrocodone] Rash     Trouble breathing   • Tramadol Palpitations     palpitations  "  • Demerol Rash     Rash   • Dilaudid [Hydromorphone] Hives and Unspecified     \"Makes my entire body tight\"  Hydrocodone     • Latex Itching     Itch, rash   • Other Misc Itching     \"All Metals\" Blisters   • Sulfa Drugs Hives and Rash     Also feel \"high\"   • Tape Rash     Paper tape ok for IVs   • Trazodone Hives     Hives       Current medicines (including changes today)  Current Outpatient Medications   Medication Sig Dispense Refill   • LORazepam (ATIVAN) 2 MG tablet Take 2 mg by mouth every 8 hours as needed for Anxiety.     • docusate sodium (COLACE) 100 MG Cap Take 100 mg by mouth every morning.     • estradiol (VIVELLE-DOT) 0.0375 MG/24HR patch Apply 1 Patch to skin as directed 2X A WEEK. Change every Sunday and Wednesday     • levothyroxine (SYNTHROID) 100 MCG Tab Take 100 mcg by mouth Every morning on an empty stomach. Must have Synthroid- brand name- not levothyroxine       No current facility-administered medications for this visit.        She  has a past medical history of Allergy, Anesthesia, Anxiety, Bowel habit changes, Cold (05/03/2019), High cholesterol (08/2018), History of anemia, endometriosis, Migraine, Pain (05/2019), Polycystic ovaries, PONV (postoperative nausea and vomiting), Psychiatric problem, Seizure (HCC) (06/2017), Stroke (HCC) (08/2018), and Thyroid condition.  She  has a past surgical history that includes laparoscopy (11/15/2007); tonsillectomy (2000); sandra by laparoscopy (6/6/2009); pr cholecystoenterostomy (2002); carpal tunnel release (3/24/2009); hysteroscopy with video operative (4/7/08); pelviscopy (9/3/08); lysis adhesions gyn (9/3/08); primary c section (1/13/2011); vaginal hysterectomy total (4/4/2011); cystoscopy (4/4/2011); abdominoplasty (4/28/2011); liposuction (4/28/2011); breast implant revision (10/11/2011); mastopexy (10/11/2011); scar revision (10/11/2011); pelviscopy (4/3/2013); laparoscopy (1/15/2014); laparoscopy robotic xi (9/1/2015); colon resection " "robotic (Right, 2015); groin exploration (Left, 2016); ventral hernia repair (2016); inguinal hernia repair robotic (Left, 2017); low anterior resection robotic xi (N/A, 3/14/2018); pr suspension of breast (Bilateral, 2019); breast implant revision (Bilateral, 2019); capsulectomy (Right, 2019); mammoplasty augmentation (Bilateral, ); breast implant revision (Left, 2019); capsulectomy (N/A, 2019); breast reconstruction (N/A, 2019); and hysterectomy, total abdominal.      Family History   Problem Relation Age of Onset   • Arthritis Father         gout   • Heart Disease Paternal Grandfather    • Hypertension Other    • Stroke Other      Family Status   Relation Name Status   • Mo  Alive   • Fa  Alive   • Sis  Alive   • Bro  Alive   • PGMo  Alive   • PGFa     • OTHER  (Not Specified)       Patient Active Problem List    Diagnosis Date Noted   • Narcotic dependence (HCC) 2015     Priority: Low   • Anemia 2015     Priority: Low   • Hypothyroidism 2011     Priority: Low   • Postoperative abdominal pain 2015   • Chronic back pain 2011   • Seasonal allergies 2011   • Acne 2011          Objective:   Vitals obtained by patient:  Encounter Vitals  Standard Vitals  Vitals  Blood Pressure: (Does not monitor from home.Telemed)  Pulse: 66  Height: 160 cm (5' 3\")  Weight: 59.4 kg (131 lb)  Encounter Vitals  Blood Pressure: (Does not monitor from home.Telemed)  Pulse: 66  Weight: 59.4 kg (131 lb)  Height: 160 cm (5' 3\")  BMI (Calculated): 23.21  Pulmonary-Specific Vitals     Durable Medical Equipment-Specific Vitals       Physical Exam:  Constitutional: Alert, no distress, well-groomed.  Skin: No rashes in visible areas.  Eye: Round. Conjunctiva clear, lids normal. No icterus.   ENMT: Lips pink without lesions, good dentition, moist mucous membranes. Phonation normal.  Neck: No masses, no thyromegaly. Moves freely without " pain.  CV: Pulse as reported by patient  Respiratory: Unlabored respiratory effort, no cough or audible wheeze  Psych: Alert and oriented x3, normal affect and mood.   Neurological exam  AAo x 4 pleasant in no distress  Motor normal   CN 2-12 intact   No ataxia   No tremor  Gait normal    Reviewed imaging and labs   Lab Results   Component Value Date/Time    SODIUM 136 04/22/2020 12:58 PM    POTASSIUM 4.4 04/22/2020 12:58 PM    CHLORIDE 99 04/22/2020 12:58 PM    CO2 25 04/22/2020 12:58 PM    GLUCOSE 105 (H) 04/22/2020 12:58 PM    BUN 9 04/22/2020 12:58 PM    CREATININE 0.92 04/22/2020 12:58 PM    CREATININE 1.1 06/16/2008 03:10 AM    BUNCREATRAT 14 08/12/2015 01:55 PM      Lab Results   Component Value Date/Time    WBC 7.2 04/22/2020 12:58 PM    RBC 4.07 (L) 04/22/2020 12:58 PM    HEMOGLOBIN 13.0 04/22/2020 12:58 PM    HEMATOCRIT 39.7 04/22/2020 12:58 PM    MCV 97.5 04/22/2020 12:58 PM    MCH 31.9 04/22/2020 12:58 PM    MCHC 32.7 (L) 04/22/2020 12:58 PM    MPV 8.8 (L) 04/22/2020 12:58 PM    NEUTSPOLYS 79.80 (H) 04/22/2020 12:58 PM    LYMPHOCYTES 15.40 (L) 04/22/2020 12:58 PM    MONOCYTES 3.40 04/22/2020 12:58 PM    EOSINOPHILS 0.10 04/22/2020 12:58 PM    BASOPHILS 0.60 04/22/2020 12:58 PM    IMPRESSION:     No CT evidence of acute infarct, hemorrhage or mass.    Assessment and Plan:   39 yo female with transient neurological symptoms of slurred speech, right sided weakness and numbness with negative stroke workup.  She was recently diagnosed with lupus.Her TSH was low recently.  Diff diagnosis includes seizure, TIA vs less likely  CNS complication of lupus.  We will start with MRI brain with and without contrast and EEG to further delineate the etiology.  I doubt acephalic migraines.  No AEDs at this time we will reassess after EEG.      The following treatment plan was discussed:     There are no diagnoses linked to this encounter.      Follow-up: No follow-ups on file.  IMPRESSION:   IMPRESSION:     No high-grade  stenosis, large vessel occlusion, aneurysm or dissection.  No large vessel occlusion, high-grade stenosis or aneurysm of the [place].

## 2020-06-09 ENCOUNTER — HOSPITAL ENCOUNTER (OUTPATIENT)
Dept: RADIOLOGY | Facility: MEDICAL CENTER | Age: 40
End: 2020-06-09
Attending: FAMILY MEDICINE
Payer: COMMERCIAL

## 2020-06-09 DIAGNOSIS — R10.31 ABDOMINAL PAIN, RIGHT LOWER QUADRANT: ICD-10-CM

## 2020-06-09 PROCEDURE — 74177 CT ABD & PELVIS W/CONTRAST: CPT

## 2020-06-09 PROCEDURE — 700117 HCHG RX CONTRAST REV CODE 255

## 2020-06-09 RX ADMIN — IOHEXOL 100 ML: 350 INJECTION, SOLUTION INTRAVENOUS at 15:30

## 2020-06-09 RX ADMIN — IOHEXOL 25 ML: 240 INJECTION, SOLUTION INTRATHECAL; INTRAVASCULAR; INTRAVENOUS; ORAL at 15:30

## 2020-07-02 ENCOUNTER — HOSPITAL ENCOUNTER (OUTPATIENT)
Dept: RADIOLOGY | Facility: MEDICAL CENTER | Age: 40
End: 2020-07-02
Attending: FAMILY MEDICINE
Payer: COMMERCIAL

## 2020-07-02 DIAGNOSIS — R10.9 STOMACH ACHE: ICD-10-CM

## 2020-07-02 PROCEDURE — 74018 RADEX ABDOMEN 1 VIEW: CPT

## 2020-07-06 ENCOUNTER — APPOINTMENT (OUTPATIENT)
Dept: RADIOLOGY | Facility: MEDICAL CENTER | Age: 40
End: 2020-07-06
Attending: EMERGENCY MEDICINE
Payer: COMMERCIAL

## 2020-07-06 ENCOUNTER — HOSPITAL ENCOUNTER (EMERGENCY)
Facility: MEDICAL CENTER | Age: 40
End: 2020-07-06
Attending: EMERGENCY MEDICINE
Payer: COMMERCIAL

## 2020-07-06 VITALS
WEIGHT: 145.28 LBS | TEMPERATURE: 98.6 F | HEART RATE: 74 BPM | OXYGEN SATURATION: 99 % | HEIGHT: 63 IN | DIASTOLIC BLOOD PRESSURE: 69 MMHG | SYSTOLIC BLOOD PRESSURE: 111 MMHG | BODY MASS INDEX: 25.74 KG/M2 | RESPIRATION RATE: 16 BRPM

## 2020-07-06 DIAGNOSIS — R10.31 RIGHT LOWER QUADRANT ABDOMINAL PAIN: ICD-10-CM

## 2020-07-06 LAB
ALBUMIN SERPL BCP-MCNC: 4.6 G/DL (ref 3.2–4.9)
ALBUMIN/GLOB SERPL: 2.2 G/DL
ALP SERPL-CCNC: 63 U/L (ref 30–99)
ALT SERPL-CCNC: 20 U/L (ref 2–50)
ANION GAP SERPL CALC-SCNC: 11 MMOL/L (ref 7–16)
APPEARANCE UR: CLEAR
AST SERPL-CCNC: 23 U/L (ref 12–45)
BASOPHILS # BLD AUTO: 0.9 % (ref 0–1.8)
BASOPHILS # BLD: 0.06 K/UL (ref 0–0.12)
BILIRUB SERPL-MCNC: 0.3 MG/DL (ref 0.1–1.5)
BILIRUB UR QL STRIP.AUTO: NEGATIVE
BUN SERPL-MCNC: 13 MG/DL (ref 8–22)
CALCIUM SERPL-MCNC: 9.7 MG/DL (ref 8.4–10.2)
CHLORIDE SERPL-SCNC: 100 MMOL/L (ref 96–112)
CO2 SERPL-SCNC: 22 MMOL/L (ref 20–33)
COLOR UR: YELLOW
CREAT SERPL-MCNC: 0.89 MG/DL (ref 0.5–1.4)
EOSINOPHIL # BLD AUTO: 0.18 K/UL (ref 0–0.51)
EOSINOPHIL NFR BLD: 2.8 % (ref 0–6.9)
ERYTHROCYTE [DISTWIDTH] IN BLOOD BY AUTOMATED COUNT: 43.5 FL (ref 35.9–50)
GLOBULIN SER CALC-MCNC: 2.1 G/DL (ref 1.9–3.5)
GLUCOSE SERPL-MCNC: 98 MG/DL (ref 65–99)
GLUCOSE UR STRIP.AUTO-MCNC: NEGATIVE MG/DL
HCT VFR BLD AUTO: 37.9 % (ref 37–47)
HGB BLD-MCNC: 12.8 G/DL (ref 12–16)
IMM GRANULOCYTES # BLD AUTO: 0.02 K/UL (ref 0–0.11)
IMM GRANULOCYTES NFR BLD AUTO: 0.3 % (ref 0–0.9)
KETONES UR STRIP.AUTO-MCNC: NEGATIVE MG/DL
LEUKOCYTE ESTERASE UR QL STRIP.AUTO: NEGATIVE
LIPASE SERPL-CCNC: 31 U/L (ref 7–58)
LYMPHOCYTES # BLD AUTO: 2.02 K/UL (ref 1–4.8)
LYMPHOCYTES NFR BLD: 30.9 % (ref 22–41)
MCH RBC QN AUTO: 31.5 PG (ref 27–33)
MCHC RBC AUTO-ENTMCNC: 33.8 G/DL (ref 33.6–35)
MCV RBC AUTO: 93.3 FL (ref 81.4–97.8)
MICRO URNS: NORMAL
MONOCYTES # BLD AUTO: 0.51 K/UL (ref 0–0.85)
MONOCYTES NFR BLD AUTO: 7.8 % (ref 0–13.4)
NEUTROPHILS # BLD AUTO: 3.74 K/UL (ref 2–7.15)
NEUTROPHILS NFR BLD: 57.3 % (ref 44–72)
NITRITE UR QL STRIP.AUTO: NEGATIVE
NRBC # BLD AUTO: 0 K/UL
NRBC BLD-RTO: 0 /100 WBC
PH UR STRIP.AUTO: 5.5 [PH] (ref 5–8)
PLATELET # BLD AUTO: 259 K/UL (ref 164–446)
PMV BLD AUTO: 9.3 FL (ref 9–12.9)
POTASSIUM SERPL-SCNC: 4.6 MMOL/L (ref 3.6–5.5)
PROT SERPL-MCNC: 6.7 G/DL (ref 6–8.2)
PROT UR QL STRIP: NEGATIVE MG/DL
RBC # BLD AUTO: 4.06 M/UL (ref 4.2–5.4)
RBC UR QL AUTO: NEGATIVE
SODIUM SERPL-SCNC: 133 MMOL/L (ref 135–145)
SP GR UR STRIP.AUTO: 1.01
WBC # BLD AUTO: 6.5 K/UL (ref 4.8–10.8)

## 2020-07-06 PROCEDURE — 96375 TX/PRO/DX INJ NEW DRUG ADDON: CPT

## 2020-07-06 PROCEDURE — 74177 CT ABD & PELVIS W/CONTRAST: CPT

## 2020-07-06 PROCEDURE — 99284 EMERGENCY DEPT VISIT MOD MDM: CPT

## 2020-07-06 PROCEDURE — 85025 COMPLETE CBC W/AUTO DIFF WBC: CPT

## 2020-07-06 PROCEDURE — 81003 URINALYSIS AUTO W/O SCOPE: CPT

## 2020-07-06 PROCEDURE — 96374 THER/PROPH/DIAG INJ IV PUSH: CPT

## 2020-07-06 PROCEDURE — 700117 HCHG RX CONTRAST REV CODE 255: Performed by: EMERGENCY MEDICINE

## 2020-07-06 PROCEDURE — 83690 ASSAY OF LIPASE: CPT

## 2020-07-06 PROCEDURE — 80053 COMPREHEN METABOLIC PANEL: CPT

## 2020-07-06 PROCEDURE — 700111 HCHG RX REV CODE 636 W/ 250 OVERRIDE (IP): Performed by: EMERGENCY MEDICINE

## 2020-07-06 RX ORDER — ONDANSETRON 2 MG/ML
4 INJECTION INTRAMUSCULAR; INTRAVENOUS ONCE
Status: COMPLETED | OUTPATIENT
Start: 2020-07-06 | End: 2020-07-06

## 2020-07-06 RX ORDER — MORPHINE SULFATE 4 MG/ML
4 INJECTION, SOLUTION INTRAMUSCULAR; INTRAVENOUS ONCE
Status: COMPLETED | OUTPATIENT
Start: 2020-07-06 | End: 2020-07-06

## 2020-07-06 RX ADMIN — IOHEXOL 100 ML: 350 INJECTION, SOLUTION INTRAVENOUS at 19:34

## 2020-07-06 RX ADMIN — ONDANSETRON 4 MG: 2 INJECTION INTRAMUSCULAR; INTRAVENOUS at 17:19

## 2020-07-06 RX ADMIN — MORPHINE SULFATE 4 MG: 4 INJECTION INTRAVENOUS at 17:19

## 2020-07-06 RX ADMIN — IOHEXOL 25 ML: 240 INJECTION, SOLUTION INTRATHECAL; INTRAVASCULAR; INTRAVENOUS; ORAL at 19:34

## 2020-07-06 ASSESSMENT — FIBROSIS 4 INDEX: FIB4 SCORE: 0.48

## 2020-07-07 NOTE — ED PROVIDER NOTES
"ED Provider Note    CHIEF COMPLAINT  Chief Complaint   Patient presents with   • Abdominal Pain     severe today On oral ABX x 3 mons for \" bowel infection \"   • Bloating      since last night Now has tary stools       HPI  Kathleen Alvarado is a 40 y.o. female who presents to the emergency room today with complaints abdominal pain and bloating.  Patient states she is abdominal pain on and off for 3 months bloating since last night and black tarry stools.  States that has gotten worse episodes of nausea with episode of vomiting.  Pain is diffuse throughout abdomen described as sharp stabbing to aching rated at a 7/10.  She has a follow-up appointment with GI later this month but symptoms did get worse prompting to come to the emergency room today.  Patient states she has been on several courses of antibiotics and currently on moxifloxacin for which she describes as an infection in her colon placed on this by her PCP.    REVIEW OF SYSTEMS  See HPI for further details. All other systems are negative.     PAST MEDICAL HISTORY  Past Medical History:   Diagnosis Date   • Cold 05/03/2019    Sinus infection, beginning of April, better now, denies SOB, productive cough   • Pain 05/2019    stomach, lower back   • High cholesterol 08/2018    History of, related colon surgery-no meds   • Stroke (HCC) 08/2018    Slight right sided weakness.  (Stroke related to colon surgery)-7/22/19-RESOLVED.    • Seizure (HCC) 06/2017    d/t \"thyroid level being off\"   • Allergy     Seasonal   • Anesthesia     nausea/vomiting;    • Anxiety    • Bowel habit changes     constipation. 7/22/19-resolved after colon surgery.   • History of anemia    • Hx of endometriosis    • Migraine    • Polycystic ovaries     abn menses, vag bleeding x 2 months- has now had hysterectomy   • PONV (postoperative nausea and vomiting)    • Psychiatric problem     depression, anxiety   • Thyroid condition     hypothyroid       FAMILY HISTORY  [unfilled]    SOCIAL " HISTORY  Social History     Socioeconomic History   • Marital status: Single     Spouse name: Not on file   • Number of children: Not on file   • Years of education: Not on file   • Highest education level: Not on file   Occupational History   • Not on file   Social Needs   • Financial resource strain: Not on file   • Food insecurity     Worry: Not on file     Inability: Not on file   • Transportation needs     Medical: Not on file     Non-medical: Not on file   Tobacco Use   • Smoking status: Never Smoker   • Smokeless tobacco: Never Used   • Tobacco comment: exposed to secondhand smoke in childhood   Substance and Sexual Activity   • Alcohol use: No     Alcohol/week: 0.0 oz   • Drug use: No   • Sexual activity: Yes     Partners: Male   Lifestyle   • Physical activity     Days per week: Not on file     Minutes per session: Not on file   • Stress: Not on file   Relationships   • Social connections     Talks on phone: Not on file     Gets together: Not on file     Attends Restoration service: Not on file     Active member of club or organization: Not on file     Attends meetings of clubs or organizations: Not on file     Relationship status: Not on file   • Intimate partner violence     Fear of current or ex partner: Not on file     Emotionally abused: Not on file     Physically abused: Not on file     Forced sexual activity: Not on file   Other Topics Concern   • Not on file   Social History Narrative   • Not on file       SURGICAL HISTORY  Past Surgical History:   Procedure Laterality Date   • BREAST IMPLANT REVISION Left 7/24/2019    Procedure: INSERTION, IMPLANT, BREAST- EXCHANGE SILICONE;  Surgeon: Bg Dumont M.D.;  Location: [facility];  Service: Plastics   • CAPSULECTOMY N/A 7/24/2019    Procedure: CAPSULECTOMY;  Surgeon: Bg Dumont M.D.;  Location: [facility];  Service: Plastics   • BREAST RECONSTRUCTION N/A 7/24/2019    Procedure: RECONSTRUCTION, BREAST - PLACEMENT OF  STRATTICE, REVISION OF RIGHT FULL BREAST LIFT AND STRACTTICE REINFORCEMENT;  Surgeon: Bg Dumont M.D.;  Location: [facility];  Service: Plastics   • PB SUSPENSION OF BREAST Bilateral 5/13/2019    Procedure: MASTOPEXY - LOWER W/REPOSITIONING OF LATERAL FOLDS;  Surgeon: Bg Dumont M.D.;  Location: [facility];  Service: Plastics   • BREAST IMPLANT REVISION Bilateral 5/13/2019    Procedure: INSERTION, IMPLANT, BREAST - EXCHANGE SILICONE;  Surgeon: Bg Dumont M.D.;  Location: [facility];  Service: Plastics   • CAPSULECTOMY Right 5/13/2019    Procedure: CAPSULECTOMY;  Surgeon: Bg Dumont M.D.;  Location: [facility];  Service: Plastics   • LOW ANTERIOR RESECTION ROBOTIC XI N/A 3/14/2018    Procedure: LOW ANTERIOR RESECTION ROBOTIC XI- FOR SUBTOTAL COLECTOMY W/LOW ANTERIOR ANASTOMOSIS;  Surgeon: Tigre Javed M.D.;  Location: [facility];  Service: General   • INGUINAL HERNIA REPAIR ROBOTIC Left 9/27/2017    Procedure: INGUINAL HERNIA REPAIR ROBOTIC RECURRENT;  Surgeon: Tigre Javed M.D.;  Location: [facility];  Service: Gen Robotic   • GROIN EXPLORATION Left 8/30/2016    Procedure: GROIN EXPLORATION;  Surgeon: Tigre Javed M.D.;  Location: [facility];  Service:    • VENTRAL HERNIA REPAIR  8/30/2016    Procedure: VENTRAL HERNIA REPAIR ;  Surgeon: Tigre Javed M.D.;  Location: [facility];  Service:    • COLON RESECTION ROBOTIC Right 12/7/2015    Procedure: COLON RESECTION ROBOTIC for: Hemicolectomy;  Surgeon: Tigre Javed M.D.;  Location: [facility];  Service:    • LAPAROSCOPY ROBOTIC XI  9/1/2015    Procedure: LAPAROSCOPY ROBOTIC XI FOR: ENTEROLYSIS AND METAL CLIP REMOVAL;  Surgeon: Tigre Javed M.D.;  Location: [facility];  Service:    • LAPAROSCOPY  1/15/2014    Performed by Heron Reyes M.D. at [facility]   • PELVISCOPY  4/3/2013    Performed  by Genaro Hadley M.D. at SURGERY SAME DAY [facility] ORS   • BREAST IMPLANT REVISION  10/11/2011    Performed by ANA ROBERTS at SURGERY [facility]   • MASTOPEXY  10/11/2011    Performed by ANA ROBERTS at SURGERY [facility] ORS   • SCAR REVISION  10/11/2011    Performed by ANA ROBERTS at SURGERY [facility]   • ABDOMINOPLASTY  4/28/2011    Performed by ANA ROBERTS at SURGERY [facility] ORS   • LIPOSUCTION  4/28/2011    Performed by ANA ROBERTS at SURGERY [facility] ORS   • VAGINAL HYSTERECTOMY TOTAL  4/4/2011    Performed by GENARO HADLEY at SURGERY SAME DAY [facility] ORS   • CYSTOSCOPY  4/4/2011    Performed by GENARO HADLEY at SURGERY SAME DAY [facility] ORS   • PRIMARY C SECTION  1/13/2011    Performed by VIET CISNEROS at LABOR AND DELIVERY   • ANA BY LAPAROSCOPY  6/6/2009    Performed by LOUIS FREEMAN at SURGERY [facility]   • CARPAL TUNNEL RELEASE  3/24/2009    Performed by MARSHA ALCANTARA at SURGERY SAME DAY [facility] ORS   • PELVISCOPY  9/3/08    Performed by TAWNY TOBAR at SURGERY SAME DAY [facility] ORS   • LYSIS ADHESIONS GYN  9/3/08    Performed by TAWNY TOBAR at SURGERY SAME DAY [facility] ORS   • HYSTEROSCOPY WITH VIDEO OPERATIVE  4/7/08    Performed by TAWNY TOBAR at SURGERY [facility]   • LAPAROSCOPY  11/15/2007    at [facility] out pt clinic seven laparoscopies (for enhdometriosis)   • PB CHOLECYSTOENTEROSTOMY  2002   • TONSILLECTOMY  2000   • MAMMOPLASTY AUGMENTATION Bilateral 2000   • HYSTERECTOMY, TOTAL ABDOMINAL         CURRENT MEDICATIONS  Home Medications    **Home medications have not yet been reviewed for this encounter**         ALLERGIES  Allergies   Allergen Reactions   • Clindamycin Myalgia     Joints swell   • Methylprednisolone      Increased heart rate, into a-fib.   • Morphine Swelling     Swelling; redness   • Norco [Apap-Fd&C Blue #1-Hydrocodone] Rash     Trouble breathing   • Tramadol Palpitations      "palpitations   • Demerol Rash     Rash   • Dilaudid [Hydromorphone] Hives and Unspecified     \"Makes my entire body tight\"  Hydrocodone     • Latex Itching     Itch, rash   • Other Misc Itching     \"All Metals\" Blisters   • Sulfa Drugs Hives and Rash     Also feel \"high\"   • Tape Rash     Paper tape ok for IVs   • Trazodone Hives     Hives       PHYSICAL EXAM  VITAL SIGNS: /69   Pulse 74   Temp 37 °C (98.6 °F) (Temporal)   Resp 16   Ht 1.6 m (5' 3\")   Wt 65.9 kg (145 lb 4.5 oz)   LMP 12/01/2011 (Approximate)   SpO2 99%   BMI 25.74 kg/m²       Constitutional: Well developed, Well nourished, No acute distress, Non-toxic appearance.   HENT: Normocephalic, Atraumatic, Bilateral external ears normal, Oropharynx moist, No oral exudates, Nose normal.   Eyes: PERRLA, EOMI, Conjunctiva normal, No discharge.   Neck: Normal range of motion, No tenderness, Supple, No stridor.   Lymphatic: No lymphadenopathy noted.   Cardiovascular: Normal heart rate, Normal rhythm, No murmurs, No rubs, No gallops.   Thorax & Lungs: Normal breath sounds, No respiratory distress, No wheezing, No chest tenderness.   Abdomen: Bowel sounds normal, Soft, diffuse tenderness, No masses, No pulsatile masses.  No rebound, guarding or peritoneal signs noted  Skin: Warm, Dry, No erythema, No rash.   Back: No tenderness, No CVA tenderness.    Rectal: Normal appearance, Normal sphincter tone. No external or internal lesions noted. Stool is normal color and heme negative.   Extremities: Intact distal pulses, No edema, No tenderness, No cyanosis, No clubbing.   Musculoskeletal: Good range of motion in all major joints. No tenderness to palpation or major deformities noted.   Neurologic: Alert & oriented x 3, Normal motor function, Normal sensory function, No focal deficits noted.   Psychiatric: Affect normal, Judgment normal, Mood normal.    RADIOLOGY/PROCEDURES  CT-ABDOMEN-PELVIS WITH   Final Result      1.  No acute abnormality within the " abdomen or pelvis      2.  Subtotal colectomy      3.  Prior hysterectomy            COURSE & MEDICAL DECISION MAKING  Pertinent Labs & Imaging studies reviewed. (See chart for details)  Rectal exam was negative normal color stool heme-negative patient's blood tests are unremarkable with normal temperature, normal white blood cell count CT scan showed no acute process.  Encouraged patient to follow-up with GI as she is previously scheduled.  Prior to discharge she was given morphine which she states is helped her in the past with Zofran she had no vomiting throughout her stay pain is resolved on reexamination for discharge abdomen soft, supple, nonsurgical.  Placed on abdominal pain instructions if pain increases develops vomiting persistently or fever return prompt emergency room she verbalized understand structures need for follow-up as described above.    FINAL IMPRESSION  1.  Acute abdominal pain  2.   3.         Electronically signed by: Antonio Carter D.O., 7/6/2020 11:12 PM

## 2020-07-07 NOTE — ED NOTES
Pt complains of abdominal pain and nausea. abdomen is soft and non-distended. Rectal exam preformed by erp with this RN at bedside.

## 2020-08-06 ENCOUNTER — APPOINTMENT (OUTPATIENT)
Dept: ADMISSIONS | Facility: MEDICAL CENTER | Age: 40
End: 2020-08-06
Payer: COMMERCIAL

## 2020-08-07 ENCOUNTER — APPOINTMENT (OUTPATIENT)
Dept: ADMISSIONS | Facility: MEDICAL CENTER | Age: 40
End: 2020-08-07
Payer: COMMERCIAL

## 2020-08-07 DIAGNOSIS — Z01.812 PRE-PROCEDURAL LABORATORY EXAMINATION: ICD-10-CM

## 2020-08-07 LAB
ANION GAP SERPL CALC-SCNC: 14 MMOL/L (ref 7–16)
BUN SERPL-MCNC: 9 MG/DL (ref 8–22)
CALCIUM SERPL-MCNC: 9.2 MG/DL (ref 8.5–10.5)
CHLORIDE SERPL-SCNC: 102 MMOL/L (ref 96–112)
CO2 SERPL-SCNC: 22 MMOL/L (ref 20–33)
CREAT SERPL-MCNC: 0.74 MG/DL (ref 0.5–1.4)
ERYTHROCYTE [DISTWIDTH] IN BLOOD BY AUTOMATED COUNT: 41.6 FL (ref 35.9–50)
GLUCOSE SERPL-MCNC: 98 MG/DL (ref 65–99)
HCT VFR BLD AUTO: 38.2 % (ref 37–47)
HGB BLD-MCNC: 12.7 G/DL (ref 12–16)
MCH RBC QN AUTO: 31 PG (ref 27–33)
MCHC RBC AUTO-ENTMCNC: 33.2 G/DL (ref 33.6–35)
MCV RBC AUTO: 93.2 FL (ref 81.4–97.8)
PLATELET # BLD AUTO: 306 K/UL (ref 164–446)
PMV BLD AUTO: 8.7 FL (ref 9–12.9)
POTASSIUM SERPL-SCNC: 4.5 MMOL/L (ref 3.6–5.5)
RBC # BLD AUTO: 4.1 M/UL (ref 4.2–5.4)
SODIUM SERPL-SCNC: 138 MMOL/L (ref 135–145)
WBC # BLD AUTO: 4.6 K/UL (ref 4.8–10.8)

## 2020-08-07 PROCEDURE — 85027 COMPLETE CBC AUTOMATED: CPT

## 2020-08-07 PROCEDURE — 80048 BASIC METABOLIC PNL TOTAL CA: CPT

## 2020-08-07 PROCEDURE — 36415 COLL VENOUS BLD VENIPUNCTURE: CPT

## 2020-08-07 ASSESSMENT — FIBROSIS 4 INDEX: FIB4 SCORE: 0.75

## 2020-08-10 ENCOUNTER — OFFICE VISIT (OUTPATIENT)
Dept: ADMISSIONS | Facility: MEDICAL CENTER | Age: 40
End: 2020-08-10
Attending: COLON & RECTAL SURGERY
Payer: COMMERCIAL

## 2020-08-10 DIAGNOSIS — Z01.812 PRE-OPERATIVE LABORATORY EXAMINATION: ICD-10-CM

## 2020-08-10 LAB
COVID ORDER STATUS COVID19: NORMAL
SARS-COV-2 RNA RESP QL NAA+PROBE: NOTDETECTED
SPECIMEN SOURCE: NORMAL

## 2020-08-10 PROCEDURE — U0003 INFECTIOUS AGENT DETECTION BY NUCLEIC ACID (DNA OR RNA); SEVERE ACUTE RESPIRATORY SYNDROME CORONAVIRUS 2 (SARS-COV-2) (CORONAVIRUS DISEASE [COVID-19]), AMPLIFIED PROBE TECHNIQUE, MAKING USE OF HIGH THROUGHPUT TECHNOLOGIES AS DESCRIBED BY CMS-2020-01-R: HCPCS

## 2020-08-12 ENCOUNTER — ANESTHESIA EVENT (OUTPATIENT)
Dept: SURGERY | Facility: MEDICAL CENTER | Age: 40
End: 2020-08-12
Payer: COMMERCIAL

## 2020-08-12 ENCOUNTER — ANESTHESIA (OUTPATIENT)
Dept: SURGERY | Facility: MEDICAL CENTER | Age: 40
End: 2020-08-12
Payer: COMMERCIAL

## 2020-08-12 ENCOUNTER — HOSPITAL ENCOUNTER (OUTPATIENT)
Facility: MEDICAL CENTER | Age: 40
End: 2020-08-12
Attending: COLON & RECTAL SURGERY | Admitting: COLON & RECTAL SURGERY
Payer: COMMERCIAL

## 2020-08-12 VITALS
HEART RATE: 80 BPM | RESPIRATION RATE: 15 BRPM | WEIGHT: 147.05 LBS | BODY MASS INDEX: 26.05 KG/M2 | TEMPERATURE: 98 F | OXYGEN SATURATION: 99 % | DIASTOLIC BLOOD PRESSURE: 81 MMHG | HEIGHT: 63 IN | SYSTOLIC BLOOD PRESSURE: 129 MMHG

## 2020-08-12 DIAGNOSIS — G89.18 POSTOPERATIVE PAIN: ICD-10-CM

## 2020-08-12 PROCEDURE — 501338 HCHG SHEARS, ENDO: Performed by: COLON & RECTAL SURGERY

## 2020-08-12 PROCEDURE — 160036 HCHG PACU - EA ADDL 30 MINS PHASE I: Performed by: COLON & RECTAL SURGERY

## 2020-08-12 PROCEDURE — 700111 HCHG RX REV CODE 636 W/ 250 OVERRIDE (IP): Performed by: ANESTHESIOLOGY

## 2020-08-12 PROCEDURE — 700105 HCHG RX REV CODE 258: Performed by: COLON & RECTAL SURGERY

## 2020-08-12 PROCEDURE — 501838 HCHG SUTURE GENERAL: Performed by: COLON & RECTAL SURGERY

## 2020-08-12 PROCEDURE — 160035 HCHG PACU - 1ST 60 MINS PHASE I: Performed by: COLON & RECTAL SURGERY

## 2020-08-12 PROCEDURE — 700105 HCHG RX REV CODE 258: Performed by: ANESTHESIOLOGY

## 2020-08-12 PROCEDURE — 501570 HCHG TROCAR, SEPARATOR: Performed by: COLON & RECTAL SURGERY

## 2020-08-12 PROCEDURE — 700101 HCHG RX REV CODE 250: Performed by: COLON & RECTAL SURGERY

## 2020-08-12 PROCEDURE — 700101 HCHG RX REV CODE 250: Performed by: ANESTHESIOLOGY

## 2020-08-12 PROCEDURE — 160048 HCHG OR STATISTICAL LEVEL 1-5: Performed by: COLON & RECTAL SURGERY

## 2020-08-12 PROCEDURE — 502571 HCHG PACK, LAP CHOLE: Performed by: COLON & RECTAL SURGERY

## 2020-08-12 PROCEDURE — 160009 HCHG ANES TIME/MIN: Performed by: COLON & RECTAL SURGERY

## 2020-08-12 PROCEDURE — 700102 HCHG RX REV CODE 250 W/ 637 OVERRIDE(OP): Performed by: ANESTHESIOLOGY

## 2020-08-12 PROCEDURE — 160028 HCHG SURGERY MINUTES - 1ST 30 MINS LEVEL 3: Performed by: COLON & RECTAL SURGERY

## 2020-08-12 PROCEDURE — 160025 RECOVERY II MINUTES (STATS): Performed by: COLON & RECTAL SURGERY

## 2020-08-12 PROCEDURE — 160002 HCHG RECOVERY MINUTES (STAT): Performed by: COLON & RECTAL SURGERY

## 2020-08-12 PROCEDURE — 700102 HCHG RX REV CODE 250 W/ 637 OVERRIDE(OP): Performed by: COLON & RECTAL SURGERY

## 2020-08-12 PROCEDURE — 160046 HCHG PACU - 1ST 60 MINS PHASE II: Performed by: COLON & RECTAL SURGERY

## 2020-08-12 PROCEDURE — 501571 HCHG TROCAR, SEPARATOR 12X100: Performed by: COLON & RECTAL SURGERY

## 2020-08-12 PROCEDURE — A9270 NON-COVERED ITEM OR SERVICE: HCPCS | Performed by: ANESTHESIOLOGY

## 2020-08-12 PROCEDURE — 501583 HCHG TROCAR, THRD CAN&SEAL 5X100: Performed by: COLON & RECTAL SURGERY

## 2020-08-12 PROCEDURE — A9270 NON-COVERED ITEM OR SERVICE: HCPCS | Performed by: COLON & RECTAL SURGERY

## 2020-08-12 RX ORDER — SODIUM CHLORIDE, SODIUM LACTATE, POTASSIUM CHLORIDE, CALCIUM CHLORIDE 600; 310; 30; 20 MG/100ML; MG/100ML; MG/100ML; MG/100ML
INJECTION, SOLUTION INTRAVENOUS CONTINUOUS
Status: DISCONTINUED | OUTPATIENT
Start: 2020-08-12 | End: 2020-08-12 | Stop reason: HOSPADM

## 2020-08-12 RX ORDER — HYDROMORPHONE HYDROCHLORIDE 1 MG/ML
0.2 INJECTION, SOLUTION INTRAMUSCULAR; INTRAVENOUS; SUBCUTANEOUS
Status: DISCONTINUED | OUTPATIENT
Start: 2020-08-12 | End: 2020-08-12 | Stop reason: HOSPADM

## 2020-08-12 RX ORDER — ONDANSETRON 2 MG/ML
4 INJECTION INTRAMUSCULAR; INTRAVENOUS
Status: COMPLETED | OUTPATIENT
Start: 2020-08-12 | End: 2020-08-12

## 2020-08-12 RX ORDER — HYDROMORPHONE HYDROCHLORIDE 1 MG/ML
0.4 INJECTION, SOLUTION INTRAMUSCULAR; INTRAVENOUS; SUBCUTANEOUS
Status: DISCONTINUED | OUTPATIENT
Start: 2020-08-12 | End: 2020-08-12 | Stop reason: HOSPADM

## 2020-08-12 RX ORDER — HYDROMORPHONE HYDROCHLORIDE 1 MG/ML
0.1 INJECTION, SOLUTION INTRAMUSCULAR; INTRAVENOUS; SUBCUTANEOUS
Status: DISCONTINUED | OUTPATIENT
Start: 2020-08-12 | End: 2020-08-12 | Stop reason: HOSPADM

## 2020-08-12 RX ORDER — ROCURONIUM BROMIDE 10 MG/ML
INJECTION, SOLUTION INTRAVENOUS PRN
Status: DISCONTINUED | OUTPATIENT
Start: 2020-08-12 | End: 2020-08-12 | Stop reason: SURG

## 2020-08-12 RX ORDER — SUCCINYLCHOLINE/SOD CL,ISO/PF 200MG/10ML
SYRINGE (ML) INTRAVENOUS PRN
Status: DISCONTINUED | OUTPATIENT
Start: 2020-08-12 | End: 2020-08-12 | Stop reason: SURG

## 2020-08-12 RX ORDER — LIDOCAINE HYDROCHLORIDE 40 MG/ML
SOLUTION TOPICAL PRN
Status: DISCONTINUED | OUTPATIENT
Start: 2020-08-12 | End: 2020-08-12 | Stop reason: SURG

## 2020-08-12 RX ORDER — ONDANSETRON 2 MG/ML
INJECTION INTRAMUSCULAR; INTRAVENOUS PRN
Status: DISCONTINUED | OUTPATIENT
Start: 2020-08-12 | End: 2020-08-12 | Stop reason: SURG

## 2020-08-12 RX ORDER — HALOPERIDOL 5 MG/ML
1 INJECTION INTRAMUSCULAR
Status: DISCONTINUED | OUTPATIENT
Start: 2020-08-12 | End: 2020-08-12 | Stop reason: HOSPADM

## 2020-08-12 RX ORDER — CEFAZOLIN SODIUM 1 G/3ML
INJECTION, POWDER, FOR SOLUTION INTRAMUSCULAR; INTRAVENOUS PRN
Status: DISCONTINUED | OUTPATIENT
Start: 2020-08-12 | End: 2020-08-12 | Stop reason: SURG

## 2020-08-12 RX ORDER — OXYCODONE HCL 5 MG/5 ML
10 SOLUTION, ORAL ORAL
Status: COMPLETED | OUTPATIENT
Start: 2020-08-12 | End: 2020-08-12

## 2020-08-12 RX ORDER — DEXAMETHASONE SODIUM PHOSPHATE 4 MG/ML
INJECTION, SOLUTION INTRA-ARTICULAR; INTRALESIONAL; INTRAMUSCULAR; INTRAVENOUS; SOFT TISSUE PRN
Status: DISCONTINUED | OUTPATIENT
Start: 2020-08-12 | End: 2020-08-12 | Stop reason: SURG

## 2020-08-12 RX ORDER — BUPIVACAINE HYDROCHLORIDE AND EPINEPHRINE 5; 5 MG/ML; UG/ML
INJECTION, SOLUTION EPIDURAL; INTRACAUDAL; PERINEURAL
Status: DISCONTINUED | OUTPATIENT
Start: 2020-08-12 | End: 2020-08-12 | Stop reason: HOSPADM

## 2020-08-12 RX ORDER — OXYCODONE HCL 5 MG/5 ML
5 SOLUTION, ORAL ORAL
Status: COMPLETED | OUTPATIENT
Start: 2020-08-12 | End: 2020-08-12

## 2020-08-12 RX ORDER — DIPHENHYDRAMINE HYDROCHLORIDE 50 MG/ML
12.5 INJECTION INTRAMUSCULAR; INTRAVENOUS
Status: COMPLETED | OUTPATIENT
Start: 2020-08-12 | End: 2020-08-12

## 2020-08-12 RX ORDER — OXYCODONE HYDROCHLORIDE 5 MG/1
5 TABLET ORAL EVERY 4 HOURS PRN
Qty: 15 TAB | Refills: 0 | Status: SHIPPED | OUTPATIENT
Start: 2020-08-12 | End: 2020-08-15

## 2020-08-12 RX ADMIN — CEFAZOLIN 2 G: 330 INJECTION, POWDER, FOR SOLUTION INTRAMUSCULAR; INTRAVENOUS at 11:59

## 2020-08-12 RX ADMIN — OXYCODONE HYDROCHLORIDE 10 MG: 5 SOLUTION ORAL at 12:37

## 2020-08-12 RX ADMIN — Medication 60 MG: at 11:56

## 2020-08-12 RX ADMIN — DIPHENHYDRAMINE HYDROCHLORIDE 12.5 MG: 50 INJECTION INTRAMUSCULAR; INTRAVENOUS at 13:09

## 2020-08-12 RX ADMIN — SODIUM CHLORIDE, POTASSIUM CHLORIDE, SODIUM LACTATE AND CALCIUM CHLORIDE: 600; 310; 30; 20 INJECTION, SOLUTION INTRAVENOUS at 11:32

## 2020-08-12 RX ADMIN — PROPOFOL 200 MG: 10 INJECTION, EMULSION INTRAVENOUS at 11:56

## 2020-08-12 RX ADMIN — HYDROMORPHONE HYDROCHLORIDE 0.2 MG: 1 INJECTION, SOLUTION INTRAMUSCULAR; INTRAVENOUS; SUBCUTANEOUS at 13:18

## 2020-08-12 RX ADMIN — ROCURONIUM BROMIDE 20 MG: 10 INJECTION, SOLUTION INTRAVENOUS at 12:02

## 2020-08-12 RX ADMIN — FENTANYL CITRATE 50 MCG: 50 INJECTION INTRAMUSCULAR; INTRAVENOUS at 12:45

## 2020-08-12 RX ADMIN — FENTANYL CITRATE 50 MCG: 50 INJECTION INTRAMUSCULAR; INTRAVENOUS at 12:53

## 2020-08-12 RX ADMIN — ROCURONIUM BROMIDE 10 MG: 10 INJECTION, SOLUTION INTRAVENOUS at 11:56

## 2020-08-12 RX ADMIN — LIDOCAINE HYDROCHLORIDE 4 ML: 40 SOLUTION TOPICAL at 11:56

## 2020-08-12 RX ADMIN — SODIUM CHLORIDE, POTASSIUM CHLORIDE, SODIUM LACTATE AND CALCIUM CHLORIDE: 600; 310; 30; 20 INJECTION, SOLUTION INTRAVENOUS at 12:35

## 2020-08-12 RX ADMIN — DIPHENHYDRAMINE HYDROCHLORIDE 12.5 MG: 50 INJECTION INTRAMUSCULAR; INTRAVENOUS at 13:26

## 2020-08-12 RX ADMIN — ONDANSETRON 4 MG: 2 INJECTION INTRAMUSCULAR; INTRAVENOUS at 12:35

## 2020-08-12 RX ADMIN — FENTANYL CITRATE 100 MCG: 50 INJECTION INTRAMUSCULAR; INTRAVENOUS at 11:56

## 2020-08-12 RX ADMIN — SUGAMMADEX 200 MG: 100 INJECTION, SOLUTION INTRAVENOUS at 12:09

## 2020-08-12 RX ADMIN — ONDANSETRON 4 MG: 2 INJECTION INTRAMUSCULAR; INTRAVENOUS at 12:03

## 2020-08-12 RX ADMIN — DEXAMETHASONE SODIUM PHOSPHATE 4 MG: 4 INJECTION, SOLUTION INTRA-ARTICULAR; INTRALESIONAL; INTRAMUSCULAR; INTRAVENOUS; SOFT TISSUE at 12:03

## 2020-08-12 RX ADMIN — POVIDONE-IODINE 15 ML: 10 SOLUTION TOPICAL at 11:40

## 2020-08-12 SDOH — HEALTH STABILITY: MENTAL HEALTH: HOW OFTEN DO YOU HAVE 6 OR MORE DRINKS ON ONE OCCASION?: NEVER

## 2020-08-12 SDOH — HEALTH STABILITY: MENTAL HEALTH: HOW OFTEN DO YOU HAVE A DRINK CONTAINING ALCOHOL?: MONTHLY OR LESS

## 2020-08-12 SDOH — HEALTH STABILITY: MENTAL HEALTH: HOW MANY STANDARD DRINKS CONTAINING ALCOHOL DO YOU HAVE ON A TYPICAL DAY?: 1 OR 2

## 2020-08-12 ASSESSMENT — FIBROSIS 4 INDEX: FIB4 SCORE: 0.64

## 2020-08-12 ASSESSMENT — PAIN SCALES - GENERAL: PAIN_LEVEL: 6

## 2020-08-12 NOTE — ANESTHESIA POSTPROCEDURE EVALUATION
Patient: Kathleen Alvarado    Procedure Summary     Date: 08/12/20 Room / Location: [place] [address] / SURGERY [facility]    Anesthesia Start: 1153 Anesthesia Stop: 1220    Procedures:       LAPAROSCOPY- DIAGNOSTIC (Abdomen)      LYSIS, ADHESIONS, LAPAROSCOPIC (Abdomen) Diagnosis: (RIGHT LOWER QUADRANT ABDOMINAL PAIN)    Surgeon: Heron Reyes M.D. Responsible Provider: Rajiv Keane M.D.    Anesthesia Type: general ASA Status: 2          Final Anesthesia Type: general  Last vitals  BP   Blood Pressure: 120/78    Temp   36.7 °C (98 °F)    Pulse   Pulse: 77   Resp   16    SpO2   100 %      Anesthesia Post Evaluation    Patient location during evaluation: PACU  Patient participation: complete - patient participated  Level of consciousness: awake and alert  Pain score: 6    Airway patency: patent  Anesthetic complications: no  Cardiovascular status: hemodynamically stable  Respiratory status: acceptable  Hydration status: euvolemic    PONV: none           Nurse Pain Score: 7 (NPRS)

## 2020-08-12 NOTE — OP REPORT
DATE OF SERVICE:  08/12/2020    PREOPERATIVE DIAGNOSIS:  Chronic right lower quadrant abdominal pain.    POSTOPERATIVE DIAGNOSES:  1.  Chronic right lower quadrant abdominal pain.  2.  Normal laparoscopy without adhesions.    OPERATIONS PERFORMED:  Diagnostic laparoscopy.    SURGEON:  Heron Reyes MD    ASSISTANT:  Debra Gomez PA-C    ANESTHESIOLOGIST:  Rajiv Keane MD    INDICATIONS FOR PROCEDURE:  The patient is a 40-year-old female with multiple   previous abdominal operations including ventral hernia repairs, DEYANIRA-BSO,   appendectomy, cholecystectomy and she has had a persistent chronic abdominal   pain that has worsened in the last year or more.  It is predominantly in the   right lower quadrant.    DETAILS OF PROCEDURE:  After an extensive informed consent discussion process,   the patient was brought to the operating room.  She was placed in a supine   position on the operating table.  After induction of general anesthesia and   placement of an endotracheal tube, the abdomen was prepped and draped in the   usual sterile fashion.  After administration of intravenous antibiotics, local   anesthetic mixture of bupivacaine with epinephrine was used to infiltrate the   skin and subcutaneous tissues.  A bladeless optical entry trocar was placed   through the site of a previous scar.  Pneumoperitoneum was safely established   and the laparoscope introduced.    Two additional 5 mm trocars were placed in a previous scar sites.  Careful   examination was then begun.  There were no adhesions to the anterior abdominal   wall and there were no adhesions to the pelvic side wall or lateral side   walls of the abdomen or pelvis.    The bowels were completely mobile.  There were no interloop adhesions,   whatsoever.  She had a previous colectomy and there were no areas that could   be identified that appeared abnormal.  There were no dilated bowel loops and   no adhesions in the abdomen or pelvis.  The upper and lower  abdomen, right and   left gutters, pelvis and even retroperitoneum were examined and showed no   discernible abnormalities.  The pneumoperitoneum was then allowed to escape.    The ports were removed under direct vision.  The ports were closed with Vicryl   sutures.  Steri-Strips and sterile dressings were applied.    Needle, sponge and instrument counts were correct.    ESTIMATED BLOOD LOSS:  Minimal.    COMPLICATIONS:  None.       ____________________________________     MD YOLIS HADLEY / DEWAYNE    DD:  08/12/2020 12:39:38  DT:  08/12/2020 14:21:41    D#:  [id number]  Job#:  [id number]

## 2020-08-12 NOTE — DISCHARGE INSTRUCTIONS
3  ACTIVITY: Rest and take it easy for the first 24 hours.  A responsible adult is recommended to remain with you during that time.  It is normal to feel sleepy.  We encourage you to not do anything that requires balance, judgment or coordination.    MILD FLU-LIKE SYMPTOMS ARE NORMAL. YOU MAY EXPERIENCE GENERALIZED MUSCLE ACHES, THROAT IRRITATION, HEADACHE AND/OR SOME NAUSEA.    FOR 24 HOURS DO NOT:  Drive, operate machinery or run household appliances.  Drink beer or alcoholic beverages.   Make important decisions or sign legal documents.    SPECIAL INSTRUCTIONS:   D/C instructions:    1. DIET: Upon discharge from the hospital you may resume your normal preoperative diet. Depending on how you are feeling and whether you have nausea or not, you may wish to stay with a bland diet for the first few days. However, you can advance this as quickly as you feel ready.    2. ACTIVITIES: After discharge from the hospital, you may resume full routine activities. However, there should be no heavy lifting (greater than 15 pounds) and no strenuous activities until after your follow-up visit. Otherwise, routine activities of daily living are acceptable.    3. DRIVING: You may drive whenever you are off pain medications and are able to perform the activities needed to drive, i.e. turning, bending, twisting, etc.    4. BATHING: You may get the wound wet 2 days after surgery. You may shower, but do not submerge in a bath for at least a week. Dressings/Bandaids may come off after 48 hours. Please leave the steri-strips in place, they will fall off over 5-7 days.     5. BOWEL FUNCTION: Constipation is common after an operation, especially with pain medications. The combination of pain medication and decreased activity level can cause constipation in otherwise normal patients. If you feel this is occurring, take a laxative (Miralax, Milk of Magnesia, Ex-Lax, Senokot, etc.) until the problem has resolved.    6. PAIN MEDICATION: You will  be given a prescription for pain medication at discharge. Please take these as directed. It is important to remember not to take medications on an empty stomach as this may cause nausea.    7.CALL IF YOU HAVE: (1) Fevers to more than 101.o0 F, (2) Unusual chest or leg pain, (3) Drainage or fluid from incision that may be foul smelling, increased tenderness or soreness at the wound or the wound edges are no longer together, redness or swelling at the incision site. Please do not hesitate to call with any other questions.     8. APPOINTMENT: Contact our office at [phone] for a follow-up appointment in 1-2 weeks following your procedure.    If you have any additional questions, please do not hesitate to call the office and speak to either myself or the physician on call.    Office address:  [facility].  [address] [place] [id number]      DIET: To avoid nausea, slowly advance diet as tolerated, avoiding spicy or greasy foods for the first day.  Add more substantial food to your diet according to your physician's instructions. INCREASE FLUIDS AND FIBER TO AVOID CONSTIPATION.    SURGICAL DRESSING/BATHING: BATHING: You may get the wound wet 2 days after surgery. You may shower, but do not submerge in a bath for at least a week. Dressings/Bandaids may come off after 48 hours. Please leave the steri-strips in place, they will fall off over 5-7 days.       FOLLOW-UP APPOINTMENT:  A follow-up appointment should be arranged with your doctor in 1-2 Weeks; call to schedule.    You should CALL YOUR PHYSICIAN if you develop:  Fever greater than 101 degrees F.  Pain not relieved by medication, or persistent nausea or vomiting.  Excessive bleeding (blood soaking through dressing) or unexpected drainage from the wound.  Extreme redness or swelling around the incision site, drainage of pus or foul smelling drainage.  Inability to urinate or empty your bladder within 8 hours.  Problems with breathing  or chest pain.    You should call 911 if you develop problems with breathing or chest pain.  If you are unable to contact your doctor or surgical center, you should go to the nearest emergency room or urgent care center.  Physician's telephone #: [phone] Dr. Reyes    If any questions arise, call your doctor.  If your doctor is not available, please feel free to call the Surgical Center at [phone].  The Center is open Monday through Friday from 7AM to 7PM.  You can also call the HEALTH HOTLINE open 24 hours/day, 7 days/week and speak to a nurse at [phone], or toll free at [phone].    A registered nurse may call you a few days after your surgery to see how you are doing after your procedure.    MEDICATIONS: Resume taking daily medication.  Take prescribed pain medication with food.  If no medication is prescribed, you may take non-aspirin pain medication if needed.  PAIN MEDICATION CAN BE VERY CONSTIPATING.  Take a stool softener or laxative such as senokot, pericolace, or milk of magnesia if needed.    Prescription given for Oxycodone.  Last pain medication given at Oxycodone 10mg at 12;37pm..    If your physician has prescribed pain medication that includes Acetaminophen (Tylenol), do not take additional Acetaminophen (Tylenol) while taking the prescribed medication.    Depression / Suicide Risk    As you are discharged from this [facility] facility, it is important to learn how to keep safe from harming yourself.    Recognize the warning signs:  · Abrupt changes in personality, positive or negative- including increase in energy   · Giving away possessions  · Change in eating patterns- significant weight changes-  positive or negative  · Change in sleeping patterns- unable to sleep or sleeping all the time   · Unwillingness or inability to communicate  · Depression  · Unusual sadness, discouragement and loneliness  · Talk of wanting to die  · Neglect of personal  appearance   · Rebelliousness- reckless behavior  · Withdrawal from people/activities they love  · Confusion- inability to concentrate     If you or a loved one observes any of these behaviors or has concerns about self-harm, here's what you can do:  · Talk about it- your feelings and reasons for harming yourself  · Remove any means that you might use to hurt yourself (examples: pills, rope, extension cords, firearm)  · Get professional help from the community (Mental Health, Substance Abuse, psychological counseling)  · Do not be alone:Call your Safe Contact- someone whom you trust who will be there for you.  · Call your local CRISIS HOTLINE 642-8594 or 299-441-0492  · Call your local Children's Mobile Crisis Response Team [place] (606) 990-8903 or www.[employer]  · Call the toll free National Suicide Prevention Hotlines   · National Suicide Prevention Lifeline 460-251-VGZR (2264)  · National Hope Line Network 800-SUICIDE (189-3347)

## 2020-08-12 NOTE — OR NURSING
"Pt arrived in Phase 2 at 1400, ambulated to recliner chair, VSS, has ambulated to BR , able to void, drinking PO fluids, states abdominal pain is \"tolerable at 6 of 10 level, denies nausea or SOB, d/c instructions and RX reviewed with pt, she verbalized understanding of instructions, PIV removed, tip intact, 3 abdominal incisions with band aids CDI, ice pack to abdomen, discharged via w/c to home with boyfriend at 1420.  "

## 2020-08-12 NOTE — ANESTHESIA TIME REPORT
Anesthesia Start and Stop Event Times     Date Time Event    8/12/2020 1141 Ready for Procedure     1153 Anesthesia Start     1220 Anesthesia Stop        Responsible Staff  08/12/20    Name Role Begin End    Rajiv Keane M.D. Anesth 1153 1220        Preop Diagnosis (Free Text):  Pre-op Diagnosis     RIGHT LOWER QUADRANT ABDOMINAL PAIN        Preop Diagnosis (Codes):    Post op Diagnosis  Abdominal pain      Premium Reason  Non-Premium    Comments:

## 2020-08-12 NOTE — ANESTHESIA PROCEDURE NOTES
Airway    Date/Time: 8/12/2020 11:57 AM  Performed by: Rajiv Keane M.D.  Authorized by: Rajiv Keane M.D.     Location:  OR  Urgency:  Elective  Difficult Airway: No    Indications for Airway Management:  Anesthesia      Spontaneous Ventilation: absent    Sedation Level:  Deep  Preoxygenated: Yes    Patient Position:  Sniffing  Mask Difficulty Assessment:  0 - not attempted  Final Airway Type:  Endotracheal airway  Final Endotracheal Airway:  ETT  Cuffed: Yes    Technique Used for Successful ETT Placement:  Direct laryngoscopy    Insertion Site:  Oral  Blade Type:  Jp  Laryngoscope Blade/Videolaryngoscope Blade Size:  3  ETT Size (mm):  7.0  Measured from:  Teeth  ETT to Teeth (cm):  20  Placement Verified by: auscultation and capnometry    Cormack-Lehane Classification:  Grade I - full view of glottis  Number of Attempts at Approach:  1

## 2020-08-12 NOTE — ANESTHESIA PREPROCEDURE EVALUATION
"    Relevant Problems   ENDO   (+) Hypothyroidism     /78   Pulse 77   Temp 36.7 °C (98 °F) (Temporal)   Resp 16   Ht 1.6 m (5' 3\")   Wt 66.7 kg (147 lb 0.8 oz)   LMP 12/01/2011 (Approximate)   SpO2 100%   BMI 26.05 kg/m²     Physical Exam    Airway   Mallampati: II  TM distance: >3 FB  Neck ROM: full       Cardiovascular - normal exam  Rhythm: regular  Rate: normal  (-) murmur     Dental - normal exam           Pulmonary - normal exam  Breath sounds clear to auscultation     Abdominal    Neurological - normal exam                 Anesthesia Plan    ASA 2       Plan - general       Airway plan will be ETT        Induction: intravenous    Postoperative Plan: Postoperative administration of opioids is intended.    Pertinent diagnostic labs and testing reviewed    Informed Consent:    Anesthetic plan and risks discussed with patient.    Use of blood products discussed with: patient whom consented to blood products.         "

## 2021-08-14 ENCOUNTER — APPOINTMENT (OUTPATIENT)
Dept: RADIOLOGY | Facility: MEDICAL CENTER | Age: 41
End: 2021-08-14
Attending: PHYSICAL MEDICINE & REHABILITATION
Payer: COMMERCIAL

## 2021-08-20 ENCOUNTER — HOSPITAL ENCOUNTER (OUTPATIENT)
Dept: RADIOLOGY | Facility: MEDICAL CENTER | Age: 41
End: 2021-08-20
Attending: PHYSICAL MEDICINE & REHABILITATION
Payer: COMMERCIAL

## 2021-08-20 DIAGNOSIS — M54.16 LUMBAR RADICULOPATHY: ICD-10-CM

## 2021-08-20 PROCEDURE — 72148 MRI LUMBAR SPINE W/O DYE: CPT

## 2021-09-18 ENCOUNTER — HOSPITAL ENCOUNTER (OUTPATIENT)
Dept: RADIOLOGY | Facility: MEDICAL CENTER | Age: 41
End: 2021-09-18
Attending: STUDENT IN AN ORGANIZED HEALTH CARE EDUCATION/TRAINING PROGRAM
Payer: COMMERCIAL

## 2021-09-18 DIAGNOSIS — R10.9 ACUTE ABDOMINAL PAIN: ICD-10-CM

## 2021-09-18 PROCEDURE — 74177 CT ABD & PELVIS W/CONTRAST: CPT

## 2021-09-18 PROCEDURE — 700117 HCHG RX CONTRAST REV CODE 255: Performed by: STUDENT IN AN ORGANIZED HEALTH CARE EDUCATION/TRAINING PROGRAM

## 2021-09-18 RX ADMIN — IOHEXOL 100 ML: 350 INJECTION, SOLUTION INTRAVENOUS at 09:57

## 2021-09-18 RX ADMIN — IOHEXOL 25 ML: 240 INJECTION, SOLUTION INTRATHECAL; INTRAVASCULAR; INTRAVENOUS; ORAL at 09:53

## 2021-10-19 RX ORDER — BUPROPION HYDROCHLORIDE 150 MG/1
300 TABLET ORAL
COMMUNITY
Start: 2021-05-18 | End: 2022-01-18 | Stop reason: ALTCHOICE

## 2021-10-19 RX ORDER — ACYCLOVIR 800 MG/1
TABLET ORAL
COMMUNITY
End: 2021-10-25

## 2021-10-19 RX ORDER — BUSPIRONE HYDROCHLORIDE 15 MG/1
TABLET ORAL
COMMUNITY
End: 2021-10-25

## 2021-10-19 RX ORDER — DULOXETIN HYDROCHLORIDE 60 MG/1
CAPSULE, DELAYED RELEASE ORAL
COMMUNITY
Start: 2021-06-15 | End: 2021-10-25 | Stop reason: ALTCHOICE

## 2021-10-19 RX ORDER — HYDROCODONE BITARTRATE AND ACETAMINOPHEN 10; 325 MG/1; MG/1
TABLET ORAL
COMMUNITY
End: 2021-10-25

## 2021-10-19 RX ORDER — METOCLOPRAMIDE 10 MG/1
TABLET ORAL
COMMUNITY
Start: 2021-05-18 | End: 2021-11-19

## 2021-10-19 RX ORDER — GABAPENTIN 100 MG/1
CAPSULE ORAL
COMMUNITY
Start: 2021-08-16 | End: 2021-12-18

## 2021-10-19 RX ORDER — ETODOLAC 400 MG/1
TABLET, FILM COATED ORAL
COMMUNITY
Start: 2021-08-17 | End: 2021-10-25

## 2021-10-19 RX ORDER — FUROSEMIDE 20 MG/1
TABLET ORAL
COMMUNITY
End: 2021-10-25 | Stop reason: SDUPTHER

## 2021-10-19 RX ORDER — DULOXETIN HYDROCHLORIDE 60 MG/1
60 CAPSULE, DELAYED RELEASE ORAL
COMMUNITY
Start: 2021-07-26 | End: 2021-10-25

## 2021-10-19 RX ORDER — ESTRADIOL 0.04 MG/D
FILM, EXTENDED RELEASE TRANSDERMAL
COMMUNITY
End: 2021-10-25 | Stop reason: ALTCHOICE

## 2021-10-19 RX ORDER — METOCLOPRAMIDE 10 MG/1
TABLET ORAL
COMMUNITY
Start: 2021-07-26 | End: 2021-10-25

## 2021-10-19 RX ORDER — IBUPROFEN 800 MG/1
800 TABLET ORAL EVERY 8 HOURS PRN
COMMUNITY
Start: 2021-08-18 | End: 2022-05-04

## 2021-10-19 RX ORDER — MONTELUKAST SODIUM 10 MG/1
TABLET ORAL
COMMUNITY
End: 2021-10-25

## 2021-10-19 RX ORDER — ALPRAZOLAM 2 MG/1
TABLET ORAL
COMMUNITY
End: 2021-10-25

## 2021-10-19 RX ORDER — LORAZEPAM 0.5 MG/1
TABLET ORAL
COMMUNITY
End: 2021-10-25

## 2021-10-19 RX ORDER — MECLIZINE HCL 25MG 25 MG/1
TABLET, CHEWABLE ORAL
COMMUNITY
End: 2021-10-25

## 2021-10-19 RX ORDER — PHENTERMINE HYDROCHLORIDE 37.5 MG/1
TABLET ORAL
COMMUNITY
End: 2021-10-25

## 2021-10-19 RX ORDER — ONDANSETRON 4 MG/1
TABLET, ORALLY DISINTEGRATING ORAL
COMMUNITY
Start: 2021-07-28 | End: 2021-11-23

## 2021-10-19 RX ORDER — ESTRADIOL 0.05 MG/D
1 PATCH TRANSDERMAL
COMMUNITY
End: 2021-10-25 | Stop reason: ALTCHOICE

## 2021-10-19 RX ORDER — LEVOTHYROXINE SODIUM 0.1 MG/1
TABLET ORAL
COMMUNITY
End: 2021-10-25

## 2021-10-19 RX ORDER — LEVOTHYROXINE SODIUM 0.1 MG/1
TABLET ORAL
COMMUNITY
Start: 2021-06-15 | End: 2021-10-25

## 2021-10-19 RX ORDER — ONDANSETRON 4 MG/1
TABLET, ORALLY DISINTEGRATING ORAL
COMMUNITY
Start: 2021-07-29 | End: 2021-10-25

## 2021-10-20 ENCOUNTER — APPOINTMENT (OUTPATIENT)
Dept: INTERNAL MEDICINE | Facility: OTHER | Age: 41
End: 2021-10-20
Payer: COMMERCIAL

## 2021-10-25 ENCOUNTER — OFFICE VISIT (OUTPATIENT)
Dept: INTERNAL MEDICINE | Facility: OTHER | Age: 41
End: 2021-10-25
Payer: COMMERCIAL

## 2021-10-25 VITALS
WEIGHT: 162 LBS | TEMPERATURE: 97.7 F | HEART RATE: 104 BPM | HEIGHT: 65 IN | DIASTOLIC BLOOD PRESSURE: 87 MMHG | SYSTOLIC BLOOD PRESSURE: 126 MMHG | BODY MASS INDEX: 26.99 KG/M2

## 2021-10-25 DIAGNOSIS — E55.9 VITAMIN D DEFICIENCY: ICD-10-CM

## 2021-10-25 DIAGNOSIS — Z86.73 HISTORY OF TIA (TRANSIENT ISCHEMIC ATTACK): ICD-10-CM

## 2021-10-25 DIAGNOSIS — R53.83 FATIGUE, UNSPECIFIED TYPE: ICD-10-CM

## 2021-10-25 DIAGNOSIS — K31.84 GASTROPARESIS: ICD-10-CM

## 2021-10-25 DIAGNOSIS — F32.A DEPRESSION, UNSPECIFIED DEPRESSION TYPE: ICD-10-CM

## 2021-10-25 DIAGNOSIS — E03.9 HYPOTHYROIDISM, UNSPECIFIED TYPE: Chronic | ICD-10-CM

## 2021-10-25 DIAGNOSIS — G43.909 MIGRAINE WITHOUT STATUS MIGRAINOSUS, NOT INTRACTABLE, UNSPECIFIED MIGRAINE TYPE: ICD-10-CM

## 2021-10-25 DIAGNOSIS — Z79.890 HORMONE REPLACEMENT THERAPY: ICD-10-CM

## 2021-10-25 PROBLEM — R10.9 ABDOMINAL PAIN: Status: ACTIVE | Noted: 2018-03-18

## 2021-10-25 PROCEDURE — 99214 OFFICE O/P EST MOD 30 MIN: CPT | Mod: GC | Performed by: STUDENT IN AN ORGANIZED HEALTH CARE EDUCATION/TRAINING PROGRAM

## 2021-10-25 RX ORDER — FUROSEMIDE 20 MG/1
20 TABLET ORAL DAILY
Qty: 90 TABLET | Refills: 0 | Status: SHIPPED | OUTPATIENT
Start: 2021-10-25 | End: 2021-12-18

## 2021-10-25 RX ORDER — DULOXETIN HYDROCHLORIDE 30 MG/1
90 CAPSULE, DELAYED RELEASE ORAL DAILY
Qty: 180 CAPSULE | Refills: 1 | Status: SHIPPED | OUTPATIENT
Start: 2021-10-25 | End: 2021-12-17

## 2021-10-25 RX ORDER — ESTRADIOL 0.05 MG/D
1 PATCH TRANSDERMAL
Qty: 12 PATCH | Refills: 0 | Status: SHIPPED | OUTPATIENT
Start: 2021-10-25 | End: 2021-10-25

## 2021-10-25 ASSESSMENT — FIBROSIS 4 INDEX: FIB4 SCORE: 0.66

## 2021-10-26 ENCOUNTER — TELEPHONE (OUTPATIENT)
Dept: INTERNAL MEDICINE | Facility: OTHER | Age: 41
End: 2021-10-26

## 2021-10-26 NOTE — TELEPHONE ENCOUNTER
VOICEMAIL  1. Caller Name: Kathleen Alvarado                      Call Back Number: [phone]    2. Message: pt lm requesting her migraine medication, she stated the pharmacy had not received it yet.    3. Patient approves office to leave a detailed voicemail/MyChart message: yes

## 2021-10-27 ASSESSMENT — ENCOUNTER SYMPTOMS
POLYDIPSIA: 0
INSOMNIA: 0
MYALGIAS: 0
BLURRED VISION: 0
NAUSEA: 0
DEPRESSION: 0
ABDOMINAL PAIN: 1
SHORTNESS OF BREATH: 0
MEMORY LOSS: 0
FEVER: 0
COUGH: 0
PALPITATIONS: 0
SORE THROAT: 0
VOMITING: 0
LOSS OF CONSCIOUSNESS: 0
HEADACHES: 1
CHILLS: 0
DOUBLE VISION: 0
FALLS: 0
BRUISES/BLEEDS EASILY: 0

## 2021-10-28 NOTE — PROGRESS NOTES
Established Patient    Kathleen Alvarado is a 41 y.o. female who presents today with the following:    CC: patient is here to discuss medication refills    HPI: 42 yo F with PMH of endometriosis, gastroparesis, more than 30 abdominal/pelvic surgeries, ovarian rupture, TIA (7 total with last 2 y ago), and bowel obstruction leading to colon removal presents today to discuss refills of medications and review her current medical issues. She has a hx of TIA, with no previously identified hx of hypercoagulability. Given her history of unexplained TIAs, the following were orderred at last visit: antithrombin III, Factor V Leiden, Protein C, and Protein S. All were within normal limits but protein C and S labs were not done so they will be reordered. Anticardiolipin ab also ordered today.  Patient also mentioned hx of miscarriages and hx of miscarriages within her family. Workup for antiphospholipid syndrome should be considered in addition to workup for hypercoagulability. Given concern for venous thrombosis she has agreed to stop her use of hormone replacement therapy.     Review of Systems   Constitutional: Negative for chills and fever.   HENT: Negative for hearing loss and sore throat.    Eyes: Negative for blurred vision and double vision.   Respiratory: Negative for cough and shortness of breath.    Cardiovascular: Negative for chest pain and palpitations.   Gastrointestinal: Positive for abdominal pain. Negative for nausea and vomiting.   Genitourinary: Negative for dysuria and hematuria.   Musculoskeletal: Negative for falls and myalgias.   Skin: Negative for itching and rash.   Neurological: Positive for headaches. Negative for loss of consciousness.   Endo/Heme/Allergies: Negative for polydipsia. Does not bruise/bleed easily.   Psychiatric/Behavioral: Negative for depression and memory loss. The patient does not have insomnia.          Patient Active Problem List    Diagnosis Date Noted   • Fatigue 10/25/2021   •  "Migraine 10/25/2021   • Hormone replacement therapy 10/25/2021   • History of TIA (transient ischemic attack) 10/25/2021   • Depression 06/15/2021   • Gastroparesis 05/18/2021   • Vitamin D deficiency 05/18/2021   • Abdominal pain 03/18/2018   • Narcotic dependence (HCC) 12/25/2015   • Anemia 12/25/2015   • Chronic back pain 07/21/2011   • Seasonal allergies 07/21/2011   • Acne 07/21/2011   • Hypothyroidism 07/21/2011       Social History     Tobacco Use   • Smoking status: Never Smoker   • Smokeless tobacco: Never Used   • Tobacco comment: exposed to secondhand smoke in childhood   Vaping Use   • Vaping Use: Never used   Substance Use Topics   • Alcohol use: Yes     Alcohol/week: 0.6 oz     Types: 1 Shots of liquor per week   • Drug use: No       Current Outpatient Medications   Medication Sig Dispense Refill   • furosemide (LASIX) 20 MG Tab Take 1 Tablet by mouth every day. 90 Tablet 0   • DULoxetine (CYMBALTA) 30 MG Cap DR Particles Take 3 Capsules by mouth every day. 180 Capsule 1   • buPROPion (WELLBUTRIN XL) 300 MG XL tablet BUPROPION HCL ER (XL) 300 MG XR24H-TAB     • gabapentin (NEURONTIN) 100 MG Cap PLEASE SEE ATTACHED FOR DETAILED DIRECTIONS     • ibuprofen (MOTRIN) 800 MG Tab      • metoclopramide (REGLAN) 10 MG Tab METOCLOPRAMIDE HCL 10 MG TABS     • ondansetron (ZOFRAN ODT) 4 MG TABLET DISPERSIBLE ONDANSETRON 4 MG TBDP     • docusate sodium (COLACE) 100 MG Cap Take 100 mg by mouth every morning.     • levothyroxine (SYNTHROID) 100 MCG Tab Take 100 mcg by mouth Every morning on an empty stomach. Must have Synthroid- brand name- not levothyroxine       No current facility-administered medications for this visit.       /87 (BP Location: Right arm, Patient Position: Sitting, BP Cuff Size: Adult)   Pulse (!) 104   Temp 36.5 °C (97.7 °F) (Temporal)   Ht 1.651 m (5' 5\")   Wt 73.5 kg (162 lb)   LMP 12/01/2011 (Approximate)   BMI 26.96 kg/m²     Physical Exam  General: Well developed, well nourished " female, in no distress.  Eyes: Conjuntiva without any obvious injection or erythema.   Cardiovascular: Heart is regular with no murmurs  Lungs: Clear to auscultation bilaterally. No wheezes, rhonci or crackles heard. Respiratory effort is normal.  Abd: Soft, mild tenderness on palpation of RUQ and RLQ  Ext: No edema        Assessment and Plan    1. Fatigue, unspecified type  -TSH and CBC ordered  -patient reports increased life stressors lately, including divorce    2. Migraine without status migrainosus, not intractable, unspecified migraine type  -duloxetine dose increased to 90 mg    3. Hypothyroidism, unspecified type  -recheck TSH given energy changes    4. Gastroparesis  -reglan 10 mg tid    5. Vitamin D deficiency  -continue D3 supplements    6. Depression, unspecified depression type  -duloxetine dose increased to 90 mg     7. Hormone replacement therapy  -discontinued, given unclear etiology of multiple TIAs    8. History of TIA (transient ischemic attack)  -antithrombin III and factor V Leiden are negative  -Protein C and S pending  -Anticardiolipin ordered  -consider workup for antiphospholipid syndrome after initial hypercoag workup    [facility] patient case was seen by Dr. Beach    Follow up w/ Dr. Ferguson in 3 months.    Signed by: Hamzah Ferguson M.D.

## 2021-10-28 NOTE — TELEPHONE ENCOUNTER
Spoke with pt and she stated that you discussed starting her on medications for migraines. She would like a call from you for a better understanding.

## 2021-11-05 ENCOUNTER — TELEPHONE (OUTPATIENT)
Dept: INTERNAL MEDICINE | Facility: OTHER | Age: 41
End: 2021-11-05

## 2021-11-05 NOTE — TELEPHONE ENCOUNTER
VOICEMAIL  1. Caller Name: Kathleen Alvarado                      Call Back Number: [phone]    2. Message: pt lm wanting a call back her insurance is denying her Bupropion, they want to know why she is to take 2 a day.    3. Patient approves office to leave a detailed voicemail/MyChart message: yes and no          Attempted to call the pt back to get more information but her vm was full.

## 2021-11-19 RX ORDER — METOCLOPRAMIDE 10 MG/1
TABLET ORAL
Qty: 270 TABLET | Refills: 0 | Status: SHIPPED | OUTPATIENT
Start: 2021-11-19 | End: 2021-12-06 | Stop reason: SDUPTHER

## 2021-11-19 NOTE — TELEPHONE ENCOUNTER
Metoclopramide Refill     Received request via: Pharmacy    Was the patient seen in the last year in this department? Yes 9/17/21 [place]    Does the patient have an active prescription (recently filled or refills available) for medication(s) requested? No

## 2021-11-22 RX ORDER — FUROSEMIDE 20 MG/1
20 TABLET ORAL DAILY
Qty: 90 TABLET | Refills: 0 | OUTPATIENT
Start: 2021-11-22

## 2021-11-22 NOTE — TELEPHONE ENCOUNTER
Furosemide Refill    Received request via: Pharmacy    Was the patient seen in the last year in this department? Yes 10/25/21    Does the patient have an active prescription (recently filled or refills available) for medication(s) requested? No

## 2021-12-06 RX ORDER — METOCLOPRAMIDE 10 MG/1
TABLET ORAL
Qty: 270 TABLET | Refills: 0 | Status: SHIPPED | OUTPATIENT
Start: 2021-12-06 | End: 2022-02-14

## 2021-12-06 NOTE — TELEPHONE ENCOUNTER
Last seen: 10/25/2021 by Dr. Ferguson  Next appt: None    Was the patient seen in the last year in this department? Yes   Does patient have an active prescription for medications requested? No   Received Request Via: Pharmacy

## 2021-12-13 ENCOUNTER — APPOINTMENT (OUTPATIENT)
Dept: URGENT CARE | Facility: PHYSICIAN GROUP | Age: 41
End: 2021-12-13
Payer: COMMERCIAL

## 2021-12-18 ENCOUNTER — HOSPITAL ENCOUNTER (EMERGENCY)
Facility: MEDICAL CENTER | Age: 41
End: 2021-12-18
Attending: EMERGENCY MEDICINE
Payer: COMMERCIAL

## 2021-12-18 ENCOUNTER — APPOINTMENT (OUTPATIENT)
Dept: RADIOLOGY | Facility: MEDICAL CENTER | Age: 41
End: 2021-12-18
Attending: EMERGENCY MEDICINE
Payer: COMMERCIAL

## 2021-12-18 VITALS
WEIGHT: 156.53 LBS | OXYGEN SATURATION: 99 % | RESPIRATION RATE: 16 BRPM | BODY MASS INDEX: 28.8 KG/M2 | DIASTOLIC BLOOD PRESSURE: 84 MMHG | TEMPERATURE: 97.6 F | HEIGHT: 62 IN | HEART RATE: 82 BPM | SYSTOLIC BLOOD PRESSURE: 126 MMHG

## 2021-12-18 DIAGNOSIS — S06.0X0A CONCUSSION WITHOUT LOSS OF CONSCIOUSNESS, INITIAL ENCOUNTER: ICD-10-CM

## 2021-12-18 DIAGNOSIS — R11.2 NON-INTRACTABLE VOMITING WITH NAUSEA, UNSPECIFIED VOMITING TYPE: ICD-10-CM
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
## 2021-12-18 PROCEDURE — 72125 CT NECK SPINE W/O DYE: CPT

## 2021-12-18 PROCEDURE — 700111 HCHG RX REV CODE 636 W/ 250 OVERRIDE (IP): Performed by: EMERGENCY MEDICINE

## 2021-12-18 PROCEDURE — A9270 NON-COVERED ITEM OR SERVICE: HCPCS | Performed by: EMERGENCY MEDICINE

## 2021-12-18 PROCEDURE — 70450 CT HEAD/BRAIN W/O DYE: CPT

## 2021-12-18 PROCEDURE — 96374 THER/PROPH/DIAG INJ IV PUSH: CPT

## 2021-12-18 PROCEDURE — 99284 EMERGENCY DEPT VISIT MOD MDM: CPT

## 2021-12-18 PROCEDURE — 700102 HCHG RX REV CODE 250 W/ 637 OVERRIDE(OP): Performed by: EMERGENCY MEDICINE

## 2021-12-18 PROCEDURE — 96375 TX/PRO/DX INJ NEW DRUG ADDON: CPT

## 2021-12-18 RX ORDER — NAPROXEN 500 MG/1
500 TABLET ORAL 2 TIMES DAILY WITH MEALS
Qty: 60 TABLET | Refills: 0 | Status: SHIPPED | OUTPATIENT
Start: 2021-12-18 | End: 2022-01-26

## 2021-12-18 RX ORDER — ONDANSETRON 2 MG/ML
4 INJECTION INTRAMUSCULAR; INTRAVENOUS ONCE
Status: COMPLETED | OUTPATIENT
Start: 2021-12-18 | End: 2021-12-18

## 2021-12-18 RX ORDER — PROMETHAZINE HYDROCHLORIDE 25 MG/1
25 SUPPOSITORY RECTAL ONCE
Status: COMPLETED | OUTPATIENT
Start: 2021-12-18 | End: 2021-12-18

## 2021-12-18 RX ORDER — KETOROLAC TROMETHAMINE 30 MG/ML
30 INJECTION, SOLUTION INTRAMUSCULAR; INTRAVENOUS ONCE
Status: COMPLETED | OUTPATIENT
Start: 2021-12-18 | End: 2021-12-18

## 2021-12-18 RX ORDER — PROMETHAZINE HYDROCHLORIDE 25 MG/1
25 SUPPOSITORY RECTAL EVERY 8 HOURS PRN
Qty: 15 SUPPOSITORY | Refills: 0 | Status: SHIPPED | OUTPATIENT
Start: 2021-12-18 | End: 2023-08-29

## 2021-12-18 RX ORDER — ONDANSETRON 4 MG/1
4 TABLET, ORALLY DISINTEGRATING ORAL EVERY 6 HOURS PRN
Qty: 10 TABLET | Refills: 0 | Status: SHIPPED | OUTPATIENT
Start: 2021-12-18 | End: 2022-01-26

## 2021-12-18 RX ADMIN — ONDANSETRON 4 MG: 2 INJECTION INTRAMUSCULAR; INTRAVENOUS at 17:29

## 2021-12-18 RX ADMIN — PROMETHAZINE HYDROCHLORIDE 25 MG: 25 SUPPOSITORY RECTAL at 18:06

## 2021-12-18 RX ADMIN — KETOROLAC TROMETHAMINE 30 MG: 30 INJECTION, SOLUTION INTRAMUSCULAR at 17:59

## 2021-12-18 ASSESSMENT — FIBROSIS 4 INDEX: FIB4 SCORE: 0.66

## 2021-12-18 ASSESSMENT — PAIN DESCRIPTION - PAIN TYPE
TYPE: ACUTE PAIN

## 2021-12-18 NOTE — ED TRIAGE NOTES
"Presents complaining of headache, and neck ache after incurring a GLF, on a concrete driveway, this past Thursday. She reports experiencing episodic N/V recurring since approximately 1400 today.  Chief Complaint   Patient presents with   • T-5000 FALL   • Headache   • N/V   /94   Pulse (!) 110   Temp 36.1 °C (97 °F) (Temporal)   Resp 20   Ht 1.575 m (5' 2\")   Wt 71 kg (156 lb 8.4 oz)   LMP 12/01/2011 (Approximate)   SpO2 100%   BMI 28.63 kg/m²   Has this patient been vaccinated for COVID YES    "

## 2021-12-19 NOTE — ED NOTES
IV started with blood drawn and sent to lab    Patient medicated as ordered    Patient now to CT Scan via [facility].

## 2021-12-19 NOTE — ED PROVIDER NOTES
ED Provider Note    Scribed for Cuca Orta M.D. by Marcy Betancourt. 12/18/2021, 4:52 PM.    Primary care provider: Hamzah Ferguson M.D.  Means of arrival: Walk-In  History obtained from: Patient  History limited by: None    CHIEF COMPLAINT  Chief Complaint   Patient presents with   • T-5000 FALL   • Headache   • N/V       HPI  Kathleen Alvarado is a 41 y.o. female who presents to the Emergency Department for evaluation of a ground level fall onset Thursday. She states she slipped and fell on ice on a concrete driveway. She denies loss consciousness. She has associated neck pain, pain in the upper half of the back of her head, headache, lightheadedness, and shakiness but denies weakness to the arms or legs. She has been vomiting since 11 AM and is actively doing so in the ED. She currently takes thyroid medication and antidepressants for hormonal control. She is taking Ondansetron for nausea. She denies other medical issues or chances of pregnancy. She is in a c-collar.     REVIEW OF SYSTEMS  HEENT: Head pain, headache, lightheadedness. No loss of consciousness.   GI: Vomiting.  Back: Neck pain.   Neuro: Shakiness. No extremity weakness.     See history of present illness. All other systems are negative    PAST MEDICAL HISTORY   has a past medical history of Allergy, Anesthesia, Anxiety, Arthritis, Bowel habit changes, Cold (05/03/2019), High cholesterol (08/2018), History of anemia, endometriosis, Migraine, Pain (05/2019), Pneumonia (12/2018), Polycystic ovaries, PONV (postoperative nausea and vomiting), Psychiatric problem, Seizure (HCC) (06/2017), Stroke ([facility]) (08/2018), and Thyroid condition.    SURGICAL HISTORY   has a past surgical history that includes laparoscopy (11/15/2007); tonsillectomy (2000); sandra by laparoscopy (6/6/2009); cholecystoenterostomy (2002); carpal tunnel release (3/24/2009); hysteroscopy with video operative (4/7/08); pelviscopy (9/3/08); lysis adhesions gyn (9/3/08); primary c  section (1/13/2011); vaginal hysterectomy total (4/4/2011); cystoscopy (4/4/2011); abdominoplasty (4/28/2011); liposuction (4/28/2011); breast implant revision (10/11/2011); mastopexy (10/11/2011); scar revision (10/11/2011); pelviscopy (4/3/2013); laparoscopy (1/15/2014); laparoscopy robotic xi (9/1/2015); colon resection robotic (Right, 12/7/2015); groin exploration (Left, 8/30/2016); ventral hernia repair (8/30/2016); inguinal hernia repair robotic (Left, 9/27/2017); low anterior resection robotic xi (N/A, 3/14/2018); suspension of breast (Bilateral, 5/13/2019); breast implant revision (Bilateral, 5/13/2019); capsulectomy (Right, 5/13/2019); mammoplasty augmentation (Bilateral, 2000); breast implant revision (Left, 7/24/2019); capsulectomy (N/A, 7/24/2019); breast reconstruction (N/A, 7/24/2019); hysterectomy, total abdominal; lap,diagnostic abdomen (8/12/2020); and laparoscopic lysis of adhesions (8/12/2020).    SOCIAL HISTORY  Social History     Tobacco Use   • Smoking status: Never Smoker   • Smokeless tobacco: Never Used   • Tobacco comment: exposed to secondhand smoke in childhood   Vaping Use   • Vaping Use: Never used   Substance Use Topics   • Alcohol use: Yes     Alcohol/week: 0.6 oz     Types: 1 Shots of liquor per week     Comment: ocasionally   • Drug use: No      Social History     Substance and Sexual Activity   Drug Use No       FAMILY HISTORY  Family History   Problem Relation Age of Onset   • Arthritis Father         gout   • Heart Disease Paternal Grandfather    • Hypertension Other    • Stroke Other        CURRENT MEDICATIONS  Current Outpatient Medications   Medication Instructions   • buPROPion (WELLBUTRIN XL) 300 MG XL tablet BUPROPION HCL ER (XL) 300 MG XR24H-TAB   • docusate sodium (COLACE) 100 mg, Oral, EVERY MORNING   • DULoxetine (CYMBALTA) 90 mg, Oral, DAILY   • furosemide (LASIX) 20 mg, Oral, DAILY   • gabapentin (NEURONTIN) 100 MG Cap PLEASE SEE ATTACHED FOR DETAILED DIRECTIONS   •  "ibuprofen (MOTRIN) 800 MG Tab No dose, route, or frequency recorded.   • levothyroxine (SYNTHROID) 100 mcg, Oral, EACH MORNING ON EMPTY STOMACH, Must have Synthroid- brand name- not levothyroxine   • metoclopramide (REGLAN) 10 MG Tab TAKE ONE TABLET THREE TIMES DAILY PRIOR TO MEALS   • ondansetron (ZOFRAN ODT) 4 MG TABLET DISPERSIBLE PLACE 1 TABLET UNDER THE TONGUE EVERY 4 TO 6 HRS AS NEEDED FOR NAUSEA.     ALLERGIES  Allergies   Allergen Reactions   • Clindamycin Myalgia     Joints swell   • Methylprednisolone      Increased heart rate, into a-fib.   • Morphine Swelling     Swelling; redness   • Norco [Apap-Fd&C Blue #1-Hydrocodone] Rash     Trouble breathing   • Tramadol Palpitations     palpitations   • Demerol Rash     Rash   • Dilaudid [Hydromorphone] Hives and Unspecified     \"Makes my entire body tight\"  Hydrocodone     • Latex Itching     Itch, rash   • Other Misc Itching     \"All Metals\" Blisters   • Sulfa Drugs Hives, Rash and Anaphylaxis     Also feel \"high\"   • Tape Rash     Paper tape ok for IVs   • Clindamycin Hcl      Other reaction(s): Unknown   • Kenalog      Other reaction(s): Unknown   • Levaquin      Other reaction(s): Facial swelling   • Trazodone Hives     Hives       PHYSICAL EXAM  VITAL SIGNS: /94   Pulse (!) 110   Temp 36.1 °C (97 °F) (Temporal)   Resp 20   Ht 1.575 m (5' 2\")   Wt 71 kg (156 lb 8.4 oz)   LMP 12/01/2011 (Approximate)   SpO2 100%   BMI 28.63 kg/m²     Constitutional: GCS 15, ABC's intact.  HENT: Head trauma. No post scalp hematoma. Trachea is midline. No facial instability or malocclusion.   Eyes: EOMI, Conjunctiva normal, No discharge. Pupils are slightly dilated but reactive.  Neck: Mild CSpine tenderness.    Cardiovascular: Normal heart rate, Normal rhythm, No murmurs, No rubs, No gallops.   Thorax & Lungs: Normal breath sounds, No respiratory distress, No wheezing, No chest tenderness. No subcutaneous emphysema or paradoxical chest wall movements.   Abdomen: " Bowel sounds normal, Soft, No tenderness, No masses, No pulsatile masses, no abdominal wall contusions. Active vomiting.   Skin: Warm, Dry, No erythema, No rash no contusions or abrasions   Extremities: Intact distal pulses, No edema, No tenderness, No cyanosis, No clubbing.   Neurologic: Alert & oriented x 3, Normal motor function, Normal sensory function, No focal deficits noted. Normal strength 5x5 bilaterally.    RADIOLOGY  CT-HEAD W/O   Final Result      1.  Head CT without contrast within normal limits. No evidence of acute cerebral infarction, hemorrhage or mass lesion.         CT-CSPINE WITHOUT PLUS RECONS   Final Result      1.  There is no acute fracture of the cervical spine.           The radiologist's interpretation of all radiological studies have been reviewed by me.    COURSE & MEDICAL DECISION MAKING  Pertinent Labs & Imaging studies reviewed. (See chart for details)    4:52 PM - Patient seen and examined in the trauma bay. Patient will be treated with Zofran 4 mg. Ordered CT-Head and CT-Cspine to evaluate her symptoms. Differential diagnoses include but are not limited to Concussion vs. Intercranial hemorrhage. Neck sprain vs. Fracture.    5:55 PM - Patient will be treated with Toradol 30 mg.     5:58 PM - Patient was reevaluated at bedside. Updated on imaging and labs as shown above. Patient does not have a skull fracture. Patient is advised to take rest for the next few days. Patient will be discharged once nausea and vomiting resolve. Patient will be treated with Phenergan 25 mg for nausea.     The patient will return for new or worsening symptoms and is stable at the time of discharge.      DISPOSITION:  Patient will be discharged home in stable condition.    FOLLOW UP:  Hamzah Ferguson M.D.  [address] [facility] [id number] [phone]    Call in 2 days  for recheck      OUTPATIENT MEDICATIONS:  New Prescriptions    NAPROXEN (NAPROSYN) 500 MG TAB    Take 1 Tablet by mouth 2 times a day  with meals.    ONDANSETRON (ZOFRAN ODT) 4 MG TABLET DISPERSIBLE    Take 1 Tablet by mouth every 6 hours as needed for Nausea.    PROMETHAZINE (PHENERGAN) 25 MG SUPPOS    Insert 1 Suppository into the rectum every 8 hours as needed for Nausea/Vomiting for up to 15 doses.        FINAL IMPRESSION  1. Fall, initial encounter    2. Concussion without loss of consciousness, initial encounter    3. Non-intractable vomiting with nausea, unspecified vomiting type           I, Marcy Betancourt (Saji), am scribing for, and in the presence of, Cuca Orta M.D..    Electronically signed by: Marcy Betancourt (Saji), 12/18/2021    ICuca M.D. personally performed the services described in this documentation, as scribed by Marcy Betancourt in my presence, and it is both accurate and complete.    The note accurately reflects work and decisions made by me.  Cuca Orta M.D.  12/18/2021  6:23 PM

## 2021-12-20 ENCOUNTER — TELEPHONE (OUTPATIENT)
Dept: INTERNAL MEDICINE | Facility: OTHER | Age: 41
End: 2021-12-20

## 2021-12-20 NOTE — TELEPHONE ENCOUNTER
Patient was called today 12/20/21 regarding clear nasal discharge in the setting of recent concussion. Patient was seen recently in ED for concussion and imaging was negative at the time. Patient describes large amount of nasal discharge that raises concern for CSF rhinorrhea and description is not consistent with typical nasal secretions or nasal discharge from rhinitis. She was instructed to present to the ED for further evaluation.

## 2021-12-20 NOTE — TELEPHONE ENCOUNTER
"VOICEMAIL  1. Caller Name: Kathleen Alvarado                      Call Back Number: [phone]    2. Message: patient called and lm stating she was seen at the hospital on Saturday for a \"really bad concussion\" and she is now having clear fluid out her nose, she was informed to call the clinic and speak with provider if this happened.    Please call the patient    3. Patient approves office to leave a detailed voicemail/MyChart message: yes  "

## 2022-01-04 ENCOUNTER — OFFICE VISIT (OUTPATIENT)
Dept: INTERNAL MEDICINE | Facility: OTHER | Age: 42
End: 2022-01-04
Payer: COMMERCIAL

## 2022-01-04 VITALS
BODY MASS INDEX: 28.9 KG/M2 | SYSTOLIC BLOOD PRESSURE: 134 MMHG | DIASTOLIC BLOOD PRESSURE: 72 MMHG | WEIGHT: 158 LBS | OXYGEN SATURATION: 98 % | HEART RATE: 91 BPM | TEMPERATURE: 95.7 F

## 2022-01-04 DIAGNOSIS — F07.81 POST CONCUSSION SYNDROME: Primary | ICD-10-CM

## 2022-01-04 DIAGNOSIS — G47.01 INSOMNIA DUE TO MEDICAL CONDITION: ICD-10-CM

## 2022-01-04 DIAGNOSIS — G43.909 MIGRAINE WITHOUT STATUS MIGRAINOSUS, NOT INTRACTABLE, UNSPECIFIED MIGRAINE TYPE: ICD-10-CM

## 2022-01-04 PROCEDURE — 99213 OFFICE O/P EST LOW 20 MIN: CPT | Mod: GE

## 2022-01-04 RX ORDER — SUMATRIPTAN 25 MG/1
25 TABLET, FILM COATED ORAL
Qty: 10 TABLET | Refills: 3 | Status: SHIPPED | OUTPATIENT
Start: 2022-01-04 | End: 2022-01-26

## 2022-01-04 ASSESSMENT — FIBROSIS 4 INDEX: FIB4 SCORE: 0.66

## 2022-01-04 ASSESSMENT — PATIENT HEALTH QUESTIONNAIRE - PHQ9: CLINICAL INTERPRETATION OF PHQ2 SCORE: 0

## 2022-01-04 NOTE — PROGRESS NOTES
"    Established Patient    Patient Care Team:  Hamzah Ferguson M.D. as PCP - General (Internal Medicine)    HPI:  Kathleen Alvarado is a 41 y.o. female who presents today with the following Chief Complaint(s): Follow up for The primary encounter diagnosis was Post concussion syndrome. Diagnoses of Migraine without status migrainosus, not intractable, unspecified migraine type and Insomnia due to medical condition were also pertinent to this visit.     40 yo F with PMH of endometriosis, gastroparesis, more than 30 abdominal/pelvic surgeries, ovarian rupture, TIA (7 total with last 2 y ago), and bowel obstruction leading to colon removal, presenting with post hospital follow up.      Patient slipped on ice on 12/18 and hit the back of her head. She reports she had LOC for 30 sec - 1 min. She went to ED where she had a CT Head that showed no evidence of bleed. Her immediate course was complicated by intractable vomiting. PO and IV antiemetics did not work. Ultimately found that phenergan suppository worked. She was discharged on that and naproxen for head pain.     Since then, she has struggled with ongoing n/v, severe headaches that she describes as migraines, motion sickness, and insomnia.     Regarding her nausea/vomiting-  She states this is primarily associated with vertigo and migraine.  Vomitted 8 times yesterday. She saw her GI doctor yesterday, Dr. Bah and is getting a CT abd on Thursday and will be doing a 7 day bowel rest with clear liquids only under the guidance of Dr. Bah.    Regarding her head pain/migraine- describes as stabbing, shooting pain in center of back of head with \"blocked out vision on the top.\"  Happens every couple days. She has a remote H/o migraines last one five years ago, however she states that these headaches feel different.     Regarding her motion sickness- happends when turning head, sitting up. Does not feel like room is spinning, rather like she is rocking on a boat. Has " "been taking  Bonine and dramamine 1 week which is not helping.    Regarding her Insomnia- previously had good sleep routine 9 months ago. Since her concussion sleeping 15 hrs then not able to sleep. Primarily with difficulty falling asleep.      ROS:       Review of Systems   Constitutional: Negative for chills, fever and weight loss.   HENT: Negative for congestion, ear discharge and nosebleeds.    Eyes:        Intermittent superior hemianopia    Respiratory: Negative for cough and shortness of breath.    Cardiovascular: Negative for chest pain, palpitations and orthopnea.   Gastrointestinal: Positive for abdominal pain, nausea and vomiting. Negative for blood in stool.   Genitourinary: Negative for dysuria and hematuria.   Musculoskeletal: Negative for back pain, myalgias and neck pain.   Neurological: Positive for headaches. Negative for dizziness, tingling, tremors, sensory change, focal weakness and seizures.   Psychiatric/Behavioral: Negative for depression, hallucinations and memory loss. The patient has insomnia.          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 7/22/19-resolved after colon surgery.   • Cold 05/03/2019    Sinus infection, beginning of April, better now, denies SOB, productive cough   • High cholesterol 08/2018    History of, related colon surgery-no meds   • History of anemia    • Hx of endometriosis    • Migraine    • Pain 05/2019    stomach, lower back   • Pneumonia 12/2018   • Polycystic ovaries     abn menses, vag bleeding x 2 months- has now had hysterectomy   • PONV (postoperative nausea and vomiting)    • Psychiatric problem     depression, anxiety   • Seizure ([facility]) 06/2017    d/t \"thyroid level being off\"   • Stroke ([facility]) 08/2018    Slight right sided weakness.  (Stroke related to colon surgery)-7/22/19-RESOLVED.    • Thyroid condition     " "hypothyroid     Social History     Tobacco Use   • Smoking status: Never Smoker   • Smokeless tobacco: Never Used   • Tobacco comment: exposed to secondhand smoke in childhood   Vaping Use   • Vaping Use: Never used   Substance Use Topics   • Alcohol use: Yes     Alcohol/week: 0.6 oz     Types: 1 Shots of liquor per week     Comment: ocasionally   • Drug use: No     Current Outpatient Medications   Medication Sig Dispense Refill   • SUMAtriptan (IMITREX) 25 MG Tab tablet Take 1 Tablet by mouth one time as needed for Migraine for up to 1 dose. 10 Tablet 3   • promethazine (PHENERGAN) 25 MG Suppos Insert 1 Suppository into the rectum every 8 hours as needed for Nausea/Vomiting for up to 15 doses. 15 Suppository 0   • ondansetron (ZOFRAN ODT) 4 MG TABLET DISPERSIBLE Take 1 Tablet by mouth every 6 hours as needed for Nausea. 10 Tablet 0   • naproxen (NAPROSYN) 500 MG Tab Take 1 Tablet by mouth 2 times a day with meals. 60 Tablet 0   • DULoxetine (CYMBALTA) 30 MG Cap DR Particles TAKE 3 CAPSULES BY MOUTH EVERY  Capsule 0   • metoclopramide (REGLAN) 10 MG Tab TAKE ONE TABLET THREE TIMES DAILY PRIOR TO MEALS 270 Tablet 0   • ondansetron (ZOFRAN ODT) 4 MG TABLET DISPERSIBLE PLACE 1 TABLET UNDER THE TONGUE EVERY 4 TO 6 HRS AS NEEDED FOR NAUSEA. 10 Tablet 0   • buPROPion (WELLBUTRIN XL) 150 MG XL tablet Take 300 mg by mouth every morning as needed (\"energy\").     • ibuprofen (MOTRIN) 800 MG Tab Take 800 mg by mouth every 8 hours as needed. Indications: Pain     • levothyroxine (SYNTHROID) 100 MCG Tab Take 100 mcg by mouth Every morning on an empty stomach. Must have Synthroid- brand name- not levothyroxine       No current facility-administered medications for this visit.       Physical Exam:  /72   Pulse 91   Temp (!) 35.4 °C (95.7 °F)   Wt 71.7 kg (158 lb)   LMP 12/01/2011 (Approximate)   SpO2 98%   BMI 28.90 kg/m²   General: Well developed, well nourished female, in no distress.  Head: Normocephalic, " atraumatic  Eyes: Conjuntiva without any obvious injection or erythema. Rashaun-Hallpike maneuver negative for nystagmus.   Cardiovascular: Heart is regular with out murmur  Lungs: Clear to auscultation bilaterally. No wheezes, rhonci or crackles heard. Respiratory effort is normal.  Abd: Soft, non-tender  Ext: No edema  Physical Exam  Neurological:      General: No focal deficit present.      Mental Status: She is oriented to person, place, and time.      Cranial Nerves: No cranial nerve deficit.      Sensory: No sensory deficit.      Coordination: Coordination normal.      Deep Tendon Reflexes: Reflexes normal.         Assessment and Plan:   Problem List Items Addressed This Visit     Post concussion syndrome - Primary     Patient continuing to have multiple symptoms post concussion.   She is following with her GI doctor for her n/v and is planning bowel rest.   Given her ongoing headaches and superior hemaniopia would like to repeat CT head to rule chronic bleed- low suspicion given clinically does not have any focal neurologlical deficits.    Otherwise will offer symptomatic management.   Defer to GI for GI symptoms. Continue phenergan suppository.  Recommend using ibuprofen (crushed) for pain and first line for headache  Stop naproxyn.  Sumatriptan if needed for persistent migraine.  Follow up in 4-6 weeks with PCP to see how things are going.         Relevant Medications    SUMAtriptan (IMITREX) 25 MG Tab tablet    Other Relevant Orders    CT-HEAD W/O    Migraine     2/2 to post concussion syndrome  Ibuprofen and imitrex PRN  Head CT w/o contrast         Relevant Medications    SUMAtriptan (IMITREX) 25 MG Tab tablet    Insomnia due to medical condition     2/2 to post concussion syndrome.  Hopefully will start to improve over the next couple weeks as other symptoms start to resolve.   Will hold off on pharmaceutical therapy for now.   Encouraged continue practicing good sleep hygiene.   Follow up in 4-6 weeks to  see how things are going. Can reassess if pharmaceutical therapy appropriate at that time.                Return in about 4 weeks (around 2/1/2022).      Rebecca Ramirez M.D. PGY1  [facility]

## 2022-01-04 NOTE — PATIENT INSTRUCTIONS
It was great meeting you Kathleen!  I am so sorry that you are still having symptoms of your concussion.    Today we discussed your symptoms of recurrent motion sickness, nausea/vomiting, and migraine type headaches.    We will defer to your GI doctor for the antiemetic and plan for bowel rest over the next 7 days.    Regarding your migraine headaches recommend that you resume your ibuprofen regimen 800 mg as needed for headaches.  Crush these up and put them in clear liquid of your choice.  If after 1 hour your symptoms have not resolved you can take a sumatriptan (Imitrex) as an abortive for your headache.    If you find that the Imitrex is not resolving your headache symptoms please call the office and there is room to increase the dose.  Do not take more than 2-3 doses of Imitrex per week and no more than 10-12 doses per month.    In the meantime we will also get a repeat CT head scan to make sure with there is no chronic bleed.    We also discussed previous labs that were ordered at your last visit but have not been reviewed.  Recommend scheduling a visit with Dr. Ferguson in the next couple weeks to go over these.    Follow-up in 2 to 4 weeks, sooner if needed.  Someone from our office will contact you if there is anything concerning found on your CT scan.

## 2022-01-05 PROBLEM — G47.01 INSOMNIA DUE TO MEDICAL CONDITION: Status: ACTIVE | Noted: 2022-01-05

## 2022-01-05 PROBLEM — F07.81 POST CONCUSSION SYNDROME: Status: ACTIVE | Noted: 2022-01-05

## 2022-01-05 ASSESSMENT — ENCOUNTER SYMPTOMS
TINGLING: 0
MEMORY LOSS: 0
VOMITING: 1
SENSORY CHANGE: 0
ORTHOPNEA: 0
WEIGHT LOSS: 0
ABDOMINAL PAIN: 1
BLOOD IN STOOL: 0
SEIZURES: 0
COUGH: 0
FEVER: 0
NECK PAIN: 0
NAUSEA: 1
MYALGIAS: 0
DIZZINESS: 0
HALLUCINATIONS: 0
PALPITATIONS: 0
SHORTNESS OF BREATH: 0
HEADACHES: 1
CHILLS: 0
DEPRESSION: 0
TREMORS: 0
BACK PAIN: 0
INSOMNIA: 1
FOCAL WEAKNESS: 0

## 2022-01-05 NOTE — ASSESSMENT & PLAN NOTE
2/2 to post concussion syndrome.  Hopefully will start to improve over the next couple weeks as other symptoms start to resolve.   Will hold off on pharmaceutical therapy for now.   Encouraged continue practicing good sleep hygiene.   Follow up in 4-6 weeks to see how things are going. Can reassess if pharmaceutical therapy appropriate at that time.

## 2022-01-05 NOTE — ASSESSMENT & PLAN NOTE
Patient continuing to have multiple symptoms post concussion.   She is following with her GI doctor for her n/v and is planning bowel rest.   Given her ongoing headaches and superior hemaniopia would like to repeat CT head to rule chronic bleed- low suspicion given clinically does not have any focal neurologlical deficits.    Otherwise will offer symptomatic management.   Defer to GI for GI symptoms. Continue phenergan suppository.  Recommend using ibuprofen (crushed) for pain and first line for headache  Stop naproxyn.  Sumatriptan if needed for persistent migraine.  Follow up in 4-6 weeks with PCP to see how things are going.

## 2022-01-16 ENCOUNTER — HOSPITAL ENCOUNTER (OUTPATIENT)
Dept: RADIOLOGY | Facility: MEDICAL CENTER | Age: 42
End: 2022-01-16
Attending: INTERNAL MEDICINE
Payer: COMMERCIAL

## 2022-01-16 DIAGNOSIS — R10.31 RLQ ABDOMINAL PAIN: ICD-10-CM

## 2022-01-16 DIAGNOSIS — N80.9 ENDOMETRIOSIS: ICD-10-CM

## 2022-01-16 DIAGNOSIS — K91.89 POSTCHOLECYSTECTOMY DIARRHEA: ICD-10-CM

## 2022-01-16 DIAGNOSIS — R10.11 RUQ ABDOMINAL PAIN: ICD-10-CM

## 2022-01-16 DIAGNOSIS — R19.7 POSTCHOLECYSTECTOMY DIARRHEA: ICD-10-CM

## 2022-01-16 DIAGNOSIS — R10.32 LLQ ABDOMINAL PAIN: ICD-10-CM

## 2022-01-16 DIAGNOSIS — R63.4 LOSS OF WEIGHT: ICD-10-CM

## 2022-01-16 DIAGNOSIS — R10.13 EPIGASTRIC PAIN: ICD-10-CM

## 2022-01-16 DIAGNOSIS — K59.00 CONSTIPATION, UNSPECIFIED CONSTIPATION TYPE: ICD-10-CM

## 2022-01-16 DIAGNOSIS — R11.2 NAUSEA AND VOMITING, INTRACTABILITY OF VOMITING NOT SPECIFIED, UNSPECIFIED VOMITING TYPE: ICD-10-CM

## 2022-01-16 DIAGNOSIS — R63.0 DECREASED APPETITE: ICD-10-CM

## 2022-01-16 DIAGNOSIS — R11.0 NAUSEA: ICD-10-CM

## 2022-01-16 DIAGNOSIS — R13.14 DYSPHAGIA, PHARYNGOESOPHAGEAL PHASE: ICD-10-CM

## 2022-01-16 DIAGNOSIS — K25.9 GASTRIC ULCER, UNSPECIFIED CHRONICITY, UNSPECIFIED WHETHER GASTRIC ULCER HEMORRHAGE OR PERFORATION PRESENT: ICD-10-CM

## 2022-01-16 PROCEDURE — 700117 HCHG RX CONTRAST REV CODE 255: Performed by: INTERNAL MEDICINE

## 2022-01-16 PROCEDURE — 74177 CT ABD & PELVIS W/CONTRAST: CPT

## 2022-01-16 RX ADMIN — IOHEXOL 90 ML: 350 INJECTION, SOLUTION INTRAVENOUS at 14:44

## 2022-01-16 RX ADMIN — IOHEXOL 25 ML: 240 INJECTION, SOLUTION INTRATHECAL; INTRAVASCULAR; INTRAVENOUS; ORAL at 14:45

## 2022-01-18 ENCOUNTER — TELEPHONE (OUTPATIENT)
Dept: INTERNAL MEDICINE | Facility: OTHER | Age: 42
End: 2022-01-18

## 2022-01-18 RX ORDER — BUPROPION HYDROCHLORIDE 300 MG/1
300 TABLET ORAL EVERY MORNING
Qty: 90 TABLET | Refills: 1 | Status: SHIPPED | OUTPATIENT
Start: 2022-01-18 | End: 2022-04-27 | Stop reason: SDUPTHER

## 2022-01-18 RX ORDER — BUPROPION HYDROCHLORIDE 300 MG/1
TABLET ORAL
COMMUNITY
Start: 2021-12-12 | End: 2022-01-18 | Stop reason: ALTCHOICE

## 2022-01-18 NOTE — TELEPHONE ENCOUNTER
1. Caller Name: Pt                      Call Back Number: [phone]     2. Message: Pt lvm requesting refills on her buproprion she states it doesn't matter if it is the 150s or the 300s whatever Dr. Ferguson thinks is fine . She is completely out and needs meds asap    3. Patient approves office to leave a detailed voicemail/MyChart message: N\A

## 2022-01-19 NOTE — TELEPHONE ENCOUNTER
Hamzah Ferguson M.D.  Julia Cabrera, Med Ass't  Caller: Unspecified (Today,  2:34 PM)  Prescription sent

## 2022-01-25 ENCOUNTER — HOSPITAL ENCOUNTER (OUTPATIENT)
Dept: RADIOLOGY | Facility: MEDICAL CENTER | Age: 42
End: 2022-01-25
Payer: COMMERCIAL

## 2022-01-25 DIAGNOSIS — F07.81 POST CONCUSSION SYNDROME: ICD-10-CM

## 2022-01-25 PROBLEM — F41.1 GENERALIZED ANXIETY DISORDER: Status: ACTIVE | Noted: 2022-01-25

## 2022-01-25 PROBLEM — K76.9 HEPATIC LESION: Status: ACTIVE | Noted: 2022-01-25

## 2022-01-25 PROCEDURE — 70450 CT HEAD/BRAIN W/O DYE: CPT

## 2022-01-26 ENCOUNTER — OFFICE VISIT (OUTPATIENT)
Dept: INTERNAL MEDICINE | Facility: OTHER | Age: 42
End: 2022-01-26
Payer: COMMERCIAL

## 2022-01-26 VITALS
DIASTOLIC BLOOD PRESSURE: 82 MMHG | BODY MASS INDEX: 30.73 KG/M2 | TEMPERATURE: 99 F | SYSTOLIC BLOOD PRESSURE: 139 MMHG | WEIGHT: 168 LBS | OXYGEN SATURATION: 98 % | HEART RATE: 86 BPM

## 2022-01-26 DIAGNOSIS — G47.01 INSOMNIA DUE TO MEDICAL CONDITION: ICD-10-CM

## 2022-01-26 DIAGNOSIS — G43.909 MIGRAINE WITHOUT STATUS MIGRAINOSUS, NOT INTRACTABLE, UNSPECIFIED MIGRAINE TYPE: ICD-10-CM

## 2022-01-26 DIAGNOSIS — R10.9 ABDOMINAL PAIN, UNSPECIFIED ABDOMINAL LOCATION: ICD-10-CM

## 2022-01-26 DIAGNOSIS — K76.9 HEPATIC LESION: ICD-10-CM

## 2022-01-26 DIAGNOSIS — K31.84 GASTROPARESIS: ICD-10-CM

## 2022-01-26 DIAGNOSIS — F07.81 POST CONCUSSION SYNDROME: ICD-10-CM

## 2022-01-26 PROCEDURE — 99214 OFFICE O/P EST MOD 30 MIN: CPT | Mod: GC | Performed by: STUDENT IN AN ORGANIZED HEALTH CARE EDUCATION/TRAINING PROGRAM

## 2022-01-26 ASSESSMENT — FIBROSIS 4 INDEX: FIB4 SCORE: 0.66

## 2022-01-26 NOTE — PATIENT INSTRUCTIONS
-Follow up in 1 month with  (your PCP)  -Get blood work done today.  -Get abdominal x ray done today.

## 2022-01-26 NOTE — PROGRESS NOTES
Established Patient    Kathleen presents today with the following:    CC: abdominal pain    HPI:   -last night, she was sleeping and suddenly woke up, with vomiting in mouth with red at first and then vomiting up green and yellow. could not sleep at all due to pain. She did not have any pain throughout the day except for this episode. Today, she tried eating bread but she was so nauseous that she could not eat the bread.  In the past, she states that she had Ileus many times before. Last night pain was much more severe than that or previous abdominal pain that she has chronically. The pain is located In mid abdomen and R lower quadrants, and radiates to her back causing soreness. She denies recent change in diet, no recent use of alcohol.  -She also saw her GI doctor 1/3/22, Dr. Bah and had a CT abd on 1/16/22 which showed extensive postsurgical change, no evidence of bowel obstruction and tiny hypodense hepatic lesion that is too small to characterize, notable for prior cholecystectomy  -she previously did a 7 day bowel rest with clear liquids per 's instruction.  -She feels that insomnia got worse. Stay awake for 36 hours sometimes.  Nausea is still the same. Phenergan helps.  -due to the intensity of pain, patient states that she did not want to go to emergency department due to inability to find someone to watch her kids. She is  and her  is out of town.     Patient Active Problem List    Diagnosis Date Noted   • Generalized anxiety disorder 01/25/2022   • Hepatic lesion 01/25/2022   • Post concussion syndrome 01/05/2022   • Insomnia due to medical condition 01/05/2022   • Fatigue 10/25/2021   • Migraine 10/25/2021   • Hormone replacement therapy 10/25/2021   • History of TIA (transient ischemic attack) 10/25/2021   • Depression 06/15/2021   • Gastroparesis 05/18/2021   • Vitamin D deficiency 05/18/2021   • Abdominal pain 03/18/2018   • Narcotic dependence (HCC) 12/25/2015   •  Chronic back pain 07/21/2011   • Seasonal allergies 07/21/2011   • Acne 07/21/2011   • Hypothyroidism 07/21/2011       Current Outpatient Medications   Medication Sig Dispense Refill   • cyclobenzaprine (FLEXERIL) 10 mg Tab      • pregabalin (LYRICA) 150 MG Cap      • buPROPion (WELLBUTRIN XL) 300 MG XL tablet Take 1 Tablet by mouth every morning. 90 Tablet 1   • promethazine (PHENERGAN) 25 MG Suppos Insert 1 Suppository into the rectum every 8 hours as needed for Nausea/Vomiting for up to 15 doses. 15 Suppository 0   • DULoxetine (CYMBALTA) 30 MG Cap DR Particles TAKE 3 CAPSULES BY MOUTH EVERY  Capsule 0   • metoclopramide (REGLAN) 10 MG Tab TAKE ONE TABLET THREE TIMES DAILY PRIOR TO MEALS 270 Tablet 0   • ondansetron (ZOFRAN ODT) 4 MG TABLET DISPERSIBLE PLACE 1 TABLET UNDER THE TONGUE EVERY 4 TO 6 HRS AS NEEDED FOR NAUSEA. 10 Tablet 0   • ibuprofen (MOTRIN) 800 MG Tab Take 800 mg by mouth every 8 hours as needed. Indications: Pain     • levothyroxine (SYNTHROID) 100 MCG Tab Take 100 mcg by mouth Every morning on an empty stomach. Must have Synthroid- brand name- not levothyroxine       No current facility-administered medications for this visit.       ROS: As per HPI. Additional pertinent systems as noted below.  Constitutional: Negative for chills and fever.   HENT: Negative for ear pain and sore throat.    Eyes: Negative for discharge and redness.   Respiratory: Negative for cough, hemoptysis, wheezing and stridor.    Cardiovascular: Negative for chest pain, palpitations and leg swelling.   Gastrointestinal: Negative for constipation, heartburn,   Genitourinary: Negative for dysuria, and hematuria.   Musculoskeletal: Negative for falls and myalgias.   Skin: Negative for itching and rash.   Neurological: Negative for dizziness, seizures, loss of consciousness and headaches.   Endo/Heme/Allergies: Negative for polydipsia. Does not bruise/bleed easily.   Psychiatric/Behavioral: Negative for substance abuse  and suicidal ideas.       /82 (BP Location: Left arm, Patient Position: Sitting, BP Cuff Size: Adult)   Pulse 86   Temp 37.2 °C (99 °F)   Wt 76.2 kg (168 lb)   LMP 12/01/2011 (Approximate)   SpO2 98%   BMI 30.73 kg/m²     Physical Exam   Constitutional:  oriented to person, place, and time. No distress.   Eyes: Extraocular muscles are intact.No scleral icterus.  Neck: Neck supple. No thyromegaly present.   Cardiovascular: Normal rate, regular rhythm and normal heart sounds.  Exam reveals no gallop and no friction rub.  No murmur heard.  Pulmonary/Chest: Breath sounds normal. Breathing is nonlabored.   Abdomen: tender to palpation in midepigastric area and R lower quadrant. Bowel sounds are present, normo to hyperactive. Guarding present but no rigidity or rebound tenderness.  Musculoskeletal:   no edema.   Lymphadenopathy: no cervical adenopathy  Neurological: alert and oriented to person, place, and time.   Skin: No cyanosis. Nails show no clubbing.      Note: I have reviewed all pertinent labs and diagnostic tests associated with this visit with specific comments listed under the assessment and plan below    Assessment and Plan    42 yo F with PMH of endometriosis, gastroparesis, more than 30 abdominal/pelvic surgeries, ovarian rupture, TIA (7 total with last 2 y ago), and bowel obstruction leading to colon removal, presenting for follow up.    #Abdominal pain  -differentials include gastroenteritis, pancreatitis, small bowel obstruction,psychogenic(underlying somatoform disorder?),etc. Since patient is able to have bowel movement, obstruction and ileus are less likely.  -last night, she was sleeping and suddenly woke up, with vomiting in mouth with red at first and then vomiting up green and yellow. could not sleep at all due to pain. She did not have any pain throughout the day except for this episode. Today, she tried eating bread but she was so nauseous that she could not eat the bread.  In the  "past, she states that she had Ileus many times before. Last night pain was much more severe than that or previous abdominal pain that she has chronically. The pain is located In mid abdomen and R lower quadrants, and radiates to her back causing soreness. She denies recent change in diet, no recent use of alcohol. No fever, chills. Patient also reports one episode of loose stool, was not able to see if there was blood, melena or hematochezia.  -She also saw her GI doctor 1/3/22, Dr. Bah and had a CT abd on 1/16/22 which showed extensive postsurgical change, no evidence of bowel obstruction and tiny hypodense hepatic lesion that is too small to characterize, notable for prior cholecystectomy  -she previously did a 7 day bowel rest with clear liquids per 's instruction.    Plan:  Abdominal x ray stat  Lipase stat  UA stat  Cbc stat  cmp stat  -Continue current regimen of phenergan 25 mg suppository q8h prn and metoclopramide 10 mg q6h prn  along with zofran q6h prn    #postconcussion syndrome  #headache  #nausea and vomiting  #motion sickness  #insomnia  -She feels that insomnia got worse. Stay awake for 36 hours sometimes.  Nausea is still the same. Phenergan helps.  -Patient slipped on ice on 12/18 and hit the back of her head. She reports she had LOC for 30 sec - 1 min. She went to ED where she had a CT Head that showed no evidence of bleed.  Her immediate course was complicated by intractable vomiting. PO and IV antiemetics did not work. Ultimately found that phenergan suppository worked. She was discharged on that and naproxen for head pain. Since then, she has struggled with ongoing n/v, severe headaches that she describes as migraines, motion sickness, and insomnia. describes as stabbing, shooting pain in center of back of head with \"blocked out vision on the top.\"  Happens every couple days. She has a remote H/o migraines last one five years ago, however she states that these headaches feel different. " she also reports motion sickness- happends when turning head, sitting up. Does not feel like room is spinning, rather like she is rocking on a boat. previously had good sleep routine 10 months ago. Since her concussion sleeping 15 hrs then not able to sleep. Primarily with difficulty falling asleep.  She was seen by our clinic on 1/4/22. Repeat CT was ordered and done on 1/25/22 which was unremarkable for bleeding or acute change..     Followup: Return in about 1 month (around 2/26/2022).      Signed by: Venu Green M.D.

## 2022-01-27 ENCOUNTER — HOSPITAL ENCOUNTER (OUTPATIENT)
Dept: RADIOLOGY | Facility: MEDICAL CENTER | Age: 42
End: 2022-01-27
Attending: STUDENT IN AN ORGANIZED HEALTH CARE EDUCATION/TRAINING PROGRAM
Payer: COMMERCIAL

## 2022-01-27 ENCOUNTER — TELEPHONE (OUTPATIENT)
Dept: INTERNAL MEDICINE | Facility: OTHER | Age: 42
End: 2022-01-27
Payer: COMMERCIAL

## 2022-01-27 DIAGNOSIS — N17.9 AKI (ACUTE KIDNEY INJURY) (HCC): ICD-10-CM

## 2022-01-27 DIAGNOSIS — E87.29 HIGH ANION GAP METABOLIC ACIDOSIS: ICD-10-CM

## 2022-01-27 DIAGNOSIS — R10.9 ABDOMINAL PAIN, UNSPECIFIED ABDOMINAL LOCATION: ICD-10-CM

## 2022-01-27 PROCEDURE — 74019 RADEX ABDOMEN 2 VIEWS: CPT

## 2022-01-28 NOTE — TELEPHONE ENCOUNTER
I called the patient to let her know about the abdominal x ray result, which was unremarkable for acute findings including bowel obstruction. Patient's nausea and abdominal pain improved compared to yesterday. She was able to have fluid and a toast today. We are still waiting on the lab results.

## 2022-01-31 ENCOUNTER — TELEPHONE (OUTPATIENT)
Dept: INTERNAL MEDICINE | Facility: OTHER | Age: 42
End: 2022-01-31

## 2022-01-31 ENCOUNTER — HOSPITAL ENCOUNTER (OUTPATIENT)
Dept: LAB | Facility: MEDICAL CENTER | Age: 42
End: 2022-01-31
Attending: STUDENT IN AN ORGANIZED HEALTH CARE EDUCATION/TRAINING PROGRAM
Payer: COMMERCIAL

## 2022-01-31 DIAGNOSIS — R10.9 ABDOMINAL PAIN, UNSPECIFIED ABDOMINAL LOCATION: ICD-10-CM

## 2022-01-31 LAB
ALBUMIN SERPL BCP-MCNC: 5.2 G/DL (ref 3.2–4.9)
ALBUMIN/GLOB SERPL: 2 G/DL
ALP SERPL-CCNC: 90 U/L (ref 30–99)
ALT SERPL-CCNC: 32 U/L (ref 2–50)
ANION GAP SERPL CALC-SCNC: 17 MMOL/L (ref 7–16)
APPEARANCE UR: ABNORMAL
AST SERPL-CCNC: 26 U/L (ref 12–45)
BACTERIA #/AREA URNS HPF: ABNORMAL /HPF
BASOPHILS # BLD AUTO: 1.1 % (ref 0–1.8)
BASOPHILS # BLD: 0.09 K/UL (ref 0–0.12)
BILIRUB SERPL-MCNC: 0.4 MG/DL (ref 0.1–1.5)
BILIRUB UR QL STRIP.AUTO: NEGATIVE
BUN SERPL-MCNC: 13 MG/DL (ref 8–22)
CALCIUM SERPL-MCNC: 9.8 MG/DL (ref 8.5–10.5)
CHLORIDE SERPL-SCNC: 102 MMOL/L (ref 96–112)
CO2 SERPL-SCNC: 18 MMOL/L (ref 20–33)
COLOR UR: YELLOW
CREAT SERPL-MCNC: 1.31 MG/DL (ref 0.5–1.4)
EOSINOPHIL # BLD AUTO: 0.14 K/UL (ref 0–0.51)
EOSINOPHIL NFR BLD: 1.7 % (ref 0–6.9)
EPI CELLS #/AREA URNS HPF: ABNORMAL /HPF
ERYTHROCYTE [DISTWIDTH] IN BLOOD BY AUTOMATED COUNT: 43.5 FL (ref 35.9–50)
GLOBULIN SER CALC-MCNC: 2.6 G/DL (ref 1.9–3.5)
GLUCOSE SERPL-MCNC: 83 MG/DL (ref 65–99)
GLUCOSE UR STRIP.AUTO-MCNC: NEGATIVE MG/DL
HCT VFR BLD AUTO: 42.7 % (ref 37–47)
HGB BLD-MCNC: 14.1 G/DL (ref 12–16)
HYALINE CASTS #/AREA URNS LPF: ABNORMAL /LPF
IMM GRANULOCYTES # BLD AUTO: 0.06 K/UL (ref 0–0.11)
IMM GRANULOCYTES NFR BLD AUTO: 0.7 % (ref 0–0.9)
KETONES UR STRIP.AUTO-MCNC: NEGATIVE MG/DL
LEUKOCYTE ESTERASE UR QL STRIP.AUTO: NEGATIVE
LIPASE SERPL-CCNC: 27 U/L (ref 11–82)
LYMPHOCYTES # BLD AUTO: 1.66 K/UL (ref 1–4.8)
LYMPHOCYTES NFR BLD: 20.3 % (ref 22–41)
MCH RBC QN AUTO: 32 PG (ref 27–33)
MCHC RBC AUTO-ENTMCNC: 33 G/DL (ref 33.6–35)
MCV RBC AUTO: 97 FL (ref 81.4–97.8)
MICRO URNS: ABNORMAL
MONOCYTES # BLD AUTO: 0.8 K/UL (ref 0–0.85)
MONOCYTES NFR BLD AUTO: 9.8 % (ref 0–13.4)
NEUTROPHILS # BLD AUTO: 5.43 K/UL (ref 2–7.15)
NEUTROPHILS NFR BLD: 66.4 % (ref 44–72)
NITRITE UR QL STRIP.AUTO: NEGATIVE
NRBC # BLD AUTO: 0 K/UL
NRBC BLD-RTO: 0 /100 WBC
PH UR STRIP.AUTO: 6.5 [PH] (ref 5–8)
PLATELET # BLD AUTO: 370 K/UL (ref 164–446)
PMV BLD AUTO: 10.1 FL (ref 9–12.9)
POTASSIUM SERPL-SCNC: 3.9 MMOL/L (ref 3.6–5.5)
PROT SERPL-MCNC: 7.8 G/DL (ref 6–8.2)
PROT UR QL STRIP: NEGATIVE MG/DL
RBC # BLD AUTO: 4.4 M/UL (ref 4.2–5.4)
RBC # URNS HPF: ABNORMAL /HPF
RBC UR QL AUTO: NEGATIVE
SODIUM SERPL-SCNC: 137 MMOL/L (ref 135–145)
SP GR UR STRIP.AUTO: 1.01
UROBILINOGEN UR STRIP.AUTO-MCNC: 0.2 MG/DL
WBC # BLD AUTO: 8.2 K/UL (ref 4.8–10.8)
WBC #/AREA URNS HPF: ABNORMAL /HPF

## 2022-01-31 PROCEDURE — 85025 COMPLETE CBC W/AUTO DIFF WBC: CPT

## 2022-01-31 PROCEDURE — 36415 COLL VENOUS BLD VENIPUNCTURE: CPT

## 2022-01-31 PROCEDURE — 80053 COMPREHEN METABOLIC PANEL: CPT

## 2022-01-31 PROCEDURE — 81001 URINALYSIS AUTO W/SCOPE: CPT

## 2022-01-31 PROCEDURE — 83690 ASSAY OF LIPASE: CPT

## 2022-01-31 NOTE — TELEPHONE ENCOUNTER
VOICEMAIL  1. Caller Name: Kathleen Alvarado                      Call Back Number: [phone]    2. Message: patient would like a call from a provider, she stated that she is still having stomach pain. She is scared since she does not have her large intestine and colon. She feels as if everything moves around when she lays down and it is really painful/    3. Patient approves office to leave a detailed voicemail/MyChart message: yes

## 2022-02-01 ENCOUNTER — OFFICE VISIT (OUTPATIENT)
Dept: INTERNAL MEDICINE | Facility: OTHER | Age: 42
End: 2022-02-01
Payer: COMMERCIAL

## 2022-02-01 VITALS
TEMPERATURE: 98.3 F | SYSTOLIC BLOOD PRESSURE: 120 MMHG | OXYGEN SATURATION: 96 % | HEART RATE: 86 BPM | BODY MASS INDEX: 30.07 KG/M2 | HEIGHT: 62 IN | WEIGHT: 163.4 LBS | DIASTOLIC BLOOD PRESSURE: 80 MMHG

## 2022-02-01 DIAGNOSIS — Z98.890 ABDOMINAL PAIN WITH VOMITING AND HISTORY OF ABDOMINAL SURGERY: ICD-10-CM

## 2022-02-01 DIAGNOSIS — R11.10 ABDOMINAL PAIN WITH VOMITING AND HISTORY OF ABDOMINAL SURGERY: ICD-10-CM

## 2022-02-01 DIAGNOSIS — R10.9 ABDOMINAL PAIN WITH VOMITING AND HISTORY OF ABDOMINAL SURGERY: ICD-10-CM

## 2022-02-01 PROCEDURE — 99214 OFFICE O/P EST MOD 30 MIN: CPT | Mod: GC | Performed by: STUDENT IN AN ORGANIZED HEALTH CARE EDUCATION/TRAINING PROGRAM

## 2022-02-01 ASSESSMENT — FIBROSIS 4 INDEX: FIB4 SCORE: 0.48

## 2022-02-01 NOTE — TELEPHONE ENCOUNTER
Patient called at 18:15. Patient denies any hematochezia or heavy vomiting but was advised to go to ED if those occur prior to our scheduled office visit tomorrow.

## 2022-02-02 ASSESSMENT — ENCOUNTER SYMPTOMS
SHORTNESS OF BREATH: 0
ABDOMINAL PAIN: 1
FEVER: 0
INSOMNIA: 0
CHILLS: 0
HEADACHES: 0
DOUBLE VISION: 0
BLOOD IN STOOL: 0
LOSS OF CONSCIOUSNESS: 0
BRUISES/BLEEDS EASILY: 0
CONSTIPATION: 0
FALLS: 0
POLYDIPSIA: 0
PALPITATIONS: 0
NAUSEA: 0
MYALGIAS: 0
MEMORY LOSS: 0
SORE THROAT: 0
BLURRED VISION: 0
COUGH: 0
VOMITING: 1

## 2022-02-03 NOTE — PROGRESS NOTES
"Established Patient    Kathleen Alvarado is a 41 y.o. female who presents today with the following:    CC: abdominal pain    HPI: 40 yo F with hx of over 30 abdominal and pelvic surgeries and hx of bowel obstruction with colon removal, presents today for an acute visit related to abdominal pain and a sensation of her abdominal contents \"moving around inside.\" This has been going on for 7 days and in the past she has had intermittent bouts of severe abdominal pain. She denies constipation and a recent KUB x ray did not reveal obstruction. She denies any blood per rectum or fevers. She has had some modest vomiting that resolved. She reports some mild weight gain over the last few months. Given the intermittent nature of her abdominal pain and her extensive history of multiple abdominal surgeries there is concern for possible adhesions or sequale of multiple surgeries and scarring that could be the etiology of this unusual sporadic abdominal pain.     Review of Systems   Constitutional: Negative for chills and fever.   HENT: Negative for hearing loss and sore throat.    Eyes: Negative for blurred vision and double vision.   Respiratory: Negative for cough and shortness of breath.    Cardiovascular: Negative for chest pain and palpitations.   Gastrointestinal: Positive for abdominal pain and vomiting. Negative for blood in stool, constipation, melena and nausea.   Genitourinary: Negative for dysuria and hematuria.   Musculoskeletal: Negative for falls and myalgias.   Skin: Negative for itching and rash.   Neurological: Negative for loss of consciousness and headaches.   Endo/Heme/Allergies: Negative for polydipsia. Does not bruise/bleed easily.   Psychiatric/Behavioral: Negative for memory loss. The patient does not have insomnia.        Patient Active Problem List    Diagnosis Date Noted   • Abdominal pain with vomiting and history of abdominal surgery 02/01/2022   • Generalized anxiety disorder 01/25/2022   • Hepatic " lesion 01/25/2022   • Post concussion syndrome 01/05/2022   • Insomnia due to medical condition 01/05/2022   • Fatigue 10/25/2021   • Migraine 10/25/2021   • Hormone replacement therapy 10/25/2021   • History of TIA (transient ischemic attack) 10/25/2021   • Depression 06/15/2021   • Gastroparesis 05/18/2021   • Vitamin D deficiency 05/18/2021   • Abdominal pain 03/18/2018   • Narcotic dependence (HCC) 12/25/2015   • Chronic back pain 07/21/2011   • Seasonal allergies 07/21/2011   • Acne 07/21/2011   • Hypothyroidism 07/21/2011       Social History     Tobacco Use   • Smoking status: Never Smoker   • Smokeless tobacco: Never Used   • Tobacco comment: exposed to secondhand smoke in childhood   Vaping Use   • Vaping Use: Never used   Substance Use Topics   • Alcohol use: Yes     Alcohol/week: 0.6 oz     Types: 1 Shots of liquor per week     Comment: ocasionally   • Drug use: No       Current Outpatient Medications   Medication Sig Dispense Refill   • cyclobenzaprine (FLEXERIL) 10 mg Tab      • pregabalin (LYRICA) 150 MG Cap      • buPROPion (WELLBUTRIN XL) 300 MG XL tablet Take 1 Tablet by mouth every morning. (Patient taking differently: Take 450 mg by mouth every morning. Takes a 300 and a 150mg daily) 90 Tablet 1   • promethazine (PHENERGAN) 25 MG Suppos Insert 1 Suppository into the rectum every 8 hours as needed for Nausea/Vomiting for up to 15 doses. 15 Suppository 0   • DULoxetine (CYMBALTA) 30 MG Cap DR Particles TAKE 3 CAPSULES BY MOUTH EVERY  Capsule 0   • metoclopramide (REGLAN) 10 MG Tab TAKE ONE TABLET THREE TIMES DAILY PRIOR TO MEALS 270 Tablet 0   • ondansetron (ZOFRAN ODT) 4 MG TABLET DISPERSIBLE PLACE 1 TABLET UNDER THE TONGUE EVERY 4 TO 6 HRS AS NEEDED FOR NAUSEA. 10 Tablet 0   • ibuprofen (MOTRIN) 800 MG Tab Take 800 mg by mouth every 8 hours as needed. Indications: Pain     • levothyroxine (SYNTHROID) 100 MCG Tab Take 100 mcg by mouth Every morning on an empty stomach. Must have Synthroid-  "brand name- not levothyroxine       No current facility-administered medications for this visit.       /80 (BP Location: Right arm, Patient Position: Sitting, BP Cuff Size: Adult)   Pulse 86   Temp 36.8 °C (98.3 °F) (Temporal)   Ht 1.575 m (5' 2\")   Wt 74.1 kg (163 lb 6.4 oz)   LMP 12/01/2011 (Approximate)   SpO2 96%   BMI 29.89 kg/m²     Physical Exam  General: Well developed, well nourished female, in no distress.  Eyes: Conjuntiva without any obvious injection or erythema.   Cardiovascular: Heart is regular with no murmurs  Lungs: Clear to auscultation bilaterally. No wheezes, rhonci or crackles heard. Respiratory effort is normal.  Abd: mildly firm compared to previous visits, mildly tender on deep palpation diffusely  Ext: No edema  Neuro: A&Ox3, no focal deficit      Assessment and Plan    1. Abdominal pain with vomiting and history of abdominal surgery  7 day hx of intermittent severe abdominal pain in the absence of warning signs for ischemic bowel in the setting of over 30 previous abdominal/pelvic surgeries.  -Recent KUB negative  -Patient is followed closely by GI consultants  -Referred to general surgery for further evaluation given unusual presentation and extensive previous surgical history in the abdominal region.  -counseled on warning signs that would warrant emergent ER visit    [facility] patient seen by Dr. Jennings    Follow up in 4 months    Signed by: Hamzah Ferguson M.D.      "

## 2022-02-04 NOTE — TELEPHONE ENCOUNTER
I called patient and let her know about the lab results. Lipase, UA and cbc were unremarkable. Her metabolic panel was remarkable for ANGELA and high anion gap metabolic acidosis. She still has her chronic abdominal pain that happens intermittently. I instructed her to do another blood work sometime next week to make sure that her labs look improved.  She states that she has been eating and drinking well. She denies use of NSAIDS.

## 2022-02-09 ENCOUNTER — HOSPITAL ENCOUNTER (OUTPATIENT)
Dept: LAB | Facility: MEDICAL CENTER | Age: 42
End: 2022-02-09
Attending: STUDENT IN AN ORGANIZED HEALTH CARE EDUCATION/TRAINING PROGRAM
Payer: COMMERCIAL

## 2022-02-09 DIAGNOSIS — F32.A DEPRESSION, UNSPECIFIED DEPRESSION TYPE: ICD-10-CM

## 2022-02-09 DIAGNOSIS — R53.83 FATIGUE, UNSPECIFIED TYPE: ICD-10-CM

## 2022-02-09 DIAGNOSIS — E03.9 HYPOTHYROIDISM, UNSPECIFIED TYPE: Chronic | ICD-10-CM

## 2022-02-09 DIAGNOSIS — Z86.73 HISTORY OF TIA (TRANSIENT ISCHEMIC ATTACK): ICD-10-CM

## 2022-02-09 LAB
ALBUMIN SERPL BCP-MCNC: 4.9 G/DL (ref 3.2–4.9)
ALBUMIN/GLOB SERPL: 2 G/DL
ALP SERPL-CCNC: 81 U/L (ref 30–99)
ALT SERPL-CCNC: 29 U/L (ref 2–50)
ANION GAP SERPL CALC-SCNC: 13 MMOL/L (ref 7–16)
AST SERPL-CCNC: 20 U/L (ref 12–45)
BASOPHILS # BLD AUTO: 1.1 % (ref 0–1.8)
BASOPHILS # BLD: 0.07 K/UL (ref 0–0.12)
BILIRUB SERPL-MCNC: 0.2 MG/DL (ref 0.1–1.5)
BUN SERPL-MCNC: 14 MG/DL (ref 8–22)
CALCIUM SERPL-MCNC: 9.3 MG/DL (ref 8.5–10.5)
CHLORIDE SERPL-SCNC: 102 MMOL/L (ref 96–112)
CO2 SERPL-SCNC: 23 MMOL/L (ref 20–33)
CREAT SERPL-MCNC: 1.58 MG/DL (ref 0.5–1.4)
EOSINOPHIL # BLD AUTO: 0.21 K/UL (ref 0–0.51)
EOSINOPHIL NFR BLD: 3.4 % (ref 0–6.9)
ERYTHROCYTE [DISTWIDTH] IN BLOOD BY AUTOMATED COUNT: 41.9 FL (ref 35.9–50)
GLOBULIN SER CALC-MCNC: 2.5 G/DL (ref 1.9–3.5)
GLUCOSE SERPL-MCNC: 89 MG/DL (ref 65–99)
HCT VFR BLD AUTO: 38.2 % (ref 37–47)
HGB BLD-MCNC: 12.8 G/DL (ref 12–16)
IMM GRANULOCYTES # BLD AUTO: 0.04 K/UL (ref 0–0.11)
IMM GRANULOCYTES NFR BLD AUTO: 0.6 % (ref 0–0.9)
LYMPHOCYTES # BLD AUTO: 1.42 K/UL (ref 1–4.8)
LYMPHOCYTES NFR BLD: 22.9 % (ref 22–41)
MCH RBC QN AUTO: 31.7 PG (ref 27–33)
MCHC RBC AUTO-ENTMCNC: 33.5 G/DL (ref 33.6–35)
MCV RBC AUTO: 94.6 FL (ref 81.4–97.8)
MONOCYTES # BLD AUTO: 0.59 K/UL (ref 0–0.85)
MONOCYTES NFR BLD AUTO: 9.5 % (ref 0–13.4)
NEUTROPHILS # BLD AUTO: 3.86 K/UL (ref 2–7.15)
NEUTROPHILS NFR BLD: 62.5 % (ref 44–72)
NRBC # BLD AUTO: 0 K/UL
NRBC BLD-RTO: 0 /100 WBC
PLATELET # BLD AUTO: 284 K/UL (ref 164–446)
PMV BLD AUTO: 10.4 FL (ref 9–12.9)
POTASSIUM SERPL-SCNC: 3.8 MMOL/L (ref 3.6–5.5)
PROT SERPL-MCNC: 7.4 G/DL (ref 6–8.2)
RBC # BLD AUTO: 4.04 M/UL (ref 4.2–5.4)
SODIUM SERPL-SCNC: 138 MMOL/L (ref 135–145)
T4 FREE SERPL-MCNC: 1.38 NG/DL (ref 0.93–1.7)
TSH SERPL DL<=0.005 MIU/L-ACNC: 0.37 UIU/ML (ref 0.38–5.33)
WBC # BLD AUTO: 6.2 K/UL (ref 4.8–10.8)

## 2022-02-09 PROCEDURE — 80053 COMPREHEN METABOLIC PANEL: CPT

## 2022-02-09 PROCEDURE — 85303 CLOT INHIBIT PROT C ACTIVITY: CPT

## 2022-02-09 PROCEDURE — 86147 CARDIOLIPIN ANTIBODY EA IG: CPT

## 2022-02-09 PROCEDURE — 85306 CLOT INHIBIT PROT S FREE: CPT

## 2022-02-09 PROCEDURE — 84443 ASSAY THYROID STIM HORMONE: CPT

## 2022-02-09 PROCEDURE — 36415 COLL VENOUS BLD VENIPUNCTURE: CPT

## 2022-02-09 PROCEDURE — 84439 ASSAY OF FREE THYROXINE: CPT

## 2022-02-09 PROCEDURE — 85025 COMPLETE CBC W/AUTO DIFF WBC: CPT

## 2022-02-10 ENCOUNTER — TELEPHONE (OUTPATIENT)
Dept: INTERNAL MEDICINE | Facility: OTHER | Age: 42
End: 2022-02-10

## 2022-02-10 NOTE — TELEPHONE ENCOUNTER
VOICEMAIL  1. Caller Name: Janina ()                      Call Back Number: [phone]    2. Message: Janina at 's office called and lm  stating she needed to speak to somebody regarding Kathleen.      02/10/22 9:08 AM I returned the call and Janina stated per , he does not think Kathleen is a surgical candidate , her most recent imaging was normal ,he said maybe GI or pain management would be better for the patient.    please review and advise    3. Patient approves office to leave a detailed voicemail/MyChart message: NO

## 2022-02-11 LAB
CARDIOLIPIN IGG SER IA-ACNC: 30 GPL
CARDIOLIPIN IGM SER IA-ACNC: 21 MPL

## 2022-02-12 NOTE — TELEPHONE ENCOUNTER
Creatinine keeps going up. Although other components of metabolic panel look unremarkable. No urine protein. No Nsaid use. Eating and drinking well. Placed the order for nephrology referral due to concern for CKD at age 41?. I hope it is just ATN that has just reached the peak creatinine.  I called patient and let her know. Since BUN is only 14 up from 13, I will not send her to emergency department for fluid or dialysis. Just continuing to monitor creatinine and having nephrology on board as well.

## 2022-02-13 LAB
PROT C ACT/NOR PPP: 198 % (ref 83–168)
PROT S ACT/NOR PPP: 90 % (ref 57–131)

## 2022-02-14 RX ORDER — METOCLOPRAMIDE 10 MG/1
TABLET ORAL
Qty: 270 TABLET | Refills: 1 | Status: SHIPPED | OUTPATIENT
Start: 2022-02-14 | End: 2022-07-18

## 2022-02-14 NOTE — TELEPHONE ENCOUNTER
Last seen:02/01/2022 by Dr. Ferguson  Next appt:05/20/2022 with Dr. Ferguson  Was the patient seen in the last year in this department? Yes   Does patient have an active prescription for medications requested? No   Received Request Via: Pharmacy

## 2022-02-14 NOTE — TELEPHONE ENCOUNTER
Hamzah Ferguson M.D.  You 2 days ago     GV    Thank you. I will address this with patient at her next visit.    Message text

## 2022-03-10 ENCOUNTER — OFFICE VISIT (OUTPATIENT)
Dept: NEPHROLOGY | Facility: MEDICAL CENTER | Age: 42
End: 2022-03-10
Payer: COMMERCIAL

## 2022-03-10 VITALS
HEART RATE: 68 BPM | DIASTOLIC BLOOD PRESSURE: 78 MMHG | SYSTOLIC BLOOD PRESSURE: 122 MMHG | TEMPERATURE: 97.2 F | OXYGEN SATURATION: 98 % | HEIGHT: 62 IN | WEIGHT: 145 LBS | BODY MASS INDEX: 26.68 KG/M2

## 2022-03-10 DIAGNOSIS — E55.9 VITAMIN D DEFICIENCY: ICD-10-CM

## 2022-03-10 DIAGNOSIS — I10 HYPERTENSION, UNSPECIFIED TYPE: ICD-10-CM

## 2022-03-10 DIAGNOSIS — N17.9 AKI (ACUTE KIDNEY INJURY) (HCC): ICD-10-CM

## 2022-03-10 DIAGNOSIS — D64.9 ANEMIA, UNSPECIFIED TYPE: ICD-10-CM

## 2022-03-10 DIAGNOSIS — N18.2 CKD (CHRONIC KIDNEY DISEASE), STAGE II: ICD-10-CM

## 2022-03-10 PROCEDURE — 99204 OFFICE O/P NEW MOD 45 MIN: CPT | Performed by: INTERNAL MEDICINE

## 2022-03-10 RX ORDER — DULOXETIN HYDROCHLORIDE 30 MG/1
90 CAPSULE, DELAYED RELEASE ORAL DAILY
Qty: 270 CAPSULE | Refills: 3 | Status: SHIPPED | OUTPATIENT
Start: 2022-03-10 | End: 2022-04-27 | Stop reason: SDUPTHER

## 2022-03-10 ASSESSMENT — ENCOUNTER SYMPTOMS
TREMORS: 0
HEMOPTYSIS: 0
TINGLING: 0
FEVER: 0
SPEECH CHANGE: 0
ABDOMINAL PAIN: 0
WEIGHT LOSS: 0
WHEEZING: 0
NAUSEA: 0
NECK PAIN: 0
CHILLS: 0
SINUS PAIN: 0
BACK PAIN: 1
HEADACHES: 0
COUGH: 0
VOMITING: 0
DIARRHEA: 1
SENSORY CHANGE: 0
FOCAL WEAKNESS: 0
SHORTNESS OF BREATH: 0
FLANK PAIN: 0
DIZZINESS: 0
PALPITATIONS: 0
EYES NEGATIVE: 1
MYALGIAS: 0
ORTHOPNEA: 0

## 2022-03-10 ASSESSMENT — FIBROSIS 4 INDEX: FIB4 SCORE: 0.51

## 2022-03-10 NOTE — PATIENT INSTRUCTIONS
Avoid NSAID's  Stop Ibuprofen, flexeril, Lyrica  Keep well hydrated  Repeat renal panel and urine studies next week

## 2022-03-10 NOTE — TELEPHONE ENCOUNTER
Duloxetine Refill    Last seen: 2/1/22 by Dr. Ferguson  Next appt: 5/20/22 with Dr. Ferguson    Was the patient seen in the last year in this department? Yes   Does patient have an active prescription for medications requested? No   Received Request Via: Pharmacy

## 2022-03-11 NOTE — PROGRESS NOTES
Subjective     Kathleen Alvarado is a 41 y.o. female who presents with New Patient (ANGELA) and Chronic Kidney Disease            HPI  Kathleen is coming today for initial evaluation of ANGELA/CKD II  Hx/of bowel resection in 2018  Chronic back pain -on NSAID's, Lyrica, epidural injections  C/o weight gain over past year  No dysuria/hematuria/flank pain  HTN: BP well controlled  Pregnancy complicated with proteinuria -resolved after delivery  CKD -baseline creat level at 0.9's -worse to 1.3-1.58!  UA negative for proteinuria, hematuria    Review of Systems   Constitutional: Negative for chills, fever, malaise/fatigue and weight loss.   HENT: Negative for congestion, hearing loss and sinus pain.    Eyes: Negative.    Respiratory: Negative for cough, hemoptysis, shortness of breath and wheezing.    Cardiovascular: Positive for leg swelling. Negative for chest pain, palpitations and orthopnea.   Gastrointestinal: Positive for diarrhea. Negative for abdominal pain, nausea and vomiting.   Genitourinary: Negative for dysuria, flank pain, frequency, hematuria and urgency.   Musculoskeletal: Positive for back pain. Negative for myalgias and neck pain.   Skin: Negative.    Neurological: Negative for dizziness, tingling, tremors, sensory change, speech change, focal weakness and headaches.   All other systems reviewed and are negative.         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 7/22/19-resolved after colon surgery.   • Cold 05/03/2019    Sinus infection, beginning of April, better now, denies SOB, productive cough   • High cholesterol 08/2018    History of, related colon surgery-no meds   • History of anemia    • Hx of endometriosis    • Migraine    • Pain 05/2019    stomach, lower back   • Pneumonia 12/2018   • Polycystic ovaries     abn menses, vag bleeding x 2 months- has now had  "hysterectomy   • PONV (postoperative nausea and vomiting)    • Psychiatric problem     depression, anxiety   • Seizure ([facility]) 06/2017    d/t \"thyroid level being off\"   • Stroke ([facility]) 08/2018    Slight right sided weakness.  (Stroke related to colon surgery)-7/22/19-RESOLVED.    • Thyroid condition     hypothyroid       Family History   Problem Relation Age of Onset   • Arthritis Father         gout   • Heart Disease Paternal Grandfather    • Hypertension Other    • Stroke Other        Social History     Socioeconomic History   • Marital status: Single   Tobacco Use   • Smoking status: Never Smoker   • Smokeless tobacco: Never Used   • Tobacco comment: exposed to secondhand smoke in childhood   Vaping Use   • Vaping Use: Never used   Substance and Sexual Activity   • Alcohol use: Yes     Alcohol/week: 0.6 oz     Types: 1 Shots of liquor per week     Comment: ocasionally   • Drug use: No   • Sexual activity: Yes     Partners: Male         Objective     /78 (BP Location: Right arm, Patient Position: Sitting)   Pulse 68   Temp 36.2 °C (97.2 °F) (Temporal)   Ht 1.575 m (5' 2\")   Wt 65.8 kg (145 lb)   LMP 12/01/2011 (Approximate)   SpO2 98%   BMI 26.52 kg/m²      Physical Exam  Vitals reviewed.   Constitutional:       General: She is not in acute distress.     Appearance: Normal appearance. She is well-developed. She is not diaphoretic.   HENT:      Head: Normocephalic and atraumatic.      Nose: Nose normal.      Mouth/Throat:      Mouth: Mucous membranes are moist.      Pharynx: Oropharynx is clear.   Eyes:      Extraocular Movements: Extraocular movements intact.      Conjunctiva/sclera: Conjunctivae normal.      Pupils: Pupils are equal, round, and reactive to light.   Cardiovascular:      Rate and Rhythm: Normal rate and regular rhythm.      Pulses: Normal pulses.      Heart sounds: Normal heart sounds.     No friction rub. No gallop.   Pulmonary:      Effort: Pulmonary effort is normal. No respiratory " distress.      Breath sounds: Normal breath sounds. No wheezing, rhonchi or rales.   Abdominal:      General: Bowel sounds are normal. There is no distension.      Palpations: Abdomen is soft. There is no mass.      Tenderness: There is no abdominal tenderness. There is no right CVA tenderness, left CVA tenderness or guarding.   Musculoskeletal:      Cervical back: Normal range of motion and neck supple.      Right lower leg: No edema.      Left lower leg: No edema.   Skin:     General: Skin is warm.      Coloration: Skin is not pale.      Findings: No erythema or rash.   Neurological:      General: No focal deficit present.      Mental Status: She is alert and oriented to person, place, and time.      Cranial Nerves: No cranial nerve deficit.      Coordination: Coordination normal.   Psychiatric:         Mood and Affect: Mood normal.         Behavior: Behavior normal.         Thought Content: Thought content normal.         Judgment: Judgment normal.             Laboratory results, CT abd reviewed: d/w Pt  Lab Results   Component Value Date/Time    CREATININE 1.58 (H) 02/09/2022 12:20 PM    CREATININE 1.1 06/16/2008 03:10 AM    POTASSIUM 3.8 02/09/2022 12:20 PM              Assessment & Plan        1. ANGELA (acute kidney injury) (HCC)       Of unclear etiology -to monitor       Consider kidney biopsy if worse  - Basic Metabolic Panel; Future  - URINALYSIS; Future  - URINE PROTEIN; Future  - URINE CREATININE RANDOM; Future  - COMPLEMENT C3; Future  - COMPLEMENT C4; Future    2. Hypertension, unspecified type      BP well controlled -to monitor  - Basic Metabolic Panel; Future  - URINALYSIS; Future  - URINE PROTEIN; Future  - URINE CREATININE RANDOM; Future    3. CKD (chronic kidney disease), stage II    - Basic Metabolic Panel; Future  - URINALYSIS; Future  - URINE PROTEIN; Future  - URINE CREATININE RANDOM; Future    4. Vitamin D deficiency        - VITAMIN D 25-HYDROXY    5. Anemia, unspecified type      Hb  stable     Recs:      Avoid NSAID's  Stop Ibuprofen, flexeril, Lyrica  Keep well hydrated  Repeat renal panel and urine studies next week and call clinic to discuss results  F/u in 2 months  Thank you for the consult

## 2022-03-18 ENCOUNTER — HOSPITAL ENCOUNTER (OUTPATIENT)
Dept: LAB | Facility: MEDICAL CENTER | Age: 42
End: 2022-03-18
Attending: INTERNAL MEDICINE
Payer: COMMERCIAL

## 2022-03-18 DIAGNOSIS — I10 HYPERTENSION, UNSPECIFIED TYPE: ICD-10-CM

## 2022-03-18 DIAGNOSIS — N18.2 CKD (CHRONIC KIDNEY DISEASE), STAGE II: ICD-10-CM

## 2022-03-18 DIAGNOSIS — N17.9 AKI (ACUTE KIDNEY INJURY) (HCC): ICD-10-CM

## 2022-03-18 LAB
ANION GAP SERPL CALC-SCNC: 15 MMOL/L (ref 7–16)
APPEARANCE UR: CLEAR
BILIRUB UR QL STRIP.AUTO: NEGATIVE
BUN SERPL-MCNC: 7 MG/DL (ref 8–22)
CALCIUM SERPL-MCNC: 9.6 MG/DL (ref 8.5–10.5)
CHLORIDE SERPL-SCNC: 104 MMOL/L (ref 96–112)
CO2 SERPL-SCNC: 21 MMOL/L (ref 20–33)
COLOR UR: YELLOW
CREAT SERPL-MCNC: 1.04 MG/DL (ref 0.5–1.4)
FASTING STATUS PATIENT QL REPORTED: NORMAL
GFR SERPLBLD CREATININE-BSD FMLA CKD-EPI: 70 ML/MIN/1.73 M 2
GLUCOSE SERPL-MCNC: 78 MG/DL (ref 65–99)
GLUCOSE UR STRIP.AUTO-MCNC: NEGATIVE MG/DL
KETONES UR STRIP.AUTO-MCNC: NEGATIVE MG/DL
LEUKOCYTE ESTERASE UR QL STRIP.AUTO: NEGATIVE
MICRO URNS: NORMAL
NITRITE UR QL STRIP.AUTO: NEGATIVE
PH UR STRIP.AUTO: 6.5 [PH] (ref 5–8)
POTASSIUM SERPL-SCNC: 4.9 MMOL/L (ref 3.6–5.5)
PROT UR QL STRIP: NEGATIVE MG/DL
RBC UR QL AUTO: NEGATIVE
SODIUM SERPL-SCNC: 140 MMOL/L (ref 135–145)
SP GR UR STRIP.AUTO: 1
UROBILINOGEN UR STRIP.AUTO-MCNC: 0.2 MG/DL

## 2022-03-18 PROCEDURE — 81003 URINALYSIS AUTO W/O SCOPE: CPT

## 2022-03-18 PROCEDURE — 84156 ASSAY OF PROTEIN URINE: CPT

## 2022-03-18 PROCEDURE — 82570 ASSAY OF URINE CREATININE: CPT

## 2022-03-18 PROCEDURE — 86160 COMPLEMENT ANTIGEN: CPT

## 2022-03-18 PROCEDURE — 80048 BASIC METABOLIC PNL TOTAL CA: CPT

## 2022-03-18 PROCEDURE — 36415 COLL VENOUS BLD VENIPUNCTURE: CPT

## 2022-03-18 PROCEDURE — 82306 VITAMIN D 25 HYDROXY: CPT

## 2022-03-19 LAB
25(OH)D3 SERPL-MCNC: 15 NG/ML (ref 30–100)
C3 SERPL-MCNC: 114.8 MG/DL (ref 87–200)
C4 SERPL-MCNC: 16.6 MG/DL (ref 19–52)
CREAT UR-MCNC: 25.52 MG/DL
PROT UR-MCNC: <4 MG/DL (ref 0–15)

## 2022-03-22 ENCOUNTER — TELEPHONE (OUTPATIENT)
Dept: NEPHROLOGY | Facility: MEDICAL CENTER | Age: 42
End: 2022-03-22

## 2022-03-22 NOTE — TELEPHONE ENCOUNTER
Lj Mortensen,  Patient called to discuss completed lab work with you. She would like a call back when you're available.

## 2022-04-27 DIAGNOSIS — N18.2 CKD (CHRONIC KIDNEY DISEASE), STAGE II: ICD-10-CM

## 2022-04-27 DIAGNOSIS — N17.9 AKI (ACUTE KIDNEY INJURY) (HCC): ICD-10-CM

## 2022-04-27 NOTE — TELEPHONE ENCOUNTER
Last seen: 02.01.2022 by Dr. sears  Next appt: 05.20.2022 with Dr. sears    Was the patient seen in the last year in this department? Yes   Does patient have an active prescription for medications requested? No   Received Request Via: Pharmacy

## 2022-04-28 RX ORDER — BUPROPION HYDROCHLORIDE 300 MG/1
300 TABLET ORAL EVERY MORNING
Qty: 90 TABLET | Refills: 1 | Status: SHIPPED | OUTPATIENT
Start: 2022-04-28 | End: 2023-01-13

## 2022-04-28 RX ORDER — DULOXETIN HYDROCHLORIDE 30 MG/1
90 CAPSULE, DELAYED RELEASE ORAL DAILY
Qty: 270 CAPSULE | Refills: 1 | Status: SHIPPED | OUTPATIENT
Start: 2022-04-28 | End: 2022-09-13 | Stop reason: SDUPTHER

## 2022-05-04 ENCOUNTER — APPOINTMENT (OUTPATIENT)
Dept: RADIOLOGY | Facility: MEDICAL CENTER | Age: 42
End: 2022-05-04
Attending: EMERGENCY MEDICINE
Payer: COMMERCIAL

## 2022-05-04 ENCOUNTER — OFFICE VISIT (OUTPATIENT)
Dept: INTERNAL MEDICINE | Facility: OTHER | Age: 42
End: 2022-05-04

## 2022-05-04 ENCOUNTER — HOSPITAL ENCOUNTER (EMERGENCY)
Facility: MEDICAL CENTER | Age: 42
End: 2022-05-04
Attending: EMERGENCY MEDICINE
Payer: COMMERCIAL

## 2022-05-04 VITALS
HEIGHT: 63 IN | DIASTOLIC BLOOD PRESSURE: 75 MMHG | OXYGEN SATURATION: 96 % | WEIGHT: 158 LBS | TEMPERATURE: 97.7 F | HEART RATE: 94 BPM | SYSTOLIC BLOOD PRESSURE: 102 MMHG | BODY MASS INDEX: 28 KG/M2 | RESPIRATION RATE: 18 BRPM

## 2022-05-04 VITALS
OXYGEN SATURATION: 98 % | BODY MASS INDEX: 31.43 KG/M2 | WEIGHT: 170.8 LBS | HEART RATE: 94 BPM | TEMPERATURE: 98.7 F | HEIGHT: 62 IN | SYSTOLIC BLOOD PRESSURE: 132 MMHG | DIASTOLIC BLOOD PRESSURE: 86 MMHG

## 2022-05-04 DIAGNOSIS — K92.1 MELENA: ICD-10-CM

## 2022-05-04 DIAGNOSIS — K62.5 RECTAL BLEEDING: ICD-10-CM

## 2022-05-04 DIAGNOSIS — R10.84 GENERALIZED ABDOMINAL PAIN: ICD-10-CM

## 2022-05-04 DIAGNOSIS — R55 POSTURAL DIZZINESS WITH PRESYNCOPE: ICD-10-CM

## 2022-05-04 DIAGNOSIS — R42 POSTURAL DIZZINESS WITH PRESYNCOPE: ICD-10-CM

## 2022-05-04 LAB
ABO GROUP BLD: NORMAL
ALBUMIN SERPL BCP-MCNC: 5 G/DL (ref 3.2–4.9)
ALBUMIN/GLOB SERPL: 1.7 G/DL
ALP SERPL-CCNC: 83 U/L (ref 30–99)
ALT SERPL-CCNC: 28 U/L (ref 2–50)
ANION GAP SERPL CALC-SCNC: 17 MMOL/L (ref 7–16)
APTT PPP: 36.1 SEC (ref 24.7–36)
AST SERPL-CCNC: 22 U/L (ref 12–45)
BASOPHILS # BLD AUTO: 1.2 % (ref 0–1.8)
BASOPHILS # BLD: 0.09 K/UL (ref 0–0.12)
BILIRUB SERPL-MCNC: 0.3 MG/DL (ref 0.1–1.5)
BLD GP AB SCN SERPL QL: NORMAL
BUN SERPL-MCNC: 9 MG/DL (ref 8–22)
CALCIUM SERPL-MCNC: 9.8 MG/DL (ref 8.4–10.2)
CHLORIDE SERPL-SCNC: 99 MMOL/L (ref 96–112)
CO2 SERPL-SCNC: 19 MMOL/L (ref 20–33)
CREAT SERPL-MCNC: 0.89 MG/DL (ref 0.5–1.4)
EKG IMPRESSION: NORMAL
EKG IMPRESSION: NORMAL
EOSINOPHIL # BLD AUTO: 0.21 K/UL (ref 0–0.51)
EOSINOPHIL NFR BLD: 2.7 % (ref 0–6.9)
ERYTHROCYTE [DISTWIDTH] IN BLOOD BY AUTOMATED COUNT: 40.1 FL (ref 35.9–50)
GFR SERPLBLD CREATININE-BSD FMLA CKD-EPI: 84 ML/MIN/1.73 M 2
GLOBULIN SER CALC-MCNC: 2.9 G/DL (ref 1.9–3.5)
GLUCOSE SERPL-MCNC: 98 MG/DL (ref 65–99)
HCT VFR BLD AUTO: 43.1 % (ref 37–47)
HGB BLD-MCNC: 14.8 G/DL (ref 12–16)
IMM GRANULOCYTES # BLD AUTO: 0.02 K/UL (ref 0–0.11)
IMM GRANULOCYTES NFR BLD AUTO: 0.3 % (ref 0–0.9)
INR PPP: 0.89 (ref 0.87–1.13)
LIPASE SERPL-CCNC: 47 U/L (ref 7–58)
LYMPHOCYTES # BLD AUTO: 1.95 K/UL (ref 1–4.8)
LYMPHOCYTES NFR BLD: 25.3 % (ref 22–41)
MCH RBC QN AUTO: 31.2 PG (ref 27–33)
MCHC RBC AUTO-ENTMCNC: 34.3 G/DL (ref 33.6–35)
MCV RBC AUTO: 90.9 FL (ref 81.4–97.8)
MONOCYTES # BLD AUTO: 0.52 K/UL (ref 0–0.85)
MONOCYTES NFR BLD AUTO: 6.7 % (ref 0–13.4)
NEUTROPHILS # BLD AUTO: 4.92 K/UL (ref 2–7.15)
NEUTROPHILS NFR BLD: 63.8 % (ref 44–72)
NRBC # BLD AUTO: 0 K/UL
NRBC BLD-RTO: 0 /100 WBC
PLATELET # BLD AUTO: 287 K/UL (ref 164–446)
PMV BLD AUTO: 9.6 FL (ref 9–12.9)
POTASSIUM SERPL-SCNC: 3.9 MMOL/L (ref 3.6–5.5)
PROT SERPL-MCNC: 7.9 G/DL (ref 6–8.2)
PROTHROMBIN TIME: 11.3 SEC (ref 12–14.6)
RBC # BLD AUTO: 4.74 M/UL (ref 4.2–5.4)
RH BLD: NORMAL
SODIUM SERPL-SCNC: 135 MMOL/L (ref 135–145)
TROPONIN T SERPL-MCNC: <6 NG/L (ref 6–19)
WBC # BLD AUTO: 7.7 K/UL (ref 4.8–10.8)

## 2022-05-04 PROCEDURE — 85610 PROTHROMBIN TIME: CPT

## 2022-05-04 PROCEDURE — 86901 BLOOD TYPING SEROLOGIC RH(D): CPT

## 2022-05-04 PROCEDURE — 86850 RBC ANTIBODY SCREEN: CPT

## 2022-05-04 PROCEDURE — 99215 OFFICE O/P EST HI 40 MIN: CPT | Mod: GC | Performed by: STUDENT IN AN ORGANIZED HEALTH CARE EDUCATION/TRAINING PROGRAM

## 2022-05-04 PROCEDURE — 36415 COLL VENOUS BLD VENIPUNCTURE: CPT

## 2022-05-04 PROCEDURE — 86900 BLOOD TYPING SEROLOGIC ABO: CPT

## 2022-05-04 PROCEDURE — 85025 COMPLETE CBC W/AUTO DIFF WBC: CPT

## 2022-05-04 PROCEDURE — 93005 ELECTROCARDIOGRAM TRACING: CPT

## 2022-05-04 PROCEDURE — 83690 ASSAY OF LIPASE: CPT

## 2022-05-04 PROCEDURE — 700117 HCHG RX CONTRAST REV CODE 255: Performed by: EMERGENCY MEDICINE

## 2022-05-04 PROCEDURE — 71045 X-RAY EXAM CHEST 1 VIEW: CPT

## 2022-05-04 PROCEDURE — 84484 ASSAY OF TROPONIN QUANT: CPT

## 2022-05-04 PROCEDURE — 74177 CT ABD & PELVIS W/CONTRAST: CPT

## 2022-05-04 PROCEDURE — 99284 EMERGENCY DEPT VISIT MOD MDM: CPT

## 2022-05-04 PROCEDURE — 85730 THROMBOPLASTIN TIME PARTIAL: CPT

## 2022-05-04 PROCEDURE — 93005 ELECTROCARDIOGRAM TRACING: CPT | Performed by: EMERGENCY MEDICINE

## 2022-05-04 PROCEDURE — 80053 COMPREHEN METABOLIC PANEL: CPT

## 2022-05-04 RX ORDER — DULOXETIN HYDROCHLORIDE 60 MG/1
CAPSULE, DELAYED RELEASE ORAL
COMMUNITY
Start: 2022-04-30 | End: 2022-08-01

## 2022-05-04 RX ORDER — DAPSONE 75 MG/G
GEL TOPICAL
COMMUNITY
Start: 2022-04-14 | End: 2023-03-14

## 2022-05-04 RX ADMIN — IOHEXOL 100 ML: 350 INJECTION, SOLUTION INTRAVENOUS at 20:16

## 2022-05-04 ASSESSMENT — FIBROSIS 4 INDEX
FIB4 SCORE: 0.52
FIB4 SCORE: 0.52

## 2022-05-04 NOTE — PROGRESS NOTES
"    Established Patient    Patient Care Team:  Hamzah Ferguson M.D. as PCP - General (Internal Medicine)    Kathleen Alvarado is a 42 y.o. female who presents today with the following Chief Complaint(s): Follow up for Diagnoses of Melena, Generalized abdominal pain, and Postural dizziness with presyncope were pertinent to this visit.    HPI:    Ms. Alvarado is a 42-year-old female with history of over 30 abdominal and pelvic surgeries, history of bowel obstruction with colon removal, hypothyroidism, ?on hormone replacement therapy, documented history of TIA who presents for routine follow-up.    She was last seen 2/1/2022 her office for abdominal pain x7 days without significant warning signs and recent normal imaging.  She was referred to general surgery.    She report for past 3 weeks she has had epigastric pain and for last 3 days, \" I have been having dark sticky stools\". There is associated foul smell. She has nauseas, and feels distended. She reports last night she had lightheadedness and felt like she was going to pass out. This feeling AM she had persistent of presyncope event. She also reports sweating. She denies fever, chills.   - she denies change in diet  - black tary stool  - she is not on iron supplements  - she report she does not on motrin  - last GI vist was 10/2021, her next appointment is schedule out in June/2022  - She report her normal bowel movement is light brown and ususually have trace undigested food.     She followed with nephrologist Dr. Velazquez for ANGELA with plans to follow-up in 2 months.  Laboratory studies: UA was fine, urine protein less than 4, BMP creatinine returned to baseline, C3 within normal limits, C4 mildly lower than reference, vitamin D 15, GFR estimated at 70.      ROS:     Denies any new chest pain or shortness of breath.  No changes to urinary or bowel function.   See HPI.    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 7/22/19-resolved after colon surgery.   • Cold 05/03/2019    Sinus infection, beginning of April, better now, denies SOB, productive cough   • High cholesterol 08/2018    History of, related colon surgery-no meds   • History of anemia    • Hx of endometriosis    • Migraine    • Pain 05/2019    stomach, lower back   • Pneumonia 12/2018   • Polycystic ovaries     abn menses, vag bleeding x 2 months- has now had hysterectomy   • PONV (postoperative nausea and vomiting)    • Psychiatric problem     depression, anxiety   • Seizure (HCC) 06/2017    d/t \"thyroid level being off\"   • Stroke ([facility]) 08/2018    Slight right sided weakness.  (Stroke related to colon surgery)-7/22/19-RESOLVED.    • Thyroid condition     hypothyroid     Social History     Tobacco Use   • Smoking status: Never Smoker   • Smokeless tobacco: Never Used   • Tobacco comment: exposed to secondhand smoke in childhood   Vaping Use   • Vaping Use: Never used   Substance Use Topics   • Alcohol use: Yes     Alcohol/week: 0.6 oz     Types: 1 Shots of liquor per week     Comment: ocasionally   • Drug use: No     Current Outpatient Medications   Medication Sig Dispense Refill   • Dapsone 7.5 % Gel APPLY 1 PUMP TO SKIN TWICE A DAY APPLY TO FACE     • DULoxetine (CYMBALTA) 60 MG Cap DR Particles delayed-release capsule      • buPROPion (WELLBUTRIN XL) 300 MG XL tablet Take 1 Tablet by mouth every morning. Takes a 300 and a 150mg daily 90 Tablet 1   • DULoxetine (CYMBALTA) 30 MG Cap DR Particles Take 3 Capsules by mouth every day. 270 Capsule 1   • levothyroxine (SYNTHROID) 100 MCG Tab TAKE 1 TABLET BY MOUTH EVERY DAY 90 Tablet 1   • metoclopramide (REGLAN) 10 MG Tab TAKE ONE TABLET THREE TIMES DAILY PRIOR TO MEALS 270 Tablet 1   • cyclobenzaprine (FLEXERIL) 10 mg Tab      • pregabalin (LYRICA) 150 MG Cap      • promethazine (PHENERGAN) 25 MG Suppos Insert 1 Suppository into the " "rectum every 8 hours as needed for Nausea/Vomiting for up to 15 doses. 15 Suppository 0   • ondansetron (ZOFRAN ODT) 4 MG TABLET DISPERSIBLE PLACE 1 TABLET UNDER THE TONGUE EVERY 4 TO 6 HRS AS NEEDED FOR NAUSEA. 10 Tablet 0     No current facility-administered medications for this visit.     Physical Exam:  /86 (BP Location: Right arm, Patient Position: Sitting, BP Cuff Size: Adult)   Pulse 94   Temp 37.1 °C (98.7 °F) (Temporal)   Ht 1.575 m (5' 2\")   Wt 77.5 kg (170 lb 12.8 oz)   LMP 12/01/2011 (Approximate)   SpO2 98%   BMI 31.24 kg/m²   General: Well developed, well nourished female, in no acute distress.   Eyes: Conjuntiva without any obvious injection or erythema.   Cardiovascular: Heart is regular with no murmur, no Le swelling  Lungs: Clear to auscultation bilaterally. No wheezes, rhonci or crackles heard. Respiratory effort is normal.  Abd: Soft, mild tenderness to palpation in epigastric region without guarding.   Ext: No edema  Skin: damp    Assessment and Plan:   1. Melena  - 42-year-old female with history of increasing colonic inertia issues this with history of multiple abdominal surgeries and most recently underwent subtotal colectomy in 2018 with Dr. GIBSON  -She presented with history that is consistent with upper GI bleed, and describes worsening melena for past 3 days and with new presyncope symptoms   -Her vital signs show tachycardia in the 90s.  - She is not on any NSAIDs, steroids, iron tablets  Plan  - I recommend patient go to the ER for stat labs and fluid resuscitation.  -She will likely require urgent endoscopy, IV PPI, etc  -Signout provided to [facility]/ER    2. Generalized abdominal pain  Epigastric pain x 3 week  No guarding on exam    3. Postural dizziness with presyncope  Heart regular on exam.   Had presyncope symptoms with change in position sitting to standing, laying to sitting.     Return in about 1 week (around 5/11/2022).  There are no Patient " Instructions on file for this visit.

## 2022-05-05 NOTE — ED TRIAGE NOTES
Chief Complaint   Patient presents with   • Rectal Bleeding     Tarry stools noticed this am. Saw PCP he advised she come to the ED. 32 Abd surgeries, colon is removed.   PIV started and COD and all blood to the lab.  Pt placed in a wheelchair for feeling unsteady.

## 2022-05-05 NOTE — ED PROVIDER NOTES
ER Provider Note     Scribed for Nirav Gray M.D. by Danita Knight. 5/4/2022, 7:28 PM.    Primary Care Provider: Hamzah Ferguson M.D.  Means of Arrival: Walk in    History obtained from: Patient  History limited by: None     CHIEF COMPLAINT  Chief Complaint   Patient presents with    Rectal Bleeding     Tarry stools noticed this am. Saw PCP he advised she come to the ED. 32 Abd surgeries, colon is removed.       HPI  Kathleen Alvarado is a 42 y.o. female who presents to the Emergency Department for evaluation of hematochezia onset few weeks. The patient reports that her stool has been normal during the day, however it will be black at night. Over the last few days, the patient reports feelings lethargic and sweaty. Last night, the patient used the restroom and had black tarry stool. She woke up this morning with sweats, dizziness, and fatigue. She called her physician who prompted her to come to the ED. She admits to associated symptoms of pain and swelling in central abdomen and nausea but denies vomiting. No alleviating factors were reported. She reports that the abdominal pain turned into a burning pain with hardening this morning. The patient has a history of GI bleeds. She used to bleed from her large intestine before she had her colon surgically removed. The physicians think that she may have had problems with her colon because of the IVF. Her twins are 11 years old.       REVIEW OF SYSTEMS  See HPI for further details. All other systems are negative.   Pertinent positives include hematochezia, abdominal pain, and nausea. Pertinent negatives include no vomiting.        PAST MEDICAL HISTORY   has a past medical history of Allergy, Anesthesia, Anxiety, Arthritis, Bowel habit changes, Cold (05/03/2019), High cholesterol (08/2018), History of anemia, endometriosis, Migraine, Pain (05/2019), Pneumonia (12/2018), Polycystic ovaries, PONV (postoperative nausea and vomiting), Psychiatric problem, Seizure (HCC)  (06/2017), Stroke (HCC) (08/2018), and Thyroid condition.    SURGICAL HISTORY   has a past surgical history that includes laparoscopy (11/15/2007); tonsillectomy (2000); sandra by laparoscopy (6/6/2009); cholecystoenterostomy (2002); carpal tunnel release (3/24/2009); hysteroscopy with video operative (4/7/08); pelviscopy (9/3/08); lysis adhesions gyn (9/3/08); primary c section (1/13/2011); vaginal hysterectomy total (4/4/2011); cystoscopy (4/4/2011); abdominoplasty (4/28/2011); liposuction (4/28/2011); breast implant revision (10/11/2011); mastopexy (10/11/2011); scar revision (10/11/2011); pelviscopy (4/3/2013); laparoscopy (1/15/2014); laparoscopy robotic xi (9/1/2015); colon resection robotic (Right, 12/7/2015); groin exploration (Left, 8/30/2016); ventral hernia repair (8/30/2016); inguinal hernia repair robotic (Left, 9/27/2017); low anterior resection robotic xi (N/A, 3/14/2018); suspension of breast (Bilateral, 5/13/2019); breast implant revision (Bilateral, 5/13/2019); capsulectomy (Right, 5/13/2019); mammoplasty augmentation (Bilateral, 2000); breast implant revision (Left, 7/24/2019); capsulectomy (N/A, 7/24/2019); breast reconstruction (N/A, 7/24/2019); hysterectomy, total abdominal; lap,diagnostic abdomen (8/12/2020); and laparoscopic lysis of adhesions (8/12/2020).    SOCIAL HISTORY  Social History     Tobacco Use    Smoking status: Never Smoker    Smokeless tobacco: Never Used    Tobacco comment: exposed to secondhand smoke in childhood   Vaping Use    Vaping Use: Never used   Substance Use Topics    Alcohol use: Yes     Alcohol/week: 0.6 oz     Types: 1 Shots of liquor per week     Comment: ocasionally    Drug use: No      Social History     Substance and Sexual Activity   Drug Use No       FAMILY HISTORY  Family History   Problem Relation Age of Onset    Arthritis Father         gout    Heart Disease Paternal Grandfather     Hypertension Other     Stroke Other        CURRENT MEDICATIONS  Home  "Medications       Reviewed by Bee Ennis R.N. (Registered Nurse) on 05/04/22 at 1704  Med List Status: <None>     Medication Last Dose Status   buPROPion (WELLBUTRIN XL) 300 MG XL tablet  Active   cyclobenzaprine (FLEXERIL) 10 mg Tab  Active   Dapsone 7.5 % Gel  Active   DULoxetine (CYMBALTA) 30 MG Cap DR Particles  Active   DULoxetine (CYMBALTA) 60 MG Cap DR Particles delayed-release capsule  Active   levothyroxine (SYNTHROID) 100 MCG Tab  Active   metoclopramide (REGLAN) 10 MG Tab  Active   ondansetron (ZOFRAN ODT) 4 MG TABLET DISPERSIBLE  Active   pregabalin (LYRICA) 150 MG Cap  Active   promethazine (PHENERGAN) 25 MG Suppos  Active                    ALLERGIES  Allergies   Allergen Reactions    Clindamycin Myalgia     Joints swell    Methylprednisolone      Increased heart rate, into a-fib.    Morphine Swelling     Swelling; redness    Norco [Apap-Fd&C Blue #1-Hydrocodone] Rash     Trouble breathing    Tramadol Palpitations     palpitations    Demerol Rash     Rash    Dilaudid [Hydromorphone] Hives and Unspecified     \"Makes my entire body tight\"  Hydrocodone      Latex Itching     Itch, rash    Other Misc Itching     \"All Metals\" Blisters    Sulfa Drugs Hives, Rash and Anaphylaxis     Also feel \"high\"    Tape Rash     Paper tape ok for IVs    Clindamycin Hcl      Other reaction(s): Unknown    Kenalog      Other reaction(s): Unknown    Levaquin      Other reaction(s): Facial swelling    Trazodone Hives     Hives       PHYSICAL EXAM  VITAL SIGNS: /106   Pulse 90   Temp 36.8 °C (98.3 °F) (Temporal)   Resp 16   Ht 1.6 m (5' 3\")   Wt 71.7 kg (158 lb)   LMP 12/01/2011 (Approximate)   SpO2 99%   BMI 27.99 kg/m²      Constitutional: Alert in no apparent distress.  HENT: No signs of trauma, Bilateral external ears normal, Nose normal.   Eyes: Pupils are equal and reactive, Conjunctiva normal, Non-icteric.   Neck: Normal range of motion, No tenderness, Supple, No stridor.   Lymphatic: No lymphadenopathy " noted.   Cardiovascular: Regular rate and rhythm, no palpable thrill  Thorax & Lungs: No respiratory distress,  No chest tenderness.  CTAB  Abdomen: Tenderness in epigastric region. Bowel sounds normal, Soft, No masses, No pulsatile masses. No peritoneal signs.  Skin: Warm, Dry, No erythema, No rash.   : Guaiac negative stool.   Back: No bony tenderness, No CVA tenderness.   Extremities: Intact distal pulses, No edema, No tenderness, No cyanosis.  Musculoskeletal: Good range of motion in all major joints. No tenderness to palpation or major deformities noted.   Neurologic: Alert , Normal motor function, Normal sensory function, No focal deficits noted.   Psychiatric: Affect normal, Judgment normal, Mood normal.     DIAGNOSTIC STUDIES / PROCEDURES    EKG Interpretation:  Interpreted by me    12 Lead EKG interpreted by me to show:  Normal sinus rhythm  Rate 89  Axis: Normal  Intervals: Normal  Normal T waves. No T wave inversion.   Normal ST segments. No ST elevation or depression.   My impression of this EKG: Does not indicate ischemia or arrhythmia at this time.       LABS  Labs Reviewed   COMP METABOLIC PANEL - Abnormal; Notable for the following components:       Result Value    Co2 19 (*)     Anion Gap 17.0 (*)     Albumin 5.0 (*)     All other components within normal limits    Narrative:     Indicate which anticoagulants the patient is on:->UNKNOWN   PROTHROMBIN TIME - Abnormal; Notable for the following components:    PT 11.3 (*)     All other components within normal limits    Narrative:     Indicate which anticoagulants the patient is on:->UNKNOWN   APTT - Abnormal; Notable for the following components:    APTT 36.1 (*)     All other components within normal limits    Narrative:     Indicate which anticoagulants the patient is on:->UNKNOWN   CBC WITH DIFFERENTIAL    Narrative:     Indicate which anticoagulants the patient is on:->UNKNOWN   TROPONIN    Narrative:     Indicate which anticoagulants the  patient is on:->UNKNOWN   COD (ADULT)   LIPASE    Narrative:     Indicate which anticoagulants the patient is on:->UNKNOWN   ESTIMATED GFR    Narrative:     Indicate which anticoagulants the patient is on:->UNKNOWN     All labs reviewed by me.    RADIOLOGY  CT-ABDOMEN-PELVIS WITH   Final Result      1.  No acute abnormality within the abdomen or pelvis      2.  Bowel postoperative changes      3.  Prior hysterectomy      DX-CHEST-PORTABLE (1 VIEW)   Final Result      No acute cardiopulmonary disease.         The radiologist's interpretation of all radiological studies have been reviewed by me.    COURSE & MEDICAL DECISION MAKING  Pertinent Labs & Imaging studies reviewed. (See chart for details)    This is a 42 y.o. female that presents with report of abdominal pain as well as dark stools.  On rectal exam there is no blood.  I evaluate the patient for pancreatitis as well as hepatitis and liver dysfunction.  There is some report of chest pain therefore troponin was gathered as well.  We have a CT scan of the abdomen as well..     7:28 PM - Patient seen and examined at bedside. Ordered CT-abdomen, DX-chest, COD, CMP, Lipase, CBC with diff, Troponin, and EKG.      7:52 PM - Performed rectal exam.     8:47 PM - Patient was reevaluated at bedside. Discussed lab and radiology results with the patent and informed them that everything looks good. I discussed plan for discharge and follow up as outlined below. The patient is stable for discharge at this time and will return for any new or worsening symptoms. Patient verbalizes understanding and support with my plan for discharge.      EKG is nonischemic.  A chest x-ray is negative and the CT is negative.  The patient is not significantly anemic.  There is a mild anion gap.  The patient has a low heart score.  We will have the patient follow-up with her primary care physician.     The patient will return for new or worsening symptoms and is stable at the time of  discharge.    The patient is referred to a primary physician for blood pressure management, diabetic screening, and for all other preventative health concerns.    DISPOSITION:  Patient will be discharged home in stable condition.    FOLLOW UP:  Hamzah Fegruson M.D.  [address] [facility] [address]  [phone]    In 2 days      OUTPATIENT MEDICATIONS:  Discharge Medication List as of 5/4/2022  8:52 PM          FINAL IMPRESSION  1. Rectal bleeding          Danita HUNTER (Scribe), am scribing for, and in the presence of, Nirav Gray M.D..    Electronically signed by: Danita Knight (Saji), 5/4/2022    Nirav HUNTER M.D. personally performed the services described in this documentation, as scribed by Danita Knight in my presence, and it is both accurate and complete.     The note accurately reflects work and decisions made by me.  Nirav Gray M.D.  5/4/2022  11:54 PM

## 2022-05-05 NOTE — ED NOTES
Assumed patient care for discharge only.    IV discontinued with cathlon intact    Discharge home with instructions, follow up care and rxn reviewed and given to patient with verbal understanding.      Ambulatory with a steady gait and stable condition

## 2022-05-23 ENCOUNTER — OFFICE VISIT (OUTPATIENT)
Dept: URGENT CARE | Facility: CLINIC | Age: 42
End: 2022-05-23
Payer: COMMERCIAL

## 2022-05-23 VITALS
WEIGHT: 168.4 LBS | HEIGHT: 63 IN | BODY MASS INDEX: 29.84 KG/M2 | RESPIRATION RATE: 16 BRPM | HEART RATE: 108 BPM | TEMPERATURE: 98.9 F | DIASTOLIC BLOOD PRESSURE: 72 MMHG | SYSTOLIC BLOOD PRESSURE: 116 MMHG | OXYGEN SATURATION: 98 %

## 2022-05-23 DIAGNOSIS — J32.9 CHRONIC SINUSITIS, UNSPECIFIED LOCATION: ICD-10-CM

## 2022-05-23 DIAGNOSIS — J32.9 RHINOSINUSITIS: ICD-10-CM

## 2022-05-23 PROCEDURE — 99204 OFFICE O/P NEW MOD 45 MIN: CPT | Performed by: STUDENT IN AN ORGANIZED HEALTH CARE EDUCATION/TRAINING PROGRAM

## 2022-05-23 RX ORDER — CETIRIZINE HYDROCHLORIDE, PSEUDOEPHEDRINE HYDROCHLORIDE 5; 120 MG/1; MG/1
1 TABLET, FILM COATED, EXTENDED RELEASE ORAL 2 TIMES DAILY
Qty: 60 TABLET | Refills: 0 | Status: SHIPPED | OUTPATIENT
Start: 2022-05-23 | End: 2022-09-13

## 2022-05-23 RX ORDER — CEFDINIR 300 MG/1
300 CAPSULE ORAL 2 TIMES DAILY
Qty: 10 CAPSULE | Refills: 0 | Status: SHIPPED | OUTPATIENT
Start: 2022-05-23 | End: 2022-05-28

## 2022-05-23 RX ORDER — AZELASTINE 1 MG/ML
1 SPRAY, METERED NASAL 2 TIMES DAILY
Qty: 30 ML | Refills: 1 | Status: SHIPPED | OUTPATIENT
Start: 2022-05-23 | End: 2022-09-13

## 2022-05-23 RX ORDER — FLUTICASONE PROPIONATE 50 MCG
2 SPRAY, SUSPENSION (ML) NASAL
Qty: 16 G | Refills: 1 | Status: SHIPPED | OUTPATIENT
Start: 2022-05-23 | End: 2023-05-17

## 2022-05-23 ASSESSMENT — FIBROSIS 4 INDEX: FIB4 SCORE: 0.58

## 2022-05-23 NOTE — PROGRESS NOTES
Subjective:   CHIEF COMPLAINT  Chief Complaint   Patient presents with   • Nasal Congestion     X 3 days having nasal congestion, ear pain, facial pressure, sinus headache       HPI  Kathleen Alvarado is a 42 y.o. female who presents with a chief complaint of a sinus infection.  For the last 3 days patient has been experiencing a runny nose, right-sided sore throat and right-sided sinus congestion.  And this morning the patient reports there has been persistent green mucus discharge coming from her right nose associated with bilateral ear discomfort.  She has a PMH of chronic sinusitis status post 7 sinus surgeries.  She does not tolerate sinus rinses.  She is not on any nasal sprays.  She has tried Claritin and Sudafed which have not helped.  Receives Kenalog shots via ENT; last was approximately 2 months ago.  Symptoms have been keeping her up at night.  She denies associated symptoms of wheezing or shortness of breath.  Patient is vaccinated COVID x2.  No booster.    REVIEW OF SYSTEMS  General: no fever or chills  GI: no nausea or vomiting  See HPI for further details.    PAST MEDICAL HISTORY  Patient Active Problem List    Diagnosis Date Noted   • Melena 05/04/2022   • Postural dizziness with presyncope 05/04/2022   • Abdominal pain with vomiting and history of abdominal surgery 02/01/2022   • Generalized anxiety disorder 01/25/2022   • Hepatic lesion 01/25/2022   • Post concussion syndrome 01/05/2022   • Insomnia due to medical condition 01/05/2022   • Fatigue 10/25/2021   • Migraine 10/25/2021   • Hormone replacement therapy 10/25/2021   • History of TIA (transient ischemic attack) 10/25/2021   • Depression 06/15/2021   • Gastroparesis 05/18/2021   • Vitamin D deficiency 05/18/2021   • Abdominal pain 03/18/2018   • Narcotic dependence (HCC) 12/25/2015   • Chronic back pain 07/21/2011   • Seasonal allergies 07/21/2011   • Acne 07/21/2011   • Hypothyroidism 07/21/2011       SURGICAL HISTORY   has a past surgical  "history that includes laparoscopy (11/15/2007); tonsillectomy (2000); sandra by laparoscopy (6/6/2009); cholecystoenterostomy (2002); carpal tunnel release (3/24/2009); hysteroscopy with video operative (4/7/08); pelviscopy (9/3/08); lysis adhesions gyn (9/3/08); primary c section (1/13/2011); vaginal hysterectomy total (4/4/2011); cystoscopy (4/4/2011); abdominoplasty (4/28/2011); liposuction (4/28/2011); breast implant revision (10/11/2011); mastopexy (10/11/2011); scar revision (10/11/2011); pelviscopy (4/3/2013); laparoscopy (1/15/2014); laparoscopy robotic xi (9/1/2015); colon resection robotic (Right, 12/7/2015); groin exploration (Left, 8/30/2016); ventral hernia repair (8/30/2016); inguinal hernia repair robotic (Left, 9/27/2017); low anterior resection robotic xi (N/A, 3/14/2018); suspension of breast (Bilateral, 5/13/2019); breast implant revision (Bilateral, 5/13/2019); capsulectomy (Right, 5/13/2019); mammoplasty augmentation (Bilateral, 2000); breast implant revision (Left, 7/24/2019); capsulectomy (N/A, 7/24/2019); breast reconstruction (N/A, 7/24/2019); hysterectomy, total abdominal; lap,diagnostic abdomen (8/12/2020); and laparoscopic lysis of adhesions (8/12/2020).    ALLERGIES  Allergies   Allergen Reactions   • Clindamycin Myalgia     Joints swell   • Methylprednisolone      Increased heart rate, into a-fib.   • Morphine Swelling     Swelling; redness   • Norco [Apap-Fd&C Blue #1-Hydrocodone] Rash     Trouble breathing   • Tramadol Palpitations     palpitations   • Demerol Rash     Rash   • Dilaudid [Hydromorphone] Hives and Unspecified     \"Makes my entire body tight\"  Hydrocodone     • Latex Itching     Itch, rash   • Other Misc Itching     \"All Metals\" Blisters   • Sulfa Drugs Hives, Rash and Anaphylaxis     Also feel \"high\"   • Tape Rash     Paper tape ok for IVs   • Clindamycin Hcl      Other reaction(s): Unknown   • Kenalog      Other reaction(s): Unknown   • Levaquin      Other reaction(s): " "Facial swelling   • Trazodone Hives     Hives       CURRENT MEDICATIONS  Home Medications     Reviewed by Luisito Nelson D.O. (Physician) on 05/23/22 at 1417  Med List Status: <None>   Medication Last Dose Status   buPROPion (WELLBUTRIN XL) 300 MG XL tablet Taking Active   cyclobenzaprine (FLEXERIL) 10 mg Tab Taking Active   Dapsone 7.5 % Gel Taking Active   DULoxetine (CYMBALTA) 30 MG Cap DR Particles Taking Active   DULoxetine (CYMBALTA) 60 MG Cap DR Particles delayed-release capsule Taking Active   levothyroxine (SYNTHROID) 100 MCG Tab Taking Active   metoclopramide (REGLAN) 10 MG Tab Taking Active   ondansetron (ZOFRAN ODT) 4 MG TABLET DISPERSIBLE Taking Active   pregabalin (LYRICA) 150 MG Cap Taking Active   promethazine (PHENERGAN) 25 MG Suppos PRN Active                SOCIAL HISTORY  Social History     Tobacco Use   • Smoking status: Never Smoker   • Smokeless tobacco: Never Used   • Tobacco comment: exposed to secondhand smoke in childhood   Vaping Use   • Vaping Use: Never used   Substance and Sexual Activity   • Alcohol use: Yes     Alcohol/week: 0.6 oz     Types: 1 Shots of liquor per week     Comment: ocasionally   • Drug use: No   • Sexual activity: Yes     Partners: Male       FAMILY HISTORY  Family History   Problem Relation Age of Onset   • Arthritis Father         gout   • Heart Disease Paternal Grandfather    • Hypertension Other    • Stroke Other           Objective:   PHYSICAL EXAM  VITAL SIGNS: /72 (BP Location: Left arm, Patient Position: Sitting, BP Cuff Size: Adult)   Pulse (!) 108   Temp 37.2 °C (98.9 °F) (Temporal)   Resp 16   Ht 1.6 m (5' 3\")   Wt 76.4 kg (168 lb 6.4 oz)   LMP 12/01/2011 (Approximate)   SpO2 98%   BMI 29.83 kg/m²     Gen: no acute distress, normal voice  Skin: dry, intact, moist mucosal membranes  ENT: TMs clear intact bilaterally without bulging, erythema or effusion.  Lungs: CTAB w/ symmetric expansion  CV: RRR w/o murmurs or clicks  Psych: normal " affect, normal judgement, alert, awake    Assessment/Plan:     1. Rhinosinusitis  cefdinir (OMNICEF) 300 MG Cap    cetirizine-psuedoephedrine (ZYRTEC-D) 5-120 MG per tablet    azelastine (ASTELIN) 137 MCG/SPRAY nasal spray    fluticasone (FLONASE) 50 MCG/ACT nasal spray   2. Chronic sinusitis, unspecified location  cefdinir (OMNICEF) 300 MG Cap    cetirizine-psuedoephedrine (ZYRTEC-D) 5-120 MG per tablet    azelastine (ASTELIN) 137 MCG/SPRAY nasal spray    fluticasone (FLONASE) 50 MCG/ACT nasal spray   Chronic issue with acute exacerbation.  History of 7 sinus surgeries.  She receives Kenalog shots from her ENT; last was approximately 3 weeks ago.  She has a history of colon volvulus and SBO status post colectomy subsequently does not metabolize p.o. steroids. She does not tolerate sinus rinses.   - Ordered Rx for azelastine nasal spray  - Ordered Flonase  - Ordered Rx for Zyrtec-D  - Ordered Rx for cefdinir; instructed to begin this medication after 2 days only if above treatment measures do not provide any symptomatic relief  - Return to urgent care any new/worsening symptoms or further questions or concerns.  Patient understood everything discussed.  All questions were answered.      Differential diagnosis, natural history, supportive care, and indications for immediate follow-up discussed. All questions answered. Patient agrees with the plan of care.    Follow-up as needed if symptoms worsen or fail to improve to PCP, Urgent care or Emergency Room.    Please note that this dictation was created using voice recognition software. I have made a reasonable attempt to correct obvious errors, but I expect that there are errors of grammar and possibly content that I did not discover before finalizing the note.

## 2022-06-01 ENCOUNTER — TELEPHONE (OUTPATIENT)
Dept: INTERNAL MEDICINE | Facility: OTHER | Age: 42
End: 2022-06-01
Payer: COMMERCIAL

## 2022-06-01 NOTE — TELEPHONE ENCOUNTER
VOICEMAIL  1. Caller Name: Kathleen                      Call Back Number: [phone]    2. Message: pt lvm stating her pharmacy has been changed to [facility] and needs a refill of Wellbutrin 150mg. Per pt she takes both Wellbutrin 150mg and 300mg

## 2022-06-03 RX ORDER — BUPROPION HYDROCHLORIDE 150 MG/1
150 TABLET ORAL EVERY MORNING
Qty: 90 TABLET | Refills: 1 | Status: SHIPPED | OUTPATIENT
Start: 2022-06-03 | End: 2023-03-14

## 2022-07-18 NOTE — TELEPHONE ENCOUNTER
Last seen: 05/04/22 by Dr. Maty Yeboah appt: None    Was the patient seen in the last year in this department? Yes   Does patient have an active prescription for medications requested? No   Received Request Via: Pharmacy

## 2022-07-19 RX ORDER — METOCLOPRAMIDE 10 MG/1
TABLET ORAL
Qty: 270 TABLET | Refills: 0 | Status: SHIPPED | OUTPATIENT
Start: 2022-07-19 | End: 2023-03-14

## 2022-07-30 NOTE — CARE PLAN
Problem: Discharge Barriers/Planning  Goal: Patient's continuum of care needs will be met  Outcome: PROGRESSING AS EXPECTED  Discussed DC POC with pt     Problem: Pain Management  Goal: Pain level will decrease to patient's comfort goal  Established pain schedule with pt        Willard Loomis; YUKO; MS)  Neurosurgery  [address], [address]  [place] [address]  Phone: [phone]  Fax: [phone]  Follow Up Time:     Yaron Alexander)  Hematology; Internal Medicine; Medical Oncology  [address], [address]  [place] [address]  Phone: [phone]  Fax: [phone]  Follow Up Time:     Rafael Funes  RADIATION ONCOLOGY  [address] [place] [address]  Phone: [phone]  Fax: [phone]  Follow Up Time:     Satish Hopkins)  Internal Medicine; Pulmonary Disease  [address], [address]  [place] [address]  Phone: [phone]  Fax: [phone]  Follow Up Time:     Satish Alcaraz)  Cardiovascular Disease; Interventional Cardiology; Nuclear Cardiology  [address], [address]  [place] [address]  Phone: [phone]  Fax: [phone]  Follow Up Time:

## 2022-08-01 RX ORDER — DULOXETIN HYDROCHLORIDE 60 MG/1
CAPSULE, DELAYED RELEASE ORAL
Qty: 90 CAPSULE | OUTPATIENT
Start: 2022-08-01

## 2022-08-01 NOTE — TELEPHONE ENCOUNTER
Duloxetine Refill    Last seen: 5/4/22 by Dr. Maty Yeboah appt: None    Was the patient seen in the last year in this department? Yes   Does patient have an active prescription for medications requested? No   Received Request Via: Pharmacy

## 2022-09-13 ENCOUNTER — OFFICE VISIT (OUTPATIENT)
Dept: INTERNAL MEDICINE | Facility: OTHER | Age: 42
End: 2022-09-13
Payer: COMMERCIAL

## 2022-09-13 VITALS
HEIGHT: 63 IN | BODY MASS INDEX: 29.77 KG/M2 | WEIGHT: 168 LBS | DIASTOLIC BLOOD PRESSURE: 88 MMHG | SYSTOLIC BLOOD PRESSURE: 127 MMHG | TEMPERATURE: 97.7 F | HEART RATE: 72 BPM | OXYGEN SATURATION: 98 %

## 2022-09-13 DIAGNOSIS — E03.9 HYPOTHYROIDISM, UNSPECIFIED TYPE: Chronic | ICD-10-CM

## 2022-09-13 DIAGNOSIS — F07.81 POST CONCUSSION SYNDROME: ICD-10-CM

## 2022-09-13 DIAGNOSIS — Z12.31 ENCOUNTER FOR SCREENING MAMMOGRAM FOR MALIGNANT NEOPLASM OF BREAST: ICD-10-CM

## 2022-09-13 DIAGNOSIS — G43.909 MIGRAINE WITHOUT STATUS MIGRAINOSUS, NOT INTRACTABLE, UNSPECIFIED MIGRAINE TYPE: ICD-10-CM

## 2022-09-13 DIAGNOSIS — E55.9 VITAMIN D DEFICIENCY: ICD-10-CM

## 2022-09-13 PROBLEM — Z12.39 SCREENING FOR BREAST CANCER: Status: ACTIVE | Noted: 2022-09-13

## 2022-09-13 PROCEDURE — 99213 OFFICE O/P EST LOW 20 MIN: CPT | Mod: GE | Performed by: STUDENT IN AN ORGANIZED HEALTH CARE EDUCATION/TRAINING PROGRAM

## 2022-09-13 RX ORDER — DULOXETIN HYDROCHLORIDE 30 MG/1
60 CAPSULE, DELAYED RELEASE ORAL DAILY
Qty: 180 CAPSULE | Refills: 1 | Status: SHIPPED | OUTPATIENT
Start: 2022-09-13 | End: 2022-09-19 | Stop reason: DRUGHIGH

## 2022-09-13 RX ORDER — CALCIUM CARBONATE 300MG(750)
1 TABLET,CHEWABLE ORAL DAILY
Qty: 90 TABLET | Refills: 0 | Status: SHIPPED | OUTPATIENT
Start: 2022-09-13 | End: 2022-12-12

## 2022-09-13 RX ORDER — ONDANSETRON 4 MG/1
4 TABLET, ORALLY DISINTEGRATING ORAL EVERY 6 HOURS PRN
Qty: 5 TABLET | Refills: 0 | Status: SHIPPED | OUTPATIENT
Start: 2022-09-13 | End: 2024-03-05

## 2022-09-13 ASSESSMENT — FIBROSIS 4 INDEX: FIB4 SCORE: 0.58

## 2022-09-14 ASSESSMENT — ENCOUNTER SYMPTOMS
MEMORY LOSS: 0
VOMITING: 0
FOCAL WEAKNESS: 0
SEIZURES: 0
HEADACHES: 1
FEVER: 0
DOUBLE VISION: 0
CHILLS: 0
BRUISES/BLEEDS EASILY: 0
SPEECH CHANGE: 0
SORE THROAT: 0
POLYDIPSIA: 0
BLURRED VISION: 0
NAUSEA: 0
FALLS: 0
ABDOMINAL PAIN: 0
MYALGIAS: 0
LOSS OF CONSCIOUSNESS: 0
PALPITATIONS: 0
INSOMNIA: 0
DIZZINESS: 1
COUGH: 0
SHORTNESS OF BREATH: 0

## 2022-09-15 NOTE — PROGRESS NOTES
Established Patient    Kathleen Alvarado is a 42 y.o. female who presents today with the following:    CC: postconcussive symptoms    HPI: 41 yo F presents today for symptoms related to postconcussive  syndrome. In December of 2021 she had slipped on snow and hit her occipital area of her head on the pavement, she was evaluated in the ED where imaging was negative and she was diagnosed with a concussion. On August 5th she had [place] and hit her head again in the same location and had experienced vertigo and vomiting  days after this fall. The vomiting has resolved but she has residual vertigo and on extraocular movement exam had some mild dizziness. Her neurological exam is within normal limits but she does have subjective residual postconcussive symptoms.     Review of Systems   Constitutional:  Negative for chills and fever.   HENT:  Negative for hearing loss and sore throat.    Eyes:  Negative for blurred vision and double vision.   Respiratory:  Negative for cough and shortness of breath.    Cardiovascular:  Negative for chest pain and palpitations.   Gastrointestinal:  Negative for abdominal pain, nausea and vomiting.   Genitourinary:  Negative for dysuria and hematuria.   Musculoskeletal:  Negative for falls and myalgias.   Skin:  Negative for itching and rash.   Neurological:  Positive for dizziness and headaches. Negative for speech change, focal weakness, seizures and loss of consciousness.   Endo/Heme/Allergies:  Negative for polydipsia. Does not bruise/bleed easily.   Psychiatric/Behavioral:  Negative for memory loss. The patient does not have insomnia.       Patient Active Problem List    Diagnosis Date Noted    Screening for breast cancer 09/13/2022    Melena 05/04/2022    Postural dizziness with presyncope 05/04/2022    Abdominal pain with vomiting and history of abdominal surgery 02/01/2022    Generalized anxiety disorder 01/25/2022    Hepatic lesion 01/25/2022     Post concussion syndrome 01/05/2022    Insomnia due to medical condition 01/05/2022    Fatigue 10/25/2021    Migraine 10/25/2021    Hormone replacement therapy 10/25/2021    History of TIA (transient ischemic attack) 10/25/2021    Depression 06/15/2021    Gastroparesis 05/18/2021    Vitamin D deficiency 05/18/2021    Abdominal pain 03/18/2018    Narcotic dependence (HCC) 12/25/2015    Chronic back pain 07/21/2011    Seasonal allergies 07/21/2011    Acne 07/21/2011    Hypothyroidism 07/21/2011       Social History     Tobacco Use    Smoking status: Never    Smokeless tobacco: Never    Tobacco comments:     exposed to secondhand smoke in childhood   Vaping Use    Vaping Use: Never used   Substance Use Topics    Alcohol use: Yes     Alcohol/week: 0.6 oz     Types: 1 Shots of liquor per week     Comment: ocasionally    Drug use: No       Current Outpatient Medications   Medication Sig Dispense Refill    DULoxetine (CYMBALTA) 30 MG Cap DR Particles Take 2 Capsules by mouth every day. 180 Capsule 1    ondansetron (ZOFRAN ODT) 4 MG TABLET DISPERSIBLE Take 1 Tablet by mouth every 6 hours as needed for Nausea. 5 Tablet 0    Cholecalciferol (VITAMIN D3) 25 MCG (1000 UT) Chew Tab Chew 1 Tablet every day. 90 Tablet 0    metoclopramide (REGLAN) 10 MG Tab TAKE 1 TABLET BY MOUTHTHREE TIMES DAILY PRIOR TO MEALS 270 Tablet 0    buPROPion (WELLBUTRIN XL) 150 MG XL tablet Take 1 Tablet by mouth every morning. 90 Tablet 1    Dapsone 7.5 % Gel APPLY 1 PUMP TO SKIN TWICE A DAY APPLY TO FACE      buPROPion (WELLBUTRIN XL) 300 MG XL tablet Take 1 Tablet by mouth every morning. Takes a 300 and a 150mg daily 90 Tablet 1    levothyroxine (SYNTHROID) 100 MCG Tab TAKE 1 TABLET BY MOUTH EVERY DAY 90 Tablet 1    cyclobenzaprine (FLEXERIL) 10 mg Tab       pregabalin (LYRICA) 150 MG Cap       promethazine (PHENERGAN) 25 MG Suppos Insert 1 Suppository into the rectum every 8 hours as needed for Nausea/Vomiting for up to 15 doses. 15 Suppository 0     "fluticasone (FLONASE) 50 MCG/ACT nasal spray Administer 2 Sprays into affected nostril(S) at bedtime. (Patient not taking: Reported on 9/13/2022) 16 g 1     No current facility-administered medications for this visit.       /88 (BP Location: Left arm, Patient Position: Sitting, BP Cuff Size: Adult)   Pulse 72   Temp 36.5 °C (97.7 °F) (Temporal)   Ht 1.6 m (5' 3\")   Wt 76.2 kg (168 lb)   LMP 12/01/2011 (Approximate)   SpO2 98%   BMI 29.76 kg/m²     Physical Exam  General: Well developed, well nourished female, in no distress.  Eyes: Conjuntiva without any obvious injection or erythema.   Cardiovascular: Heart is regular with no murmurs  Lungs: Clear to auscultation bilaterally. No wheezes, rhonci or crackles heard. Respiratory effort is normal.  Abd: Soft, non-tender  Ext: No edema  Neuro: no focal deficit, EOMI, no dysmetria on cerebellar exam      Medical Decision Making, Assessment and Plan    1. Post concussion syndrome  This patient has experienced a concussion last year and recently experienced a repeat head trauma without loss of consciousness that resulted in vertigo and vomiting a month ago. Her acute symptoms have resolved but she still has residual symptoms, particularly migraines. While concussions generally are self limited, given her symptoms this many weeks from her recent head trauma, she would benefit from concussion specific physical therapy and further evaluation by a neurologist for concussion specific rehabilitative therapy.    2. Migraine without status migrainosus, not intractable, unspecified migraine type  Patient reports that after her recent head trauma she has had migraine headaches. She will try over the counter symptomatic relief agents but will also be referred to neurology for further management of migraines within the specific context of postconcussive syndrome    3. Vitamin D deficiency  Patient has vitamin d deficiency and was prescribed vitamin d supplementation. She " is to have her Vitamin D level checked again in the future to assess if supplementation is adequate.    4. Hypothyroidism, unspecified type  Patient has a history of hypothyroidism and has been taking levothyroxine 100 mcg with no reports of symptoms. She will have her TSH checked to ensure she is taking the appropriate amount of thyroid hormone.    5. Encounter for screening mammogram for malignant neoplasm of breast  Patient is due for regular breast cancer screening so she will be referred for mammography.     [facility] patient case discussed with Dr. Mathias    Follow up in 3 months    Signed by: Hamzah Ferguson M.D.

## 2022-09-16 ENCOUNTER — TELEPHONE (OUTPATIENT)
Dept: INTERNAL MEDICINE | Facility: OTHER | Age: 42
End: 2022-09-16
Payer: COMMERCIAL

## 2022-09-16 NOTE — TELEPHONE ENCOUNTER
Patient called and she received the duloxetine but is stating that the Cymbalta worked better, she would like a new RX for name brand only of this medication. A RX for this medication was sent 9/13/22 and the pharmacy does have it, but she does not want the generic brand. Please advise

## 2022-09-19 RX ORDER — DULOXETIN HYDROCHLORIDE 60 MG/1
60 CAPSULE, DELAYED RELEASE ORAL DAILY
Qty: 90 CAPSULE | Refills: 1 | Status: SHIPPED | OUTPATIENT
Start: 2022-09-19 | End: 2023-05-17

## 2022-09-20 NOTE — TELEPHONE ENCOUNTER
Hamzah Ferguson M.D.  [facility] 14 hours ago (6:07 PM)     [facility]  New prescription filled for cymbalta name brand

## 2022-10-07 ENCOUNTER — HOSPITAL ENCOUNTER (OUTPATIENT)
Dept: RADIOLOGY | Facility: MEDICAL CENTER | Age: 42
End: 2022-10-07
Attending: STUDENT IN AN ORGANIZED HEALTH CARE EDUCATION/TRAINING PROGRAM
Payer: COMMERCIAL

## 2022-10-07 DIAGNOSIS — Z12.31 ENCOUNTER FOR SCREENING MAMMOGRAM FOR MALIGNANT NEOPLASM OF BREAST: ICD-10-CM

## 2022-10-07 PROCEDURE — 77063 BREAST TOMOSYNTHESIS BI: CPT

## 2022-12-12 RX ORDER — MELATONIN
Qty: 90 TABLET | Refills: 0 | Status: SHIPPED | OUTPATIENT
Start: 2022-12-12 | End: 2023-03-18

## 2023-01-11 NOTE — TELEPHONE ENCOUNTER
Bupropion Refill     Received request via: Pharmacy    Was the patient seen in the last year in this department? Yes    Does the patient have an active prescription (recently filled or refills available) for medication(s) requested? No    Does the patient have retirement Plus and need 100 day supply (blood pressure, diabetes and cholesterol meds only)? Patient does not have SCP

## 2023-01-13 RX ORDER — BUPROPION HYDROCHLORIDE 300 MG/1
TABLET ORAL
Qty: 90 TABLET | Refills: 1 | Status: SHIPPED | OUTPATIENT
Start: 2023-01-13 | End: 2023-03-14

## 2023-03-02 PROBLEM — M25.371 ANKLE INSTABILITY, RIGHT: Status: ACTIVE | Noted: 2023-03-02

## 2023-06-13 NOTE — ED AVS SNAPSHOT
Home Care Instructions                                                                                                                Kathleen Alvarado   MRN: [id number]    Department:  [facility], Emergency Dept   Date of Visit:  3/14/2017            [facility], Emergency Dept    [address]    [place] [address]    Phone:  [phone]      You were seen by     Maurice Wood M.D.      Your Diagnosis Was     Shoulder dislocation, left, initial encounter     S43.005A       These are the medications you received during your hospitalization from 03/14/2017 1128 to 03/14/2017 1536     Date/Time Order Dose Route Action    03/14/2017 1256 morphine (pf) 4 mg/ml injection 4 mg 4 mg Intravenous Given    03/14/2017 1255 ondansetron (ZOFRAN) syringe/vial injection 4 mg 4 mg Intravenous Given    03/14/2017 1343 propofol (DIPRIVAN) injection 140 mg Intravenous New Bag    03/14/2017 1300 NS infusion 1,000 mL 1,000 mL Intravenous New Bag    03/14/2017 1358 ketorolac (TORADOL) injection 30 mg 30 mg Intravenous Given      Follow-up Information     1. Schedule an appointment as soon as possible for a visit with Dylan Platt M.D..    Specialty:  Orthopaedics    Contact information    [address]  [address]  [place] [address] [phone]        Medication Information     Review all of your home medications and newly ordered medications with your primary doctor and/or pharmacist as soon as possible. Follow medication instructions as directed by your doctor and/or pharmacist.     Please keep your complete medication list with you and share with your physician. Update the information when medications are discontinued, doses are changed, or new medications (including over-the-counter products) are added; and carry medication information at all times in the event of emergency situations.               Medication List      ASK your doctor about these medications          Instructions    Morning Afternoon Evening Bedtime    alprazolam 2 MG tablet   Commonly known as:  XANAX        Take 2 mg by mouth at bedtime as needed for Sleep.   Dose:  2 mg                        busPIRone 15 MG tablet   Commonly known as:  BUSPAR        Doctor's comments:  No further renewals from this office without a clinic visit with a new provider   Take 1 Tab by mouth 2 times a day.   Dose:  15 mg                        estradiol 0.1 MG/24HR Pttw   Commonly known as:  VIVELLE DOT        Apply 1 Patch to skin as directed 2 times a week. Use one patch two times per week.   Dose:  1 Patch                        furosemide 20 MG Tabs   Commonly known as:  LASIX        Take 1 Tab by mouth 1 time daily as needed. Indications: Edema   Dose:  20 mg                        * hydrocodone/acetaminophen  MG Tabs   What changed:  Another medication with the same name was added. Make sure you understand how and when to take each.   Commonly known as:  NORCO   Ask about: Which instructions should I use?        Take 1 Tab by mouth 2 times a day as needed.   Dose:  1 Tab                        * hydrocodone/acetaminophen  MG Tabs   What changed:  Another medication with the same name was added. Make sure you understand how and when to take each.   Commonly known as:  NORCO   Ask about: Which instructions should I use?        Take 1 Tab by mouth every 6 hours as needed for Severe Pain.   Dose:  1 Tab                        * hydrocodone/acetaminophen  MG Tabs   What changed:  You were already taking a medication with the same name, and this prescription was added. Make sure you understand how and when to take each.   Commonly known as:  NORCO   Ask about: Which instructions should I use?        Take 1 Tab by mouth every 6 hours as needed.   Dose:  1 Tab                        levothyroxine 100 MCG Tabs   Commonly known as:  SYNTHROID        Doctor's comments:  Needs to have labs done for further  refills.   Take 1 Tab by mouth every day.   Dose:  100 mcg                        * Notice:  This list has 3 medication(s) that are the same as other medications prescribed for you. Read the directions carefully, and ask your doctor or other care provider to review them with you.         Where to Get Your Medications      You can get these medications from any pharmacy     Bring a paper prescription for each of these medications    - hydrocodone/acetaminophen  MG Tabs            Procedures and tests performed during your visit     Procedure/Test Number of Times Performed    DX-CERVICAL SPINE-2 OR 3 VIEWS 1    DX-FOREARM LEFT 1    DX-HAND 3+ LEFT 1    [id number] (WITH 1-VIEW CXR) LEFT 1    DX-SHOULDER 2+ LEFT 2    SALINE LOCK 2        Discharge Instructions       Shoulder Dislocation  Your shoulder is made up of three bones: the collar bone (clavicle); the shoulder blade (scapula), which includes the socket (glenoid cavity); and the upper arm bone (humerus). Your shoulder joint is the place where these bones meet. Strong, fibrous tissues hold these bones together (ligaments). Muscles and strong, fibrous tissues that connect the muscles to these bones (tendons) allow your arm to move through this joint. The range of motion of your shoulder joint is more extensive than most of your other joints, and the glenoid cavity is very shallow. That is the reason that your shoulder joint is one of the most unstable joints in your body. It is far more prone to dislocation than your other joints. Shoulder dislocation is when your humerus is forced out of your shoulder joint.  CAUSES  Shoulder dislocation is caused by a forceful impact on your shoulder. This impact usually is from an injury, such as a sports injury or a fall.  SYMPTOMS  Symptoms of shoulder dislocation include:  · Deformity of your shoulder.  · Intense pain.  · Inability to move your shoulder joint.  · Numbness, weakness, or tingling around your  shoulder joint (your neck or down your arm).  · Bruising or swelling around your shoulder.  DIAGNOSIS  In order to diagnose a dislocated shoulder, your caregiver will perform a physical exam. Your caregiver also may have an X-ray exam done to see if you have any broken bones. Magnetic resonance imaging (MRI) is a procedure that sometimes is done to help your caregiver see any damage to the soft tissues around your shoulder, particularly your rotator cuff tendons. Additionally, your caregiver also may have electromyography done to measure the electrical discharges produced in your muscles if you have signs or symptoms of nerve damage.  TREATMENT  A shoulder dislocation is treated by placing the humerus back in the joint (reduction). Your caregiver does this either manually (closed reduction), by moving your humerus back into the joint through manipulation, or through surgery (open reduction). When your humerus is back in place, severe pain should improve almost immediately.  You also may need to have surgery if you have a weak shoulder joint or ligaments, and you have recurring shoulder dislocations, despite rehabilitation. In rare cases, surgery is necessary if your nerves or blood vessels are damaged during the dislocation.  After your reduction, your arm will be placed in a shoulder immobilizer or sling to keep it from moving. Your caregiver will have you wear your shoulder immobilizer or sling for 3 days to 3 weeks, depending on how serious your dislocation is. When your shoulder immobilizer or sling is removed, your caregiver may prescribe physical therapy to help improve the range of motion in your shoulder joint.  HOME CARE INSTRUCTIONS   The following measures can help to reduce pain and speed up the healing process:  · Rest your injured joint. Do not move it. Avoid activities similar to the one that caused your injury.  · Apply ice to your injured joint for the first day or two after your reduction or as  directed by your caregiver. Applying ice helps to reduce inflammation and pain.  ¨ Put ice in a plastic bag.  ¨ Place a towel between your skin and the bag.  ¨ Leave the ice on for 15-20 minutes at a time, every 2 hours while you are awake.  · Exercise your hand by squeezing a soft ball. This helps to eliminate stiffness and swelling in your hand and wrist.  · Take over-the-counter or prescription medicine for pain or discomfort as told by your caregiver.  SEEK IMMEDIATE MEDICAL CARE IF:   · Your shoulder immobilizer or sling becomes damaged.  · Your pain becomes worse rather than better.  · You lose feeling in your arm or hand, or they become white and cold.  MAKE SURE YOU:   · Understand these instructions.  · Will watch your condition.  · Will get help right away if you are not doing well or get worse.     This information is not intended to replace advice given to you by your health care provider. Make sure you discuss any questions you have with your health care provider.     Document Released: 09/12/2002 Document Revised: 01/08/2016 Document Reviewed: 04/11/2016  [employer] Patient Education ©2016 [employer]            Patient Information     Patient Information    Following emergency treatment: all patient requiring follow-up care must return either to a private physician or a clinic if your condition worsens before you are able to obtain further medical attention, please return to the emergency room.     Billing Information    At [facility], we work to make the billing process streamlined for our patients.  Our Representatives are here to answer any questions you may have regarding your hospital bill.  If you have insurance coverage and have supplied your insurance information to us, we will submit a claim to your insurer on your behalf.  Should you have any questions regarding your bill, we can be reached online or by phone as follows:  Online: You are able pay your bills online or live chat  with our representatives about any billing questions you may have. We are here to help Monday - Friday from 8:00am to 7:30pm and 9:00am - 12:00pm on Saturdays.  Please visit [email or web address]  for more information.   Phone:  [phone] or [phone]    Please note that your emergency physician, surgeon, pathologist, radiologist, anesthesiologist, and other specialists are not employed by [facility] and will therefore bill separately for their services.  Please contact them directly for any questions concerning their bills at the numbers below:     Emergency Physician Services:  [phone]  [facility]:  [phone]  [facility]:  [phone]  [facility]:  [phone]    1. Your final bill may vary from the amount quoted upon discharge if all procedures are not complete at that time, or if your doctor has additional procedures of which we are not aware. You will receive an additional bill if you return to the Emergency Department at [facility] for suture removal regardless of the facility of which the sutures were placed.     2. Please arrange for settlement of this account at the emergency registration.    3. All self-pay accounts are due in full at the time of treatment.  If you are unable to meet this obligation then payment is expected within 4-5 days.     4. If you have had radiology studies (CT, X-ray, Ultrasound, MRI), you have received a preliminary result during your emergency department visit. Please contact the radiology department [phone] to receive a copy of your final result. Please discuss the Final result with your primary physician or with the follow up physician provided.     Crisis Hotline:  [place] Crisis Hotline:  8-554-WJZEMSO or 1-379.577.5709  [place] Crisis Hotline:    1-825.529.4919 or 037-184-8680         ED Discharge Follow Up Questions    1. In order to provide you with very good care,  we would like to follow up with a phone call in the next few days.  May we have your permission to contact you?     YES /  NO    2. What is the best phone number to call you? (       )_____-__________    3. What is the best time to call you?      Morning  /  Afternoon  /  Evening                   Patient Signature:  ____________________________________________________________    Date:  ____________________________________________________________                Cephalexin Pregnancy And Lactation Text: This medication is Pregnancy Category B and considered safe during pregnancy.  It is also excreted in breast milk but can be used safely for shorter doses.

## 2023-08-11 ENCOUNTER — OFFICE VISIT (OUTPATIENT)
Dept: MEDICAL GROUP | Facility: LAB | Age: 43
End: 2023-08-11
Payer: COMMERCIAL

## 2023-08-11 VITALS
WEIGHT: 168 LBS | HEIGHT: 63 IN | DIASTOLIC BLOOD PRESSURE: 78 MMHG | RESPIRATION RATE: 16 BRPM | TEMPERATURE: 98.2 F | HEART RATE: 90 BPM | OXYGEN SATURATION: 99 % | BODY MASS INDEX: 29.77 KG/M2 | SYSTOLIC BLOOD PRESSURE: 120 MMHG

## 2023-08-11 DIAGNOSIS — E78.5 HYPERLIPIDEMIA, UNSPECIFIED HYPERLIPIDEMIA TYPE: ICD-10-CM

## 2023-08-11 DIAGNOSIS — E03.9 HYPOTHYROIDISM, UNSPECIFIED TYPE: ICD-10-CM

## 2023-08-11 DIAGNOSIS — F41.9 ANXIETY: ICD-10-CM

## 2023-08-11 DIAGNOSIS — Z76.89 ENCOUNTER TO ESTABLISH CARE WITH NEW DOCTOR: ICD-10-CM

## 2023-08-11 DIAGNOSIS — Z23 NEED FOR TDAP VACCINATION: ICD-10-CM

## 2023-08-11 PROCEDURE — 3074F SYST BP LT 130 MM HG: CPT | Performed by: FAMILY MEDICINE

## 2023-08-11 PROCEDURE — 99214 OFFICE O/P EST MOD 30 MIN: CPT | Mod: 25 | Performed by: FAMILY MEDICINE

## 2023-08-11 PROCEDURE — 3078F DIAST BP <80 MM HG: CPT | Performed by: FAMILY MEDICINE

## 2023-08-11 PROCEDURE — 90471 IMMUNIZATION ADMIN: CPT | Performed by: FAMILY MEDICINE

## 2023-08-11 PROCEDURE — 90715 TDAP VACCINE 7 YRS/> IM: CPT | Performed by: FAMILY MEDICINE

## 2023-08-11 RX ORDER — BUSPIRONE HYDROCHLORIDE 15 MG/1
1 TABLET ORAL 2 TIMES DAILY
COMMUNITY
End: 2023-08-29

## 2023-08-11 RX ORDER — ONDANSETRON 4 MG/1
4 TABLET, FILM COATED ORAL EVERY 8 HOURS PRN
COMMUNITY
Start: 2023-06-21 | End: 2024-03-05

## 2023-08-11 RX ORDER — ESTRADIOL 0.1 MG/D
FILM, EXTENDED RELEASE TRANSDERMAL
COMMUNITY
End: 2023-08-29

## 2023-08-11 RX ORDER — ALPRAZOLAM 0.5 MG/1
0.5 TABLET ORAL
Qty: 10 TABLET | Refills: 0 | Status: SHIPPED | OUTPATIENT
Start: 2023-08-11 | End: 2023-08-21

## 2023-08-11 RX ORDER — FUROSEMIDE 20 MG/1
TABLET ORAL
COMMUNITY
End: 2023-08-29

## 2023-08-11 RX ORDER — ALPRAZOLAM 2 MG/1
TABLET ORAL
COMMUNITY
End: 2023-08-11

## 2023-08-11 RX ORDER — ESCITALOPRAM OXALATE 10 MG/1
10 TABLET ORAL
COMMUNITY
Start: 2023-07-24 | End: 2023-08-11

## 2023-08-11 RX ORDER — ACYCLOVIR 800 MG/1
TABLET ORAL
COMMUNITY

## 2023-08-11 RX ORDER — LEVOTHYROXINE SODIUM 0.2 MG/1
TABLET ORAL
COMMUNITY
End: 2023-08-18

## 2023-08-11 RX ORDER — CIPROFLOXACIN 250 MG/1
TABLET, FILM COATED ORAL
COMMUNITY
Start: 2023-07-10 | End: 2023-08-29

## 2023-08-11 RX ORDER — FLUOXETINE HYDROCHLORIDE 20 MG/1
20 CAPSULE ORAL DAILY
Qty: 90 CAPSULE | Refills: 1 | Status: SHIPPED | OUTPATIENT
Start: 2023-08-11

## 2023-08-11 RX ORDER — PREGABALIN 225 MG/1
225 CAPSULE ORAL 2 TIMES DAILY
COMMUNITY
Start: 2023-08-01

## 2023-08-11 RX ORDER — HYDROCODONE BITARTRATE AND ACETAMINOPHEN 10; 325 MG/1; MG/1
TABLET ORAL
COMMUNITY
End: 2023-08-29

## 2023-08-11 RX ORDER — PHENTERMINE HYDROCHLORIDE 37.5 MG/1
TABLET ORAL
COMMUNITY
End: 2023-08-29

## 2023-08-11 RX ORDER — HYDROXYZINE PAMOATE 25 MG/1
CAPSULE ORAL
COMMUNITY
Start: 2023-07-31 | End: 2023-08-29

## 2023-08-11 ASSESSMENT — FIBROSIS 4 INDEX: FIB4 SCORE: 0.59
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
## 2023-08-16 ENCOUNTER — HOSPITAL ENCOUNTER (OUTPATIENT)
Dept: RADIOLOGY | Facility: MEDICAL CENTER | Age: 43
End: 2023-08-16
Attending: FAMILY MEDICINE
Payer: COMMERCIAL

## 2023-08-16 DIAGNOSIS — E78.5 HYPERLIPIDEMIA, UNSPECIFIED HYPERLIPIDEMIA TYPE: ICD-10-CM

## 2023-08-16 LAB — TSH SERPL DL<=0.005 MIU/L-ACNC: 0.33 UIU/ML (ref 0.45–4.5)

## 2023-08-16 PROCEDURE — 4410556 CT-CARDIAC SCORING (SELF PAY ONLY)

## 2023-08-17 DIAGNOSIS — E03.9 HYPOTHYROIDISM, UNSPECIFIED TYPE: ICD-10-CM

## 2023-08-17 RX ORDER — LEVOTHYROXINE SODIUM 175 UG/1
175 TABLET ORAL
Qty: 30 TABLET | Refills: 3 | Status: SHIPPED | OUTPATIENT
Start: 2023-08-17 | End: 2023-08-18

## 2023-08-18 RX ORDER — LEVOTHYROXINE SODIUM 0.1 MG/1
100 TABLET ORAL
Qty: 90 TABLET | Refills: 0 | Status: SHIPPED | OUTPATIENT
Start: 2023-08-18 | End: 2023-08-29

## 2023-08-22 ENCOUNTER — APPOINTMENT (OUTPATIENT)
Dept: RADIOLOGY | Facility: MEDICAL CENTER | Age: 43
End: 2023-08-22
Attending: PHYSICAL MEDICINE & REHABILITATION
Payer: COMMERCIAL

## 2023-08-22 DIAGNOSIS — M54.16 RADICULOPATHY, LUMBAR REGION: ICD-10-CM

## 2023-08-22 PROCEDURE — 72148 MRI LUMBAR SPINE W/O DYE: CPT

## 2023-08-24 DIAGNOSIS — Z13.220 SCREENING FOR HYPERLIPIDEMIA: ICD-10-CM

## 2023-08-24 DIAGNOSIS — E78.5 HYPERLIPIDEMIA, UNSPECIFIED HYPERLIPIDEMIA TYPE: ICD-10-CM

## 2023-08-24 RX ORDER — ROSUVASTATIN CALCIUM 10 MG/1
10 TABLET, COATED ORAL EVERY EVENING
Qty: 30 TABLET | Refills: 11 | Status: SHIPPED | OUTPATIENT
Start: 2023-08-24 | End: 2023-09-26

## 2023-08-29 ENCOUNTER — OFFICE VISIT (OUTPATIENT)
Dept: MEDICAL GROUP | Facility: LAB | Age: 43
End: 2023-08-29
Payer: COMMERCIAL

## 2023-08-29 VITALS
WEIGHT: 174 LBS | RESPIRATION RATE: 16 BRPM | HEIGHT: 63 IN | OXYGEN SATURATION: 99 % | SYSTOLIC BLOOD PRESSURE: 108 MMHG | TEMPERATURE: 97.4 F | HEART RATE: 82 BPM | DIASTOLIC BLOOD PRESSURE: 70 MMHG | BODY MASS INDEX: 30.83 KG/M2

## 2023-08-29 DIAGNOSIS — E78.5 HYPERLIPIDEMIA, UNSPECIFIED HYPERLIPIDEMIA TYPE: ICD-10-CM

## 2023-08-29 DIAGNOSIS — F41.9 ANXIETY: ICD-10-CM

## 2023-08-29 DIAGNOSIS — E03.9 HYPOTHYROIDISM, UNSPECIFIED TYPE: ICD-10-CM

## 2023-08-29 DIAGNOSIS — R94.4 DECREASED GFR: ICD-10-CM

## 2023-08-29 PROCEDURE — 3074F SYST BP LT 130 MM HG: CPT | Performed by: FAMILY MEDICINE

## 2023-08-29 PROCEDURE — 99214 OFFICE O/P EST MOD 30 MIN: CPT | Performed by: FAMILY MEDICINE

## 2023-08-29 PROCEDURE — 3078F DIAST BP <80 MM HG: CPT | Performed by: FAMILY MEDICINE

## 2023-08-29 RX ORDER — FLUOXETINE HYDROCHLORIDE 40 MG/1
40 CAPSULE ORAL DAILY
Qty: 90 CAPSULE | Refills: 1 | Status: SHIPPED | OUTPATIENT
Start: 2023-08-29 | End: 2024-02-27

## 2023-08-29 ASSESSMENT — FIBROSIS 4 INDEX: FIB4 SCORE: 0.59

## 2023-08-30 NOTE — PROGRESS NOTES
Chief Complaint:   Chief Complaint   Patient presents with    Follow-Up       HPI: Established patient  Kathleen Alvarado is a 43 y.o. female who presents for follow-up, discussed the following today after reviewing her lab work results    1. Hypothyroidism, unspecified type  Patient thyroid function testing indicates hypothyroidism dose was reduced to 100 mcg daily, positive symptoms and anxiety.  No palpitations or other concerns    2. Hyperlipidemia, unspecified hyperlipidemia type  History of hyperlipidemia.  Patient is on Crestor 10 mg tolerating medication well    3. Anxiety  Chronic, not well controlled.  Recently started on Prozac 20 mg, patient said her symptoms are still not well controlled, discussed other cofactor which is hyperactive thyroid as described above.  Denies side effects from the medication    4. Decreased GFR    History of decreased GFR, discussed with the patient before next visit to recheck her kidney function test, asymptomatic at this time.        Past medical history, family history, social history and medications reviewed and updated in the record.   Current medications, problem list and allergies reviewed in [facility]  [employer] maintenance topics are reviewed and updated.    Patient Active Problem List    Diagnosis Date Noted    Encounter to establish care with new doctor 08/11/2023    Ankle instability, right 03/02/2023    Screening for breast cancer 09/13/2022    Melena 05/04/2022    Postural dizziness with presyncope 05/04/2022    Abdominal pain with vomiting and history of abdominal surgery 02/01/2022    Generalized anxiety disorder 01/25/2022    Hepatic lesion 01/25/2022    Post concussion syndrome 01/05/2022    Insomnia due to medical condition 01/05/2022    Fatigue 10/25/2021    Migraine 10/25/2021    Hormone replacement therapy 10/25/2021    History of TIA (transient ischemic attack) 10/25/2021    Depression 06/15/2021    Gastroparesis 05/18/2021    Vitamin D deficiency 05/18/2021     Abdominal pain 03/18/2018    Narcotic dependence (HCC) 12/25/2015    Chronic back pain 07/21/2011    Seasonal allergies 07/21/2011    Acne 07/21/2011    Hypothyroidism 07/21/2011     Family History   Problem Relation Age of Onset    Lung Disease Mother     Stroke Father     Arthritis Father         gout    Heart Disease Paternal Grandfather     Hypertension Other     Stroke Other      Social History     Socioeconomic History    Marital status: Single     Spouse name: Not on file    Number of children: Not on file    Years of education: Not on file    Highest education level: Not on file   Occupational History     Comment:    Tobacco Use    Smoking status: Never    Smokeless tobacco: Never    Tobacco comments:     exposed to secondhand smoke in childhood   Vaping Use    Vaping Use: Never used   Substance and Sexual Activity    Alcohol use: Not Currently     Alcohol/week: 1.8 oz     Types: 3 Glasses of wine per week    Drug use: No    Sexual activity: Yes     Partners: Male   Other Topics Concern    Not on file   Social History Narrative    Not on file     Social Determinants of Health     Financial Resource Strain: Not on file   Food Insecurity: Not on file   Transportation Needs: Not on file   Physical Activity: Not on file   Stress: Not on file   Social Connections: Not on file   Intimate Partner Violence: Not on file   Housing Stability: Not on file     Current Outpatient Medications   Medication Sig Dispense Refill    fluoxetine (PROZAC) 40 MG capsule Take 1 Capsule by mouth every day. 90 Capsule 1    rosuvastatin (CRESTOR) 10 MG Tab Take 1 Tablet by mouth every evening. 30 Tablet 11    pregabalin (LYRICA) 225 MG capsule Take 225 mg by mouth 2 times a day.      ondansetron (ZOFRAN) 4 MG Tab tablet Take 4 mg by mouth every 8 hours as needed.      acyclovir (ZOVIRAX) 800 MG Tab TK 1 T PO BID FOR 10 DAYS      FLUoxetine (PROZAC) 20 MG Cap Take 1 Capsule by mouth every day. 90 Capsule 1    metoclopramide  "(REGLAN) 10 MG Tab Take 10 mg by mouth 3 times a day.      acetaminophen (TYLENOL) 500 MG Tab Take 2 tabs up to 3 times a day prn pain 60 Tablet 0    methylPREDNISolone (MEDROL DOSEPAK) 4 MG Tablet Therapy Pack Follow schedule on package instructions. 21 Tablet 0    vitamin D (CHOLECALCIFEROL) 1000 Unit Tab TAKE 1 TABLET BY MOUTH EVERY DAY 90 Tablet 1    ondansetron (ZOFRAN ODT) 4 MG TABLET DISPERSIBLE Take 1 Tablet by mouth every 6 hours as needed for Nausea. 5 Tablet 0    levothyroxine (SYNTHROID) 100 MCG Tab TAKE 1 TABLET BY MOUTH EVERY DAY 90 Tablet 1    cyclobenzaprine (FLEXERIL) 10 mg Tab       pregabalin (LYRICA) 150 MG Cap        No current facility-administered medications for this visit.           Review Of Systems  As documented in HPI above  PHYSICAL EXAMINATION:    /70 (BP Location: Right arm, Patient Position: Sitting, BP Cuff Size: Adult)   Pulse 82   Temp 36.3 °C (97.4 °F) (Temporal)   Resp 16   Ht 1.6 m (5' 3\")   Wt 78.9 kg (174 lb)   LMP 12/01/2011 (Approximate)   SpO2 99%   BMI 30.82 kg/m²   Gen.: Well-developed, well-nourished, no apparent distress, pleasant and cooperative with the examination  HEENT: Normocephalic/atraumatic,   Cor: Regular rate and rhythm without murmur gallop or rub  Lungs: Clear to auscultation with equal breath sounds bilaterally. No wheezes, rhonchi.  Abdomen: Soft nontender without hepatosplenomegaly or masses appreciated, normoactive bowel sounds  Extremities: No cyanosis, clubbing or edema       ASSESSMENT/Plan:  1. Hypothyroidism, unspecified type  Chronic not well controlled, reduce the dose of her Synthroid since TSH is suppressed, and advised to recheck thyroid function test in 6 to 8 weeks.  Follow-up as directed  TSH WITH REFLEX TO FT4      2. Hyperlipidemia, unspecified hyperlipidemia type  Chronic stable continue current dose of Crestor      3. Anxiety  Chronic not well controlled, increase Prozac to 40 mg follow-up with therapist.  Recheck thyroid " function after dose adjustment    Fluoxetine (PROZAC) 40 MG capsule    TSH WITH REFLEX TO FT4      4. Decreased GFR  Chronic recheck kidney function test before next visit, avoid NSAIDs and continue with hydration  Comp Metabolic Panel         Please note that this dictation was created using voice recognition software. I have made every reasonable attempt to correct obvious errors but there may be errors of grammar and content that I may have overlooked prior to finalization of this note.

## 2023-09-23 DIAGNOSIS — E78.5 HYPERLIPIDEMIA, UNSPECIFIED HYPERLIPIDEMIA TYPE: ICD-10-CM

## 2023-09-26 RX ORDER — ROSUVASTATIN CALCIUM 10 MG/1
10 TABLET, COATED ORAL EVERY EVENING
Qty: 30 TABLET | Refills: 11 | Status: SHIPPED | OUTPATIENT
Start: 2023-09-26 | End: 2024-03-05

## 2023-10-10 ENCOUNTER — APPOINTMENT (OUTPATIENT)
Dept: MEDICAL GROUP | Facility: LAB | Age: 43
End: 2023-10-10
Payer: COMMERCIAL

## 2023-10-12 ENCOUNTER — OFFICE VISIT (OUTPATIENT)
Dept: MEDICAL GROUP | Facility: LAB | Age: 43
End: 2023-10-12
Payer: COMMERCIAL

## 2023-10-12 VITALS
HEIGHT: 63 IN | HEART RATE: 72 BPM | WEIGHT: 181 LBS | RESPIRATION RATE: 16 BRPM | DIASTOLIC BLOOD PRESSURE: 80 MMHG | SYSTOLIC BLOOD PRESSURE: 108 MMHG | BODY MASS INDEX: 32.07 KG/M2 | TEMPERATURE: 98.2 F | OXYGEN SATURATION: 98 %

## 2023-10-12 DIAGNOSIS — M79.661 RIGHT CALF PAIN: ICD-10-CM

## 2023-10-12 DIAGNOSIS — R94.4 DECREASED GFR: ICD-10-CM

## 2023-10-12 DIAGNOSIS — E78.5 DYSLIPIDEMIA: ICD-10-CM

## 2023-10-12 DIAGNOSIS — R07.89 CHEST DISCOMFORT: ICD-10-CM

## 2023-10-12 DIAGNOSIS — E03.9 HYPOTHYROIDISM, UNSPECIFIED TYPE: ICD-10-CM

## 2023-10-12 PROCEDURE — 3074F SYST BP LT 130 MM HG: CPT | Performed by: FAMILY MEDICINE

## 2023-10-12 PROCEDURE — 3079F DIAST BP 80-89 MM HG: CPT | Performed by: FAMILY MEDICINE

## 2023-10-12 PROCEDURE — 99214 OFFICE O/P EST MOD 30 MIN: CPT | Performed by: FAMILY MEDICINE

## 2023-10-12 RX ORDER — ESCITALOPRAM OXALATE 10 MG/1
10 TABLET ORAL
COMMUNITY
Start: 2023-09-21 | End: 2024-03-05

## 2023-10-12 RX ORDER — LEVOTHYROXINE SODIUM 0.12 MG/1
TABLET ORAL
COMMUNITY
Start: 2023-09-29 | End: 2023-10-18

## 2023-10-12 RX ORDER — HYDROXYZINE PAMOATE 50 MG/1
CAPSULE ORAL
COMMUNITY
Start: 2023-09-29

## 2023-10-12 RX ORDER — HYDROXYZINE PAMOATE 25 MG/1
CAPSULE ORAL
COMMUNITY
Start: 2023-09-25

## 2023-10-12 ASSESSMENT — FIBROSIS 4 INDEX: FIB4 SCORE: 0.59

## 2023-10-13 ENCOUNTER — APPOINTMENT (OUTPATIENT)
Dept: RADIOLOGY | Facility: MEDICAL CENTER | Age: 43
End: 2023-10-13
Attending: FAMILY MEDICINE
Payer: COMMERCIAL

## 2023-10-13 NOTE — PROGRESS NOTES
Chief Complaint:   Chief Complaint   Patient presents with    Pain     Right calf aches    Follow-Up       HPI: Established patient  Kathleen Alvarado is a 43 y.o. female who presents for follow-up, discussed the following today:    1. Right calf pain  Patient reports pain on the right calf started around 2 weeks ago, she thought it might be related to statin so she stopped the medication, muscular pain resolved but she still having the pain deep in the calf muscle.  Reports also mild chest discomfort and mild shortness of breath, vital signs are stable    2. Chest discomfort  Reports mild chest discomfort and mild shortness of breath and calf pain for the past few weeks.  Vital signs are stable in the office today.    3. Hypothyroidism, unspecified type  Taking thyroid medication, was supposed to recheck her thyroid function, no lab results available  Reports anxiety improved  4. Decreased GFR  Previous history of decreased GFR, patient wanted to discuss possibly starting on water pills like Lasix to reduce water retention related to gabapentin use.  Discussed with the patient that I need to check her kidney function before discussing any type of medication at this time.    5. Dyslipidemia  Chronic, stopped statins for muscular pain as described above        Past medical history, family history, social history and medications reviewed and updated in the record.   Current medications, problem list and allergies reviewed in [facility]  [employer] maintenance topics are reviewed and updated.    Patient Active Problem List    Diagnosis Date Noted    Encounter to establish care with new doctor 08/11/2023    Ankle instability, right 03/02/2023    Screening for breast cancer 09/13/2022    Melena 05/04/2022    Postural dizziness with presyncope 05/04/2022    Abdominal pain with vomiting and history of abdominal surgery 02/01/2022    Generalized anxiety disorder 01/25/2022    Hepatic lesion 01/25/2022    Post concussion syndrome  01/05/2022    Insomnia due to medical condition 01/05/2022    Fatigue 10/25/2021    Migraine 10/25/2021    Hormone replacement therapy 10/25/2021    History of TIA (transient ischemic attack) 10/25/2021    Depression 06/15/2021    Gastroparesis 05/18/2021    Vitamin D deficiency 05/18/2021    Abdominal pain 03/18/2018    Narcotic dependence (HCC) 12/25/2015    Chronic back pain 07/21/2011    Seasonal allergies 07/21/2011    Acne 07/21/2011    Hypothyroidism 07/21/2011     Family History   Problem Relation Age of Onset    Lung Disease Mother     Stroke Father     Arthritis Father         gout    Heart Disease Paternal Grandfather     Hypertension Other     Stroke Other      Social History     Socioeconomic History    Marital status: Single     Spouse name: Not on file    Number of children: Not on file    Years of education: Not on file    Highest education level: Not on file   Occupational History     Comment:    Tobacco Use    Smoking status: Never    Smokeless tobacco: Never    Tobacco comments:     exposed to secondhand smoke in childhood   Vaping Use    Vaping Use: Never used   Substance and Sexual Activity    Alcohol use: Not Currently     Alcohol/week: 1.8 oz     Types: 3 Glasses of wine per week    Drug use: No    Sexual activity: Yes     Partners: Male   Other Topics Concern    Not on file   Social History Narrative    Not on file     Social Determinants of Health     Financial Resource Strain: Not on file   Food Insecurity: Not on file   Transportation Needs: Not on file   Physical Activity: Not on file   Stress: Not on file   Social Connections: Not on file   Intimate Partner Violence: Not on file   Housing Stability: Not on file       Current Outpatient Medications   Medication Sig Dispense Refill    rosuvastatin (CRESTOR) 10 MG Tab TAKE 1 TABLET BY MOUTH EVERY DAY IN THE EVENING 30 Tablet 11    escitalopram (LEXAPRO) 10 MG Tab Take 10 mg by mouth at bedtime.      hydrOXYzine pamoate  "(VISTARIL) 25 MG Cap TAKE 1 CAPSULE BY MOUTH DAILY AT BEDTIME AS NEEDED FOR ANXIETY.      hydrOXYzine pamoate (VISTARIL) 50 MG Cap TAKE 1 CAPSULE BY MOUTH 3 TIMES A DAY AS NEEDED FOR ITCHING OR OTHER (NAUSEA, ANXIETY, INSOMNIA).      levothyroxine (SYNTHROID) 125 MCG Tab TAKE 1 TABLET BY MOUTH DAILY IN THE MORNING ON AN EMPTY STOMACH      fluoxetine (PROZAC) 40 MG capsule Take 1 Capsule by mouth every day. 90 Capsule 1    pregabalin (LYRICA) 225 MG capsule Take 225 mg by mouth 2 times a day.      ondansetron (ZOFRAN) 4 MG Tab tablet Take 4 mg by mouth every 8 hours as needed.      acyclovir (ZOVIRAX) 800 MG Tab TK 1 T PO BID FOR 10 DAYS      FLUoxetine (PROZAC) 20 MG Cap Take 1 Capsule by mouth every day. 90 Capsule 1    metoclopramide (REGLAN) 10 MG Tab Take 10 mg by mouth 3 times a day.      acetaminophen (TYLENOL) 500 MG Tab Take 2 tabs up to 3 times a day prn pain 60 Tablet 0    methylPREDNISolone (MEDROL DOSEPAK) 4 MG Tablet Therapy Pack Follow schedule on package instructions. 21 Tablet 0    vitamin D (CHOLECALCIFEROL) 1000 Unit Tab TAKE 1 TABLET BY MOUTH EVERY DAY 90 Tablet 1    ondansetron (ZOFRAN ODT) 4 MG TABLET DISPERSIBLE Take 1 Tablet by mouth every 6 hours as needed for Nausea. 5 Tablet 0    levothyroxine (SYNTHROID) 100 MCG Tab TAKE 1 TABLET BY MOUTH EVERY DAY 90 Tablet 1    cyclobenzaprine (FLEXERIL) 10 mg Tab       pregabalin (LYRICA) 150 MG Cap        No current facility-administered medications for this visit.        Review Of Systems  As documented in HPI above  PHYSICAL EXAMINATION:    /80 (BP Location: Left arm, Patient Position: Sitting, BP Cuff Size: Adult)   Pulse 72   Temp 36.8 °C (98.2 °F) (Temporal)   Resp 16   Ht 1.6 m (5' 3\")   Wt 82.1 kg (181 lb)   LMP 12/01/2011 (Approximate)   SpO2 98%   BMI 32.06 kg/m²   Gen.: Well-developed, well-nourished, no apparent distress, pleasant and cooperative with the examination  HEENT: Normocephalic/atraumatic,   Neck: No JVD or bruits, " no adenopathy  Cor: Regular rate and rhythm without murmur gallop or rub  Lungs: Clear to auscultation with equal breath sounds bilaterally. No wheezes, rhonchi.  Abdomen: Soft nontender without hepatosplenomegaly or masses appreciated, normoactive bowel sounds  Extremities: No cyanosis, clubbing or edema       ASSESSMENT/Plan:  1. Right calf pain  New concern, rule out DVT.  Stat order for ultrasound placed today.  US-EXTREMITY VENOUS LOWER UNILAT RIGHT    CT-CTA CHEST PULMONARY ARTERY W/ RECONS    US-EXTREMITY VENOUS LOWER UNILAT RIGHT    CANCELED: CT-CHEST (THORAX) WITH      2. Chest discomfort  New concern, rule out DVT or PE related to DVT.  Labs and CT ordered as stat.  Follow-up as directed, if any worsening of symptoms to report to ER  CBC WITH DIFFERENTIAL    Comp Metabolic Panel    CT-CTA CHEST PULMONARY ARTERY W/ RECONS    CANCELED: CT-CHEST (THORAX) WITH      3. Hypothyroidism, unspecified type  Chronic, continue current dose and recheck labs as directed  TSH WITH REFLEX TO FT4      4. Decreased GFR  Recheck kidney function test.      5. Dyslipidemia  Patient stopped statins for muscular pain.  Recheck lipid profile  Lipid Profile         Please note that this dictation was created using voice recognition software. I have worked with consultants from the vendor as well as technical experts from [employer] to optimize the interface. I have made every reasonable attempt to correct obvious errors, but I expect that there are errors of grammar and possibly content that I did not discover before finalizing the note.

## 2023-10-17 ENCOUNTER — TELEPHONE (OUTPATIENT)
Dept: MEDICAL GROUP | Facility: LAB | Age: 43
End: 2023-10-17
Payer: COMMERCIAL

## 2023-10-17 DIAGNOSIS — F41.9 ANXIETY: ICD-10-CM

## 2023-10-17 RX ORDER — ALPRAZOLAM 0.5 MG/1
0.5 TABLET ORAL NIGHTLY PRN
Qty: 1 TABLET | Refills: 0 | Status: SHIPPED | OUTPATIENT
Start: 2023-10-17 | End: 2023-10-18

## 2023-10-17 NOTE — TELEPHONE ENCOUNTER
Received a call from [employer].  The patient is requesting a one-time prescription for oral sedation to take for her CT scan.  Please advise.

## 2023-10-18 DIAGNOSIS — E03.9 HYPOTHYROIDISM, UNSPECIFIED TYPE: ICD-10-CM

## 2023-10-18 DIAGNOSIS — E78.5 DYSLIPIDEMIA: ICD-10-CM

## 2023-10-18 LAB
ALBUMIN SERPL-MCNC: 4.7 G/DL (ref 3.9–4.9)
ALBUMIN/GLOB SERPL: 2 {RATIO} (ref 1.2–2.2)
ALP SERPL-CCNC: 92 IU/L (ref 44–121)
ALT SERPL-CCNC: 52 IU/L (ref 0–32)
AST SERPL-CCNC: 24 IU/L (ref 0–40)
BASOPHILS # BLD AUTO: 0.1 X10E3/UL (ref 0–0.2)
BASOPHILS NFR BLD AUTO: 1 %
BILIRUB SERPL-MCNC: <0.2 MG/DL (ref 0–1.2)
BUN SERPL-MCNC: 13 MG/DL (ref 6–24)
BUN/CREAT SERPL: 14 (ref 9–23)
CALCIUM SERPL-MCNC: 9.7 MG/DL (ref 8.7–10.2)
CHLORIDE SERPL-SCNC: 101 MMOL/L (ref 96–106)
CHOLEST SERPL-MCNC: 290 MG/DL (ref 100–199)
CO2 SERPL-SCNC: 21 MMOL/L (ref 20–29)
CREAT SERPL-MCNC: 0.94 MG/DL (ref 0.57–1)
EGFRCR SERPLBLD CKD-EPI 2021: 78 ML/MIN/1.73
EOSINOPHIL # BLD AUTO: 0.3 X10E3/UL (ref 0–0.4)
EOSINOPHIL NFR BLD AUTO: 4 %
ERYTHROCYTE [DISTWIDTH] IN BLOOD BY AUTOMATED COUNT: 12.7 % (ref 11.7–15.4)
GLOBULIN SER CALC-MCNC: 2.3 G/DL (ref 1.5–4.5)
GLUCOSE SERPL-MCNC: 90 MG/DL (ref 70–99)
HCT VFR BLD AUTO: 42 % (ref 34–46.6)
HDLC SERPL-MCNC: 65 MG/DL
HGB BLD-MCNC: 14 G/DL (ref 11.1–15.9)
IMM GRANULOCYTES # BLD AUTO: 0.1 X10E3/UL (ref 0–0.1)
IMM GRANULOCYTES NFR BLD AUTO: 1 %
IMMATURE CELLS  115398: NORMAL
LABORATORY COMMENT REPORT: ABNORMAL
LDLC SERPL CALC-MCNC: 168 MG/DL (ref 0–99)
LYMPHOCYTES # BLD AUTO: 2.4 X10E3/UL (ref 0.7–3.1)
LYMPHOCYTES NFR BLD AUTO: 31 %
MCH RBC QN AUTO: 32 PG (ref 26.6–33)
MCHC RBC AUTO-ENTMCNC: 33.3 G/DL (ref 31.5–35.7)
MCV RBC AUTO: 96 FL (ref 79–97)
MONOCYTES # BLD AUTO: 0.6 X10E3/UL (ref 0.1–0.9)
MONOCYTES NFR BLD AUTO: 8 %
MORPHOLOGY BLD-IMP: NORMAL
NEUTROPHILS # BLD AUTO: 4.4 X10E3/UL (ref 1.4–7)
NEUTROPHILS NFR BLD AUTO: 55 %
NRBC BLD AUTO-RTO: NORMAL %
PLATELET # BLD AUTO: 336 X10E3/UL (ref 150–450)
POTASSIUM SERPL-SCNC: 4.5 MMOL/L (ref 3.5–5.2)
PROT SERPL-MCNC: 7 G/DL (ref 6–8.5)
RBC # BLD AUTO: 4.38 X10E6/UL (ref 3.77–5.28)
SODIUM SERPL-SCNC: 136 MMOL/L (ref 134–144)
TRIGL SERPL-MCNC: 305 MG/DL (ref 0–149)
TSH SERPL DL<=0.005 MIU/L-ACNC: 0.1 UIU/ML (ref 0.45–4.5)
VLDLC SERPL CALC-MCNC: 57 MG/DL (ref 5–40)
WBC # BLD AUTO: 7.9 X10E3/UL (ref 3.4–10.8)

## 2023-10-18 RX ORDER — LEVOTHYROXINE SODIUM 0.1 MG/1
100 TABLET ORAL
Qty: 90 TABLET | Refills: 1 | Status: SHIPPED | OUTPATIENT
Start: 2023-10-18

## 2023-10-18 RX ORDER — ROSUVASTATIN CALCIUM 20 MG/1
20 TABLET, COATED ORAL EVERY EVENING
Qty: 30 TABLET | Refills: 11 | Status: SHIPPED | OUTPATIENT
Start: 2023-10-18 | End: 2023-11-14

## 2023-10-18 RX ORDER — LEVOTHYROXINE SODIUM 88 UG/1
88 TABLET ORAL
Qty: 90 TABLET | Refills: 1 | Status: SHIPPED | OUTPATIENT
Start: 2023-10-18 | End: 2023-10-18

## 2023-10-23 ENCOUNTER — OFFICE VISIT (OUTPATIENT)
Dept: URGENT CARE | Facility: CLINIC | Age: 43
End: 2023-10-23
Payer: COMMERCIAL

## 2023-10-23 ENCOUNTER — APPOINTMENT (OUTPATIENT)
Dept: RADIOLOGY | Facility: IMAGING CENTER | Age: 43
End: 2023-10-23
Attending: NURSE PRACTITIONER
Payer: COMMERCIAL

## 2023-10-23 VITALS
RESPIRATION RATE: 18 BRPM | OXYGEN SATURATION: 98 % | DIASTOLIC BLOOD PRESSURE: 60 MMHG | HEIGHT: 62 IN | SYSTOLIC BLOOD PRESSURE: 102 MMHG | HEART RATE: 100 BPM | WEIGHT: 165 LBS | TEMPERATURE: 97.4 F | BODY MASS INDEX: 30.36 KG/M2

## 2023-10-23 DIAGNOSIS — J06.9 VIRAL URI: ICD-10-CM

## 2023-10-23 DIAGNOSIS — J02.9 PHARYNGITIS, UNSPECIFIED ETIOLOGY: ICD-10-CM

## 2023-10-23 DIAGNOSIS — R09.81 NASAL CONGESTION: ICD-10-CM

## 2023-10-23 DIAGNOSIS — R05.1 ACUTE COUGH: ICD-10-CM

## 2023-10-23 LAB
FLUAV RNA SPEC QL NAA+PROBE: NEGATIVE
FLUBV RNA SPEC QL NAA+PROBE: NEGATIVE
RSV RNA SPEC QL NAA+PROBE: NEGATIVE
S PYO DNA SPEC NAA+PROBE: NOT DETECTED
SARS-COV-2 RNA RESP QL NAA+PROBE: NEGATIVE

## 2023-10-23 PROCEDURE — 99214 OFFICE O/P EST MOD 30 MIN: CPT | Performed by: NURSE PRACTITIONER

## 2023-10-23 PROCEDURE — 3078F DIAST BP <80 MM HG: CPT | Performed by: NURSE PRACTITIONER

## 2023-10-23 PROCEDURE — 87651 STREP A DNA AMP PROBE: CPT | Performed by: NURSE PRACTITIONER

## 2023-10-23 PROCEDURE — 0241U POCT CEPHEID COV-2, FLU A/B, RSV - PCR: CPT | Performed by: NURSE PRACTITIONER

## 2023-10-23 PROCEDURE — 71046 X-RAY EXAM CHEST 2 VIEWS: CPT | Mod: TC | Performed by: NURSE PRACTITIONER

## 2023-10-23 PROCEDURE — 3074F SYST BP LT 130 MM HG: CPT | Performed by: NURSE PRACTITIONER

## 2023-10-23 RX ORDER — LIDOCAINE HYDROCHLORIDE 20 MG/ML
15 SOLUTION OROPHARYNGEAL
Qty: 100 ML | Refills: 0 | Status: SHIPPED | OUTPATIENT
Start: 2023-10-23 | End: 2024-03-05

## 2023-10-23 RX ORDER — DEXTROMETHORPHAN HYDROBROMIDE AND PROMETHAZINE HYDROCHLORIDE 15; 6.25 MG/5ML; MG/5ML
5 SYRUP ORAL EVERY 4 HOURS PRN
Qty: 120 ML | Refills: 0 | Status: SHIPPED | OUTPATIENT
Start: 2023-10-23 | End: 2024-03-05

## 2023-10-23 ASSESSMENT — VISUAL ACUITY: OU: 1

## 2023-10-23 ASSESSMENT — ENCOUNTER SYMPTOMS
SHORTNESS OF BREATH: 1
FEVER: 1
SORE THROAT: 1
COUGH: 1

## 2023-10-23 ASSESSMENT — FIBROSIS 4 INDEX: FIB4 SCORE: 0.41

## 2023-10-23 NOTE — PROGRESS NOTES
Subjective:     Kathleen Alvarado is a 43 y.o. female who presents for Fever, Nasal Congestion, Shortness of Breath (X3 days), and Pharyngitis       Fever   This is a new problem. Episode onset: Friday. The problem has been gradually worsening. Associated symptoms include congestion (Green discharge), coughing and a sore throat.   Shortness of Breath  This is a new problem. The problem has been gradually worsening. Associated symptoms include a fever and a sore throat.   Pharyngitis   This is a new problem. The problem has been gradually worsening. Associated symptoms include congestion (Green discharge), coughing and shortness of breath. She has tried NSAIDs for the symptoms. The treatment provided no relief.     Review of Systems   Constitutional:  Positive for fever and malaise/fatigue.   HENT:  Positive for congestion (Green discharge) and sore throat.    Respiratory:  Positive for cough and shortness of breath.    All other systems reviewed and are negative.    Refer to HPI for additional details.    During this visit, appropriate PPE was worn, and hand hygiene was performed.    PMH:  has a past medical history of Allergy, Anesthesia, Anxiety, Arthritis, Bowel habit changes, Cold (05/03/2019), High cholesterol (08/2018), History of anemia, endometriosis, Migraine, Pain (05/2019), Pneumonia (12/2018), Polycystic ovaries, PONV (postoperative nausea and vomiting), Psychiatric problem, Seizure ([facility]) (06/2017), Stroke ([facility]) (08/2018), and Thyroid condition.    MEDS:   Current Outpatient Medications:     promethazine-dextromethorphan (PROMETHAZINE-DM) 6.25-15 MG/5ML syrup, Take 5 mL by mouth every four hours as needed for Cough., Disp: 120 mL, Rfl: 0    lidocaine (XYLOCAINE) 2 % Solution, Take 15 mL by mouth every 3 hours as needed for Throat/Mouth Pain. Swish/gargle well, then spit out, Disp: 100 mL, Rfl: 0    rosuvastatin (CRESTOR) 20 MG Tab, Take 1 Tablet by mouth every evening., Disp: 30 Tablet, Rfl: 11     levothyroxine (SYNTHROID) 100 MCG Tab, Take 1 Tablet by mouth every morning on an empty stomach., Disp: 90 Tablet, Rfl: 1    escitalopram (LEXAPRO) 10 MG Tab, Take 10 mg by mouth at bedtime., Disp: , Rfl:     hydrOXYzine pamoate (VISTARIL) 25 MG Cap, TAKE 1 CAPSULE BY MOUTH DAILY AT BEDTIME AS NEEDED FOR ANXIETY., Disp: , Rfl:     hydrOXYzine pamoate (VISTARIL) 50 MG Cap, TAKE 1 CAPSULE BY MOUTH 3 TIMES A DAY AS NEEDED FOR ITCHING OR OTHER (NAUSEA, ANXIETY, INSOMNIA)., Disp: , Rfl:     rosuvastatin (CRESTOR) 10 MG Tab, TAKE 1 TABLET BY MOUTH EVERY DAY IN THE EVENING, Disp: 30 Tablet, Rfl: 11    fluoxetine (PROZAC) 40 MG capsule, Take 1 Capsule by mouth every day., Disp: 90 Capsule, Rfl: 1    pregabalin (LYRICA) 225 MG capsule, Take 225 mg by mouth 2 times a day., Disp: , Rfl:     ondansetron (ZOFRAN) 4 MG Tab tablet, Take 4 mg by mouth every 8 hours as needed., Disp: , Rfl:     acyclovir (ZOVIRAX) 800 MG Tab, TK 1 T PO BID FOR 10 DAYS, Disp: , Rfl:     FLUoxetine (PROZAC) 20 MG Cap, Take 1 Capsule by mouth every day., Disp: 90 Capsule, Rfl: 1    metoclopramide (REGLAN) 10 MG Tab, Take 10 mg by mouth 3 times a day., Disp: , Rfl:     acetaminophen (TYLENOL) 500 MG Tab, Take 2 tabs up to 3 times a day prn pain, Disp: 60 Tablet, Rfl: 0    methylPREDNISolone (MEDROL DOSEPAK) 4 MG Tablet Therapy Pack, Follow schedule on package instructions., Disp: 21 Tablet, Rfl: 0    vitamin D (CHOLECALCIFEROL) 1000 Unit Tab, TAKE 1 TABLET BY MOUTH EVERY DAY, Disp: 90 Tablet, Rfl: 1    ondansetron (ZOFRAN ODT) 4 MG TABLET DISPERSIBLE, Take 1 Tablet by mouth every 6 hours as needed for Nausea., Disp: 5 Tablet, Rfl: 0    cyclobenzaprine (FLEXERIL) 10 mg Tab, , Disp: , Rfl:     pregabalin (LYRICA) 150 MG Cap, , Disp: , Rfl:     ALLERGIES:   Allergies   Allergen Reactions    Clindamycin Myalgia     Joints swell    Morphine Swelling     Swelling; redness    Norco [Apap-Fd&C Blue #1-Hydrocodone] Rash     Trouble breathing    Tramadol  "Palpitations     palpitations    Demerol Rash     Rash    Latex Itching     Itch, rash    Other Misc Itching     \"All Metals\" Blisters    Sulfa Drugs Hives, Rash and Anaphylaxis     Also feel \"high\"    Tape Rash     Paper tape ok for IVs    Trace Metals Hives    Levaquin      Other reaction(s): Facial swelling  Other reaction(s): Facial swelling    Seasonal Unspecified    Triamcinolone Unspecified    Trazodone Hives     Hives     SURGHX:   Past Surgical History:   Procedure Laterality Date    PB REPAIR COLLAT ANKLE LIGMNT,SECONDARY Right 5/17/2023    Procedure: RIGHT ANKLE ARTHROSCOPY, RIGHT MEDIAL AND LATERAL LIGAMENT RECONSTRUCTION;  Surgeon: García Wallace M.D.;  Location: [facility];  Service: Orthopedics    PB RELEASE TIB/FIB/ANKLE FLEX TENDON,EA Right 5/17/2023    Procedure: EVALUATE RIGHT PERONEAL TENDON;  Surgeon: García Wallace M.D.;  Location: [facility];  Service: Orthopedics    PB PART REMV TALUS OR CALCANEUS Right 5/17/2023    Procedure: RIGHT EXCISION ANTERIOR PROCESS CALCANEUS, REPAIRS AS INDICATED;  Surgeon: García Wallace M.D.;  Location: [facility];  Service: Orthopedics    MA LAP,DIAGNOSTIC ABDOMEN  8/12/2020    Procedure: LAPAROSCOPY- DIAGNOSTIC;  Surgeon: Heron Reyes M.D.;  Location: [facility];  Service: General    LAPAROSCOPIC LYSIS OF ADHESIONS  8/12/2020    Procedure: LYSIS, ADHESIONS, LAPAROSCOPIC;  Surgeon: Heron Reyes M.D.;  Location: [facility];  Service: General    BREAST IMPLANT REVISION Left 7/24/2019    Procedure: INSERTION, IMPLANT, BREAST- EXCHANGE SILICONE;  Surgeon: Bg Dumont M.D.;  Location: [facility];  Service: Plastics    CAPSULECTOMY N/A 7/24/2019    Procedure: CAPSULECTOMY;  Surgeon: Bg Dumont M.D.;  Location: [facility];  Service: Plastics    BREAST RECONSTRUCTION N/A 7/24/2019    Procedure: RECONSTRUCTION, BREAST - PLACEMENT OF " STRATTICE, REVISION OF RIGHT FULL BREAST LIFT AND STRACTTICE REINFORCEMENT;  Surgeon: Bg Dumont M.D.;  Location: [facility];  Service: Plastics    RI SUSPENSION OF BREAST Bilateral 5/13/2019    Procedure: MASTOPEXY - LOWER W/REPOSITIONING OF LATERAL FOLDS;  Surgeon: Bg Dumont M.D.;  Location: [facility];  Service: Plastics    BREAST IMPLANT REVISION Bilateral 5/13/2019    Procedure: INSERTION, IMPLANT, BREAST - EXCHANGE SILICONE;  Surgeon: Bg Dumont M.D.;  Location: [facility];  Service: Plastics    CAPSULECTOMY Right 5/13/2019    Procedure: CAPSULECTOMY;  Surgeon: Bg Dumont M.D.;  Location: [facility];  Service: Plastics    LOW ANTERIOR RESECTION ROBOTIC XI N/A 3/14/2018    Procedure: LOW ANTERIOR RESECTION ROBOTIC XI- FOR SUBTOTAL COLECTOMY W/LOW ANTERIOR ANASTOMOSIS;  Surgeon: Tigre Javed M.D.;  Location: [facility];  Service: General    INGUINAL HERNIA REPAIR ROBOTIC Left 9/27/2017    Procedure: INGUINAL HERNIA REPAIR ROBOTIC RECURRENT;  Surgeon: Tigre Javed M.D.;  Location: [facility];  Service: Gen Robotic    GROIN EXPLORATION Left 8/30/2016    Procedure: GROIN EXPLORATION;  Surgeon: Tigre Javed M.D.;  Location: [facility];  Service:     VENTRAL HERNIA REPAIR  8/30/2016    Procedure: VENTRAL HERNIA REPAIR ;  Surgeon: Tigre Javed M.D.;  Location: [facility];  Service:     COLON RESECTION ROBOTIC Right 12/7/2015    Procedure: COLON RESECTION ROBOTIC for: Hemicolectomy;  Surgeon: Tigre Javed M.D.;  Location: [facility];  Service:     LAPAROSCOPY ROBOTIC XI  9/1/2015    Procedure: LAPAROSCOPY ROBOTIC XI FOR: ENTEROLYSIS AND METAL CLIP REMOVAL;  Surgeon: Tigre Javed M.D.;  Location: [facility];  Service:     LAPAROSCOPY  1/15/2014    Performed by Heron Reyes M.D. at [facility]    PELVISCOPY  4/3/2013    Performed by Genaro  PURVI Hadley M.D. at SURGERY SAME DAY [facility] ORS    BREAST IMPLANT REVISION  10/11/2011    Performed by ANA ROBERTS at SURGERY [facility] ORS    MASTOPEXY  10/11/2011    Performed by ANA ROBERTS at SURGERY [facility] ORS    SCAR REVISION  10/11/2011    Performed by ANA ROBERTS at SURGERY [facility]    ABDOMINOPLASTY  4/28/2011    Performed by ANA ROBERTS at SURGERY [facility] ORS    LIPOSUCTION  4/28/2011    Performed by ANA ROBERTS at SURGERY [facility] ORS    VAGINAL HYSTERECTOMY TOTAL  4/4/2011    Performed by TRISTIN HADLEY at SURGERY SAME DAY [facility] ORS    CYSTOSCOPY  4/4/2011    Performed by TRISTIN HADLEY at SURGERY SAME DAY [facility] ORS    PRIMARY C SECTION  1/13/2011    Performed by VIET CISNEROS at LABOR AND DELIVERY    ANA BY LAPAROSCOPY  6/6/2009    Performed by LOUIS FREEMAN at SURGERY [facility] ORS    CARPAL TUNNEL RELEASE  3/24/2009    Performed by MARSHA ALCANTARA at SURGERY SAME DAY [facility] ORS    PELVISCOPY  9/3/08    Performed by TAWNY TOBAR at SURGERY SAME DAY [facility] ORS    LYSIS ADHESIONS GYN  9/3/08    Performed by TAWNY TOBAR at SURGERY SAME DAY [facility]    HYSTEROSCOPY WITH VIDEO OPERATIVE  4/7/08    Performed by TAWNY TOBAR at SURGERY [facility] ORS    LAPAROSCOPY  11/15/2007    at [facility] out pt clinic seven laparoscopies (for enhdometriosis)    AL CHOLECYSTOENTEROSTOMY  2002    TONSILLECTOMY  2000    MAMMOPLASTY AUGMENTATION Bilateral 2000    HYSTERECTOMY, TOTAL ABDOMINAL       SOCHX:  reports that she has never smoked. She has never used smokeless tobacco. She reports that she does not currently use alcohol after a past usage of about 1.8 oz of alcohol per week. She reports that she does not use drugs.    FH: Per HPI as applicable/pertinent.      Objective:     /60 (BP Location: Left arm, Patient Position: Sitting, BP Cuff Size: Adult)   Pulse 100   Temp 36.3 °C (97.4 °F) (Temporal)   Resp 18   Ht  "1.575 m (5' 2\")   Wt 74.8 kg (165 lb)   LMP 12/01/2011 (Approximate)   SpO2 98%   BMI 30.18 kg/m²     Physical Exam  Nursing note reviewed.   Constitutional:       General: She is not in acute distress.     Appearance: She is well-developed. She is not ill-appearing or toxic-appearing.   HENT:      Head: Normocephalic.      Nose: Congestion and rhinorrhea present. Rhinorrhea is clear.      Mouth/Throat:      Mouth: Mucous membranes are moist.      Pharynx: Uvula midline. Pharyngeal swelling and posterior oropharyngeal erythema present.   Eyes:      General: Vision grossly intact.      Extraocular Movements: Extraocular movements intact.      Conjunctiva/sclera: Conjunctivae normal.   Neck:      Trachea: Phonation normal.   Cardiovascular:      Rate and Rhythm: Normal rate and regular rhythm.      Heart sounds: Normal heart sounds.   Pulmonary:      Effort: Pulmonary effort is normal. No respiratory distress.      Breath sounds: Normal breath sounds. No decreased breath sounds.   Chest:      Chest wall: Tenderness present.   Musculoskeletal:         General: No deformity. Normal range of motion.      Cervical back: Normal range of motion and neck supple.   Skin:     General: Skin is warm and dry.      Coloration: Skin is not pale.   Neurological:      Mental Status: She is alert and oriented to person, place, and time.      Motor: No weakness.   Psychiatric:         Behavior: Behavior normal. Behavior is cooperative.     Chest x-ray:    Details    Reading Physician Reading Date Result Priority   Leander Mcclendon M.D.  [phone] 10/23/2023 Urgent Care     Narrative & Impression     10/23/2023 8:24 AM     HISTORY/REASON FOR EXAM:  Cough     TECHNIQUE/EXAM DESCRIPTION AND NUMBER OF VIEWS:  Two views of the chest.     COMPARISON:  5/4/2022     FINDINGS:  HEART: Not enlarged.  LUNGS: No areas of air space disease are demonstrated.  PLEURA: No effusion or pneumothorax.     IMPRESSION:     No evidence of acute " cardiopulmonary disease           Exam Ended: 10/23/23  8:32 AM Last Resulted: 10/23/23  8:35 AM           Radiology report and images reviewed by myself. Concur with findings.    POCT Cepheid CoV-2, Flu A/B, RSV by PCR: negative    POCT Cepheid Group A Strep by PCR: negative      Assessment/Plan:     1. Acute cough  - POCT CEPHEID COV-2, FLU A/B, RSV - PCR  - DX-CHEST-2 VIEWS; Future  - promethazine-dextromethorphan (PROMETHAZINE-DM) 6.25-15 MG/5ML syrup; Take 5 mL by mouth every four hours as needed for Cough.  Dispense: 120 mL; Refill: 0    2. Pharyngitis, unspecified etiology  - POCT CEPHEID GROUP A STREP - PCR  - lidocaine (XYLOCAINE) 2 % Solution; Take 15 mL by mouth every 3 hours as needed for Throat/Mouth Pain. Swish/gargle well, then spit out  Dispense: 100 mL; Refill: 0    3. Nasal congestion  - promethazine-dextromethorphan (PROMETHAZINE-DM) 6.25-15 MG/5ML syrup; Take 5 mL by mouth every four hours as needed for Cough.  Dispense: 120 mL; Refill: 0    4. Viral URI    Discussed likely self-limiting viral etiology and expected course and duration of illness. Vital signs stable, afebrile, no acute distress at this time. Chest x-ray clear. PCR testing negative.    Emphasize supportive measures, rest, fluids, and OTC medication PRN such as ibuprofen and acetaminophen. Standard precautions/mask/wash hands. Monitor. Return precautions advised.     Differential diagnosis, natural history, supportive care, over-the-counter symptom management per 's instructions, close monitoring, and indications for immediate follow-up discussed.     All questions answered. Patient agrees with the plan of care.    Discharge summary provided.    Work note provided.     Billing note: moderate complexity and moderate risk. Established patient. [id number]. Please refer to LOS tool for details.

## 2023-10-23 NOTE — LETTER
October 23, 2023         Patient: Kathleen Alvarado   YOB: 1980   Date of Visit: 10/23/2023           To Whom it May Concern:    Kathleen Alvarado was seen in my clinic on 10/23/2023 due to illness. Due to medical necessity, please excuse patient from work for the next 3 days as needed.     If you have any questions or concerns, please don't hesitate to call.        Sincerely,         NIDIA Wong.  Electronically Signed

## 2023-11-13 DIAGNOSIS — E78.5 DYSLIPIDEMIA: ICD-10-CM

## 2023-11-14 RX ORDER — ROSUVASTATIN CALCIUM 20 MG/1
20 TABLET, COATED ORAL EVERY EVENING
Qty: 90 TABLET | Refills: 4 | Status: SHIPPED | OUTPATIENT
Start: 2023-11-14

## 2023-11-16 ENCOUNTER — HOSPITAL ENCOUNTER (OUTPATIENT)
Dept: RADIOLOGY | Facility: MEDICAL CENTER | Age: 43
End: 2023-11-16
Attending: FAMILY MEDICINE
Payer: COMMERCIAL

## 2023-11-16 DIAGNOSIS — M79.661 RIGHT CALF PAIN: ICD-10-CM

## 2023-11-16 DIAGNOSIS — R07.89 CHEST DISCOMFORT: ICD-10-CM

## 2023-11-16 PROCEDURE — 71275 CT ANGIOGRAPHY CHEST: CPT

## 2023-11-16 PROCEDURE — 700117 HCHG RX CONTRAST REV CODE 255: Performed by: FAMILY MEDICINE

## 2023-11-16 RX ADMIN — IOHEXOL 80 ML: 350 INJECTION, SOLUTION INTRAVENOUS at 11:46

## 2023-11-19 ENCOUNTER — OFFICE VISIT (OUTPATIENT)
Dept: URGENT CARE | Facility: CLINIC | Age: 43
End: 2023-11-19
Payer: COMMERCIAL

## 2023-11-19 ENCOUNTER — HOSPITAL ENCOUNTER (EMERGENCY)
Facility: MEDICAL CENTER | Age: 43
End: 2023-11-19
Payer: COMMERCIAL

## 2023-11-19 VITALS
BODY MASS INDEX: 29.23 KG/M2 | TEMPERATURE: 97 F | HEART RATE: 98 BPM | OXYGEN SATURATION: 98 % | SYSTOLIC BLOOD PRESSURE: 130 MMHG | WEIGHT: 165 LBS | DIASTOLIC BLOOD PRESSURE: 64 MMHG | RESPIRATION RATE: 14 BRPM | HEIGHT: 63 IN

## 2023-11-19 DIAGNOSIS — M79.661 RIGHT CALF PAIN: ICD-10-CM

## 2023-11-19 PROCEDURE — 3075F SYST BP GE 130 - 139MM HG: CPT | Performed by: PHYSICIAN ASSISTANT

## 2023-11-19 PROCEDURE — 99214 OFFICE O/P EST MOD 30 MIN: CPT | Performed by: PHYSICIAN ASSISTANT

## 2023-11-19 PROCEDURE — 3078F DIAST BP <80 MM HG: CPT | Performed by: PHYSICIAN ASSISTANT

## 2023-11-19 RX ORDER — ASPIRIN 81 MG/1
81 TABLET, CHEWABLE ORAL DAILY
COMMUNITY

## 2023-11-19 ASSESSMENT — ENCOUNTER SYMPTOMS
DIARRHEA: 0
HEADACHES: 0
SORE THROAT: 0
VOMITING: 0
SHORTNESS OF BREATH: 0
NAUSEA: 0
CHILLS: 0
EYE PAIN: 0
MYALGIAS: 0
FEVER: 0
ABDOMINAL PAIN: 0
COUGH: 0
CONSTIPATION: 0

## 2023-11-19 ASSESSMENT — FIBROSIS 4 INDEX: FIB4 SCORE: 0.41

## 2023-11-19 NOTE — PROGRESS NOTES
"Subjective:   Kathleen Alvarado is a 43 y.o. female who presents for Deep Vein Thrombosis (Sx x on going , more pain than usual )      Is a 43-year-old female who has had right-sided calf pain ongoing for around 1 month.  Actually seen by her primary around a month ago and ultrasound was ordered but patient has had difficulty obtaining the ultrasound.  She presents today eyes when she woke up this morning the calf pain is more acutely worse and feels more swollen.  She is not aware of any precipitating trauma or activities that may have caused more pain.  Pain is worse with weightbearing.  She has not noted any chest pain or shortness of breath.  She had a CTA performed that ruled out pulmonary embolus several days ago    Review of Systems   Constitutional:  Negative for chills and fever.   HENT:  Negative for congestion, ear pain and sore throat.    Eyes:  Negative for pain.   Respiratory:  Negative for cough and shortness of breath.    Cardiovascular:  Positive for leg swelling. Negative for chest pain.   Gastrointestinal:  Negative for abdominal pain, constipation, diarrhea, nausea and vomiting.   Genitourinary:  Negative for dysuria.   Musculoskeletal:  Negative for myalgias.   Skin:  Negative for rash.   Neurological:  Negative for headaches.       Medications, Allergies, and current problem list reviewed today in Epic.     Objective:     /64 (BP Location: Left arm, Patient Position: Sitting, BP Cuff Size: Large adult)   Pulse 98   Temp 36.1 °C (97 °F) (Temporal)   Resp 14   Ht 1.6 m (5' 3\")   Wt 74.8 kg (165 lb)   SpO2 98%     Physical Exam  Vitals reviewed.   Constitutional:       Appearance: Normal appearance.   HENT:      Head: Normocephalic and atraumatic.      Right Ear: External ear normal.      Left Ear: External ear normal.      Nose: Nose normal.      Mouth/Throat:      Mouth: Mucous membranes are moist.   Eyes:      Conjunctiva/sclera: Conjunctivae normal.   Cardiovascular:      Rate and " Rhythm: Normal rate.   Pulmonary:      Effort: Pulmonary effort is normal.   Musculoskeletal:      Comments: Positive Homans, right calf tenderness with deep palpation, full strength and range of motion pain with resisted plantarflexion.  Right calf 1 cm larger than the left calf   Skin:     General: Skin is warm and dry.      Capillary Refill: Capillary refill takes less than 2 seconds.   Neurological:      Mental Status: She is alert and oriented to person, place, and time.         Assessment/Plan:     Diagnosis and associated orders:     1. Right calf pain  US-EXTREMITY VENOUS LOWER UNILAT RIGHT         Comments/MDM:     Patient obviously needs work-up for DVT, given her comorbidities I am disinclined to perform a D-dimer.  She does not have any overt risk factors such as estrogen use, immobilization or tobacco use however her symptoms are concerning especially given the duration.  We attempted to obtain an outpatient ultrasound however ultrasound was not available today, tomorrow and was only available late the following day which I am not comfortable having her delay diagnosis for that long.  I discussed this with her and she was amenable to ER evaluation to ascertain the cause of her symptoms and any treatment indicated         Differential diagnosis, natural history, supportive care, and indications for immediate follow-up discussed.    Advised the patient to follow-up with the primary care physician for recheck, reevaluation, and consideration of further management.    Please note that this dictation was created using voice recognition software. I have made a reasonable attempt to correct obvious errors, but I expect that there are errors of grammar and possibly content that I did not discover before finalizing the note.    This note was electronically signed by García Khan PA-C

## 2023-12-01 ENCOUNTER — TELEMEDICINE (OUTPATIENT)
Dept: MEDICAL GROUP | Facility: LAB | Age: 43
End: 2023-12-01
Payer: COMMERCIAL

## 2023-12-01 VITALS — WEIGHT: 170 LBS | BODY MASS INDEX: 31.28 KG/M2 | HEIGHT: 62 IN

## 2023-12-01 DIAGNOSIS — E03.9 HYPOTHYROIDISM, UNSPECIFIED TYPE: ICD-10-CM

## 2023-12-01 DIAGNOSIS — F41.9 ANXIETY: ICD-10-CM

## 2023-12-01 DIAGNOSIS — M79.661 RIGHT CALF PAIN: ICD-10-CM

## 2023-12-01 PROCEDURE — 99214 OFFICE O/P EST MOD 30 MIN: CPT | Mod: 95 | Performed by: FAMILY MEDICINE

## 2023-12-01 ASSESSMENT — FIBROSIS 4 INDEX: FIB4 SCORE: 0.41

## 2023-12-01 NOTE — PROGRESS NOTES
Virtual Visit: Established Patient   This visit was conducted via Zoom using secure and encrypted videoconferencing technology.   The patient was in their home in the [facility].    The patient's identity was confirmed and verbal consent was obtained for this virtual visit.     Subjective:   CC:   Chief Complaint   Patient presents with    Stress    Panic Attack    Anxiety       Kathleen Alvarado is a 43 y.o. female presenting for evaluation and management of:    1. Hypothyroidism, unspecified type  Chronic on medications, recent TSH was suppressed, patient dose was adjusted, advised to recheck thyroid function    2. Anxiety  Chronic ongoing but there is recent exacerbation of her symptoms of anxiety and PTSD, patient said she is going through a lot of stress at home and at work.  Unable to function well at work because of lack of concentration and anxiety and panic attacks.  Requesting time off, patient is continuing to take her Prozac 40 mg, she is scheduled to see her psychiatrist for further evaluation today.  Denies intentions to harm self or others or suicidal ideations    3. Right calf pain    Patient ultrasound did not show DVT, still having pain and discomfort in the right calf, seen orthopedics without resolution of her concern.  Patient said she is wearing a cast now still having the right calf pain, denies shortness of breath or  chest pain    ROS       Current medicines (including changes today)  Current Outpatient Medications   Medication Sig Dispense Refill    meloxicam (MOBIC) 7.5 MG Tab Take 1 Tablet by mouth every day. 30 Tablet 0    aspirin (ASA) 81 MG Chew Tab chewable tablet Chew 81 mg every day.      rosuvastatin (CRESTOR) 20 MG Tab TAKE 1 TABLET BY MOUTH EVERY DAY IN THE EVENING 90 Tablet 4    promethazine-dextromethorphan (PROMETHAZINE-DM) 6.25-15 MG/5ML syrup Take 5 mL by mouth every four hours as needed for Cough. 120 mL 0    lidocaine (XYLOCAINE) 2 % Solution Take 15 mL by mouth every  3 hours as needed for Throat/Mouth Pain. Swish/gargle well, then spit out 100 mL 0    levothyroxine (SYNTHROID) 100 MCG Tab Take 1 Tablet by mouth every morning on an empty stomach. 90 Tablet 1    escitalopram (LEXAPRO) 10 MG Tab Take 10 mg by mouth at bedtime.      hydrOXYzine pamoate (VISTARIL) 25 MG Cap TAKE 1 CAPSULE BY MOUTH DAILY AT BEDTIME AS NEEDED FOR ANXIETY.      hydrOXYzine pamoate (VISTARIL) 50 MG Cap TAKE 1 CAPSULE BY MOUTH 3 TIMES A DAY AS NEEDED FOR ITCHING OR OTHER (NAUSEA, ANXIETY, INSOMNIA).      rosuvastatin (CRESTOR) 10 MG Tab TAKE 1 TABLET BY MOUTH EVERY DAY IN THE EVENING (Patient not taking: Reported on 11/19/2023) 30 Tablet 11    fluoxetine (PROZAC) 40 MG capsule Take 1 Capsule by mouth every day. 90 Capsule 1    pregabalin (LYRICA) 225 MG capsule Take 225 mg by mouth 2 times a day.      ondansetron (ZOFRAN) 4 MG Tab tablet Take 4 mg by mouth every 8 hours as needed.      acyclovir (ZOVIRAX) 800 MG Tab TK 1 T PO BID FOR 10 DAYS      FLUoxetine (PROZAC) 20 MG Cap Take 1 Capsule by mouth every day. 90 Capsule 1    metoclopramide (REGLAN) 10 MG Tab Take 10 mg by mouth 3 times a day.      acetaminophen (TYLENOL) 500 MG Tab Take 2 tabs up to 3 times a day prn pain 60 Tablet 0    methylPREDNISolone (MEDROL DOSEPAK) 4 MG Tablet Therapy Pack Follow schedule on package instructions. 21 Tablet 0    vitamin D (CHOLECALCIFEROL) 1000 Unit Tab TAKE 1 TABLET BY MOUTH EVERY DAY 90 Tablet 1    ondansetron (ZOFRAN ODT) 4 MG TABLET DISPERSIBLE Take 1 Tablet by mouth every 6 hours as needed for Nausea. 5 Tablet 0    cyclobenzaprine (FLEXERIL) 10 mg Tab       pregabalin (LYRICA) 150 MG Cap        No current facility-administered medications for this visit.       Patient Active Problem List    Diagnosis Date Noted    Encounter to establish care with new doctor 08/11/2023     Priority: High    Ankle instability, right 03/02/2023    Screening for breast cancer 09/13/2022    Melena 05/04/2022    Postural dizziness  "with presyncope 05/04/2022    Abdominal pain with vomiting and history of abdominal surgery 02/01/2022    Generalized anxiety disorder 01/25/2022    Hepatic lesion 01/25/2022    Post concussion syndrome 01/05/2022    Insomnia due to medical condition 01/05/2022    Fatigue 10/25/2021    Migraine 10/25/2021    Hormone replacement therapy 10/25/2021    History of TIA (transient ischemic attack) 10/25/2021    Depression 06/15/2021    Gastroparesis 05/18/2021    Vitamin D deficiency 05/18/2021    Abdominal pain 03/18/2018    Narcotic dependence (HCC) 12/25/2015    Chronic back pain 07/21/2011    Seasonal allergies 07/21/2011    Acne 07/21/2011    Hypothyroidism 07/21/2011        Objective:   Ht 1.575 m (5' 2\")   Wt 77.1 kg (170 lb)   LMP 12/01/2011 (Approximate)   BMI 31.09 kg/m²     Physical Exam:  Constitutional: Alert, no distress, well-groomed.  Skin: No rashes in visible areas.  Eye: Round. Conjunctiva clear, lids normal. No icterus.   ENMT: Lips pink without lesions, good dentition, moist mucous membranes. Phonation normal.  Neck: No masses, no thyromegaly. Moves freely without pain.  Respiratory: Unlabored respiratory effort, no cough or audible wheeze  Psych: Alert and oriented x3, normal affect and mood.     Assessment and Plan:   The following treatment plan was discussed:     1. Hypothyroidism, unspecified type  Recheck thyroid function, to adjust the dose.  - TSH+FREE T4    2. Anxiety  New exacerbation, with uncontrolled anxiety panic attack and PTSD symptoms.  Patient to continue Prozac, she has an appointment to follow-up with her psychiatrist, letter to submit to her job for 4 weeks off work for avoiding stress was provided to patient today, follow-up as directed.  3. Right calf pain  Ongoing problem unresolved.  Unclear etiology, DVT was ruled out, advised to do a CT for further evaluation and assessment.  - CT-EXTREMITY, LOWER W/O RIGHT; Future      Follow-up: No follow-ups on file.          "

## 2023-12-13 ENCOUNTER — APPOINTMENT (OUTPATIENT)
Dept: RADIOLOGY | Facility: MEDICAL CENTER | Age: 43
End: 2023-12-13
Attending: FAMILY MEDICINE
Payer: COMMERCIAL

## 2023-12-21 ENCOUNTER — APPOINTMENT (OUTPATIENT)
Dept: MEDICAL GROUP | Facility: LAB | Age: 43
End: 2023-12-21
Payer: COMMERCIAL

## 2024-02-27 ENCOUNTER — OFFICE VISIT (OUTPATIENT)
Dept: MEDICAL GROUP | Facility: LAB | Age: 44
End: 2024-02-27
Payer: COMMERCIAL

## 2024-02-27 VITALS
RESPIRATION RATE: 18 BRPM | SYSTOLIC BLOOD PRESSURE: 110 MMHG | HEIGHT: 62 IN | OXYGEN SATURATION: 99 % | WEIGHT: 172 LBS | DIASTOLIC BLOOD PRESSURE: 76 MMHG | HEART RATE: 98 BPM | TEMPERATURE: 97.5 F | BODY MASS INDEX: 31.65 KG/M2

## 2024-02-27 DIAGNOSIS — R51.9 INTRACTABLE HEADACHE, UNSPECIFIED CHRONICITY PATTERN, UNSPECIFIED HEADACHE TYPE: ICD-10-CM

## 2024-02-27 DIAGNOSIS — R82.90 URINE ABNORMALITY: ICD-10-CM

## 2024-02-27 DIAGNOSIS — R53.83 OTHER FATIGUE: ICD-10-CM

## 2024-02-27 LAB
FLUAV RNA SPEC QL NAA+PROBE: NEGATIVE
FLUBV RNA SPEC QL NAA+PROBE: NEGATIVE
RSV RNA SPEC QL NAA+PROBE: NEGATIVE
SARS-COV-2 RNA RESP QL NAA+PROBE: POSITIVE

## 2024-02-27 PROCEDURE — 3078F DIAST BP <80 MM HG: CPT | Performed by: FAMILY MEDICINE

## 2024-02-27 PROCEDURE — 0241U POCT CEPHEID COV-2, FLU A/B, RSV - PCR: CPT | Performed by: FAMILY MEDICINE

## 2024-02-27 PROCEDURE — 99214 OFFICE O/P EST MOD 30 MIN: CPT | Performed by: FAMILY MEDICINE

## 2024-02-27 PROCEDURE — 3074F SYST BP LT 130 MM HG: CPT | Performed by: FAMILY MEDICINE

## 2024-02-27 ASSESSMENT — FIBROSIS 4 INDEX: FIB4 SCORE: 0.41

## 2024-02-27 NOTE — PROGRESS NOTES
Chief Complaint:   Chief Complaint   Patient presents with    Headache    Vertigo    Emesis       HPI: Established patient  Kathleen Alvarado is a 43 y.o. female who presents for follow-up and evaluation of the following today:    New concern, symptoms started initially around other fatigue /Intractable headache,   20 days ago, patient said after feeling her tooth and following up on her dental workup.  She felt with subjective fever tiredness fatigue flushing chills.  She called her dentist and he started her on Augmentin for 10 days, completed Augmentin and felt better for few days and then restarted around 7 days ago to feel tired again, with headache and nausea and vomiting.  In body aches and fatigue.      Urine abnormality    Reports yesterday she was unable to pee, so she started to hydrate well and feeling better now.        Past medical history, family history, social history and medications reviewed and updated in the record.  Today  Current medications, problem list and allergies reviewed in [facility] today  Health maintenance topics are reviewed and updated.    Patient Active Problem List    Diagnosis Date Noted    Encounter to establish care with new doctor 08/11/2023    Ankle instability, right 03/02/2023    Screening for breast cancer 09/13/2022    Melena 05/04/2022    Postural dizziness with presyncope 05/04/2022    Abdominal pain with vomiting and history of abdominal surgery 02/01/2022    Generalized anxiety disorder 01/25/2022    Hepatic lesion 01/25/2022    Post concussion syndrome 01/05/2022    Insomnia due to medical condition 01/05/2022    Fatigue 10/25/2021    Migraine 10/25/2021    Hormone replacement therapy 10/25/2021    History of TIA (transient ischemic attack) 10/25/2021    Depression 06/15/2021    Gastroparesis 05/18/2021    Vitamin D deficiency 05/18/2021    Abdominal pain 03/18/2018    Narcotic dependence (HCC) 12/25/2015    Chronic back pain 07/21/2011    Seasonal allergies 07/21/2011     Acne 07/21/2011    Hypothyroidism 07/21/2011     Family History   Problem Relation Age of Onset    Lung Disease Mother     Stroke Father     Arthritis Father         gout    Heart Disease Paternal Grandfather     Hypertension Other     Stroke Other      Social History     Socioeconomic History    Marital status: Single     Spouse name: Not on file    Number of children: Not on file    Years of education: Not on file    Highest education level: Not on file   Occupational History     Comment:    Tobacco Use    Smoking status: Never    Smokeless tobacco: Never    Tobacco comments:     exposed to secondhand smoke in childhood   Vaping Use    Vaping Use: Never used   Substance and Sexual Activity    Alcohol use: Not Currently     Alcohol/week: 1.8 oz     Types: 3 Glasses of wine per week    Drug use: No    Sexual activity: Yes     Partners: Male   Other Topics Concern    Not on file   Social History Narrative    Not on file     Social Determinants of Health     Financial Resource Strain: Not on file   Food Insecurity: Not on file   Transportation Needs: Not on file   Physical Activity: Not on file   Stress: Not on file   Social Connections: Not on file   Intimate Partner Violence: Not on file   Housing Stability: Not on file       Current Outpatient Medications   Medication Sig Dispense Refill    meloxicam (MOBIC) 7.5 MG Tab TAKE 1 TABLET BY MOUTH EVERY DAY 45 Tablet 0    traZODone (DESYREL) 50 MG Tab Take 50 mg by mouth at bedtime as needed.      hydrOXYzine HCl (ATARAX) 25 MG Tab Take 25 mg by mouth 3 times a day as needed.      busPIRone (BUSPAR) 5 MG tablet Take 5 mg by mouth 2 times a day.      aspirin (ASA) 81 MG Chew Tab chewable tablet Chew 81 mg every day.      rosuvastatin (CRESTOR) 20 MG Tab TAKE 1 TABLET BY MOUTH EVERY DAY IN THE EVENING 90 Tablet 4    promethazine-dextromethorphan (PROMETHAZINE-DM) 6.25-15 MG/5ML syrup Take 5 mL by mouth every four hours as needed for Cough. 120 mL 0     "lidocaine (XYLOCAINE) 2 % Solution Take 15 mL by mouth every 3 hours as needed for Throat/Mouth Pain. Swish/gargle well, then spit out 100 mL 0    levothyroxine (SYNTHROID) 100 MCG Tab Take 1 Tablet by mouth every morning on an empty stomach. 90 Tablet 1    escitalopram (LEXAPRO) 10 MG Tab Take 10 mg by mouth at bedtime.      hydrOXYzine pamoate (VISTARIL) 25 MG Cap TAKE 1 CAPSULE BY MOUTH DAILY AT BEDTIME AS NEEDED FOR ANXIETY.      hydrOXYzine pamoate (VISTARIL) 50 MG Cap TAKE 1 CAPSULE BY MOUTH 3 TIMES A DAY AS NEEDED FOR ITCHING OR OTHER (NAUSEA, ANXIETY, INSOMNIA).      rosuvastatin (CRESTOR) 10 MG Tab TAKE 1 TABLET BY MOUTH EVERY DAY IN THE EVENING (Patient not taking: Reported on 11/19/2023) 30 Tablet 11    pregabalin (LYRICA) 225 MG capsule Take 225 mg by mouth 2 times a day.      ondansetron (ZOFRAN) 4 MG Tab tablet Take 4 mg by mouth every 8 hours as needed.      acyclovir (ZOVIRAX) 800 MG Tab TK 1 T PO BID FOR 10 DAYS      FLUoxetine (PROZAC) 20 MG Cap Take 1 Capsule by mouth every day. 90 Capsule 1    metoclopramide (REGLAN) 10 MG Tab Take 10 mg by mouth 3 times a day.      acetaminophen (TYLENOL) 500 MG Tab Take 2 tabs up to 3 times a day prn pain 60 Tablet 0    methylPREDNISolone (MEDROL DOSEPAK) 4 MG Tablet Therapy Pack Follow schedule on package instructions. 21 Tablet 0    vitamin D (CHOLECALCIFEROL) 1000 Unit Tab TAKE 1 TABLET BY MOUTH EVERY DAY 90 Tablet 1    ondansetron (ZOFRAN ODT) 4 MG TABLET DISPERSIBLE Take 1 Tablet by mouth every 6 hours as needed for Nausea. 5 Tablet 0    cyclobenzaprine (FLEXERIL) 10 mg Tab       pregabalin (LYRICA) 150 MG Cap        No current facility-administered medications for this visit.         Review Of Systems  As documented in HPI above  PHYSICAL EXAMINATION:    /76 (BP Location: Left arm, Patient Position: Sitting, BP Cuff Size: Adult)   Pulse 98   Temp 36.4 °C (97.5 °F) (Temporal)   Resp 18   Ht 1.575 m (5' 2\")   Wt 78 kg (172 lb)   LMP 12/01/2011 " (Approximate)   SpO2 99%   BMI 31.46 kg/m²   Gen.: Well-developed, well-nourished, no apparent distress, pleasant and cooperative with the examination  HEENT: Normocephalic/atraumatic, sinuses nontender with palpation, TMs clear, nares patent with pink mucosa and clear rhinorrhea, oropharynx clear  Neck: No JVD or bruits, no adenopathy  Cor: Regular rate and rhythm without murmur gallop or rub  Lungs: Clear to auscultation with equal breath sounds bilaterally. No wheezes, rhonchi.  Abdomen: Soft nontender without hepatosplenomegaly or masses appreciated, normoactive bowel sounds  Extremities: No cyanosis, clubbing or edema       ASSESSMENT/Plan:  1. other fatigue      New concern, ongoing for almost a month now.  Advised to do workup for further evaluation, will also rule out viral infection  CBC WITH DIFFERENTIAL    Comp Metabolic Panel    TSH WITH REFLEX TO FT4    Sed Rate    POCT CoV-2, Flu A/B, RSV by PCR      2. Intractable headache, unspecified chronicity pattern, unspecified headache type  Positive COVID test at the office.  Patient to continue supportive care, she passed the timeframe for Paxlovid.  POCT CoV-2, Flu A/B, RSV by PCR      3. Urine abnormality  URINALYSIS,CULTURE IF INDICATED    URINALYSIS,CULTURE IF INDICATED         Please note that this dictation was created using voice recognition software. I have worked with consultants from the vendor as well as technical experts from [facility] to optimize the interface. I have made every reasonable attempt to correct obvious errors, but I expect that there are errors of grammar and possibly content that I did not discover before finalizing the note.

## 2024-03-05 ENCOUNTER — OFFICE VISIT (OUTPATIENT)
Dept: URGENT CARE | Facility: CLINIC | Age: 44
End: 2024-03-05
Payer: COMMERCIAL

## 2024-03-05 VITALS
WEIGHT: 172.6 LBS | OXYGEN SATURATION: 99 % | HEIGHT: 63 IN | RESPIRATION RATE: 16 BRPM | SYSTOLIC BLOOD PRESSURE: 118 MMHG | TEMPERATURE: 97.2 F | HEART RATE: 93 BPM | BODY MASS INDEX: 30.58 KG/M2 | DIASTOLIC BLOOD PRESSURE: 62 MMHG

## 2024-03-05 DIAGNOSIS — R51.9 NONINTRACTABLE HEADACHE, UNSPECIFIED CHRONICITY PATTERN, UNSPECIFIED HEADACHE TYPE: ICD-10-CM

## 2024-03-05 DIAGNOSIS — U07.1 COVID-19: ICD-10-CM

## 2024-03-05 PROCEDURE — 3078F DIAST BP <80 MM HG: CPT | Performed by: FAMILY MEDICINE

## 2024-03-05 PROCEDURE — 3074F SYST BP LT 130 MM HG: CPT | Performed by: FAMILY MEDICINE

## 2024-03-05 PROCEDURE — 99214 OFFICE O/P EST MOD 30 MIN: CPT | Performed by: FAMILY MEDICINE

## 2024-03-05 RX ORDER — PREDNISONE 20 MG/1
40 TABLET ORAL EVERY EVENING
Qty: 10 TABLET | Refills: 0 | Status: SHIPPED | OUTPATIENT
Start: 2024-03-05 | End: 2024-03-10

## 2024-03-05 RX ORDER — PROCHLORPERAZINE MALEATE 5 MG/1
5 TABLET ORAL EVERY EVENING
Qty: 5 TABLET | Refills: 0 | Status: SHIPPED | OUTPATIENT
Start: 2024-03-05

## 2024-03-05 ASSESSMENT — FIBROSIS 4 INDEX: FIB4 SCORE: 0.41

## 2024-03-05 ASSESSMENT — ENCOUNTER SYMPTOMS: HEADACHES: 1

## 2024-03-05 NOTE — PROGRESS NOTES
"Shahnaz Alvarado is a 43 y.o. female who presents with Migraine (Sever migraines started a  month ago but have gotten  worse for the last two weeks, taking motrin and tylenol ), Fatigue, and Body Aches    - This is a very pleasant 43 y.o. who has come to the walk-in clinic today for ongoing migraine headaches.  Says about a month ago came down with flulike symptoms aches malaise headaches possibly some fevers initially and did a COVID test and was positive.  Headaches were initially intermittent but more constant in the past 1 to 2 weeks.  Mainly occipital area and made worse with noise and light and has some nausea at times.  No recent head trauma or focal limb weakness numbness.  Sometimes if turns head quickly gets a little dizziness vertigo-like sensation.    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 7/22/19-resolved after colon surgery.    Cold 05/03/2019    Sinus infection, beginning of April, better now, denies SOB, productive cough    High cholesterol 08/2018    History of, related colon surgery-no meds    History of anemia     Hx of endometriosis     Migraine     Pain 05/2019    stomach, lower back    Pneumonia 12/2018    Polycystic ovaries     abn menses, vag bleeding x 2 months- has now had hysterectomy    PONV (postoperative nausea and vomiting)     Psychiatric problem     depression, anxiety    Seizure (HCC) 06/2017    d/t \"thyroid level being off\"    Stroke ([facility]) 08/2018    Slight right sided weakness.  (Stroke related to colon surgery)-7/22/19-RESOLVED.     Thyroid condition     " hypothyroid        PSH:  Past Surgical History:   Procedure Laterality Date    PB REPAIR COLLAT ANKLE LIGMNT,SECONDARY Right 5/17/2023    Procedure: RIGHT ANKLE ARTHROSCOPY, RIGHT MEDIAL AND LATERAL LIGAMENT RECONSTRUCTION;  Surgeon: García Wallace M.D.;  Location: [facility];  Service: Orthopedics    PB RELEASE TIB/FIB/ANKLE FLEX TENDON,EA Right 5/17/2023    Procedure: EVALUATE RIGHT PERONEAL TENDON;  Surgeon: García Wallace M.D.;  Location: [facility];  Service: Orthopedics    PB PART REMV TALUS OR CALCANEUS Right 5/17/2023    Procedure: RIGHT EXCISION ANTERIOR PROCESS CALCANEUS, REPAIRS AS INDICATED;  Surgeon: García Wallace M.D.;  Location: [facility];  Service: Orthopedics    PA LAP,DIAGNOSTIC ABDOMEN  8/12/2020    Procedure: LAPAROSCOPY- DIAGNOSTIC;  Surgeon: Heron Reyes M.D.;  Location: [facility];  Service: General    LAPAROSCOPIC LYSIS OF ADHESIONS  8/12/2020    Procedure: LYSIS, ADHESIONS, LAPAROSCOPIC;  Surgeon: Heron Reyes M.D.;  Location: [facility];  Service: General    BREAST IMPLANT REVISION Left 7/24/2019    Procedure: INSERTION, IMPLANT, BREAST- EXCHANGE SILICONE;  Surgeon: Bg Dumont M.D.;  Location: [facility];  Service: Plastics    CAPSULECTOMY N/A 7/24/2019    Procedure: CAPSULECTOMY;  Surgeon: Bg Dumont M.D.;  Location: [facility];  Service: Plastics    BREAST RECONSTRUCTION N/A 7/24/2019    Procedure: RECONSTRUCTION, BREAST - PLACEMENT OF STRATTICE, REVISION OF RIGHT FULL BREAST LIFT AND STRACTTICE REINFORCEMENT;  Surgeon: Bg Dumont M.D.;  Location: [facility];  Service: Plastics    PA SUSPENSION OF BREAST Bilateral 5/13/2019    Procedure: MASTOPEXY - LOWER W/REPOSITIONING OF LATERAL FOLDS;  Surgeon: Bg Dumont M.D.;  Location: [facility];  Service: Plastics    BREAST IMPLANT REVISION Bilateral 5/13/2019    Procedure:  INSERTION, IMPLANT, BREAST - EXCHANGE SILICONE;  Surgeon: Bg Dumont M.D.;  Location: [facility];  Service: Plastics    CAPSULECTOMY Right 5/13/2019    Procedure: CAPSULECTOMY;  Surgeon: Bg Dumont M.D.;  Location: [facility];  Service: Plastics    LOW ANTERIOR RESECTION ROBOTIC XI N/A 3/14/2018    Procedure: LOW ANTERIOR RESECTION ROBOTIC XI- FOR SUBTOTAL COLECTOMY W/LOW ANTERIOR ANASTOMOSIS;  Surgeon: Tigre Javed M.D.;  Location: [facility];  Service: General    INGUINAL HERNIA REPAIR ROBOTIC Left 9/27/2017    Procedure: INGUINAL HERNIA REPAIR ROBOTIC RECURRENT;  Surgeon: Tigre Javed M.D.;  Location: [facility];  Service: Gen Robotic    GROIN EXPLORATION Left 8/30/2016    Procedure: GROIN EXPLORATION;  Surgeon: Tigre Javed M.D.;  Location: [facility];  Service:     VENTRAL HERNIA REPAIR  8/30/2016    Procedure: VENTRAL HERNIA REPAIR ;  Surgeon: Tigre Javed M.D.;  Location: [facility];  Service:     COLON RESECTION ROBOTIC Right 12/7/2015    Procedure: COLON RESECTION ROBOTIC for: Hemicolectomy;  Surgeon: Tigre Javed M.D.;  Location: [facility];  Service:     LAPAROSCOPY ROBOTIC XI  9/1/2015    Procedure: LAPAROSCOPY ROBOTIC XI FOR: ENTEROLYSIS AND METAL CLIP REMOVAL;  Surgeon: Tigre Javed M.D.;  Location: [facility];  Service:     LAPAROSCOPY  1/15/2014    Performed by Heron Reyes M.D. at [facility]    PELVISCOPY  4/3/2013    Performed by Genaro Hadley M.D. at SURGERY SAME DAY [facility]    BREAST IMPLANT REVISION  10/11/2011    Performed by ANA DUMONT at [facility]    MASTOPEXY  10/11/2011    Performed by ANA DUMONT at [facility]    SCAR REVISION  10/11/2011    Performed by ANA DUMONT at [facility]    ABDOMINOPLASTY  4/28/2011    Performed by ANA DUMONT at [facility]     LIPOSUCTION  4/28/2011    Performed by ANA ROBERTS at SURGERY [facility] ORS    VAGINAL HYSTERECTOMY TOTAL  4/4/2011    Performed by TRITSIN LOPEZ at SURGERY SAME DAY [facility] ORS    CYSTOSCOPY  4/4/2011    Performed by TRISTIN LOPEZ at SURGERY SAME DAY [facility] ORS    PRIMARY C SECTION  1/13/2011    Performed by VIET CISNEROS at LABOR AND DELIVERY    ANA BY LAPAROSCOPY  6/6/2009    Performed by LOUIS FREEMAN at SURGERY [facility] ORS    CARPAL TUNNEL RELEASE  3/24/2009    Performed by MARSHA ALCANTARA at SURGERY SAME DAY [facility] ORS    PELVISCOPY  9/3/08    Performed by TAWNY TOBAR at SURGERY SAME DAY [facility] ORS    LYSIS ADHESIONS GYN  9/3/08    Performed by TAWNY TOBAR at SURGERY SAME DAY [facility] ORS    HYSTEROSCOPY WITH VIDEO OPERATIVE  4/7/08    Performed by TAWNY TOBAR at SURGERY [facility] ORS    LAPAROSCOPY  11/15/2007    at [facility] out pt clinic seven laparoscopies (for enhdometriosis)    [place] CHOLECYSTOENTEROSTOMY  2002    TONSILLECTOMY  2000    MAMMOPLASTY AUGMENTATION Bilateral 2000    HYSTERECTOMY, TOTAL ABDOMINAL         MEDS:    Current Outpatient Medications:     prochlorperazine (COMPAZINE) 5 MG Tab, Take 1 Tablet by mouth every evening., Disp: 5 Tablet, Rfl: 0    predniSONE (DELTASONE) 20 MG Tab, Take 2 Tablets by mouth every evening for 5 days., Disp: 10 Tablet, Rfl: 0    traZODone (DESYREL) 50 MG Tab, Take 50 mg by mouth at bedtime as needed., Disp: , Rfl:     busPIRone (BUSPAR) 5 MG tablet, Take 5 mg by mouth 2 times a day., Disp: , Rfl:     aspirin (ASA) 81 MG Chew Tab chewable tablet, Chew 81 mg every day., Disp: , Rfl:     rosuvastatin (CRESTOR) 20 MG Tab, TAKE 1 TABLET BY MOUTH EVERY DAY IN THE EVENING, Disp: 90 Tablet, Rfl: 4    levothyroxine (SYNTHROID) 100 MCG Tab, Take 1 Tablet by mouth every morning on an empty stomach., Disp: 90 Tablet, Rfl: 1    hydrOXYzine pamoate (VISTARIL) 25 MG Cap, TAKE 1 CAPSULE BY MOUTH DAILY AT BEDTIME AS NEEDED FOR  "ANXIETY., Disp: , Rfl:     hydrOXYzine pamoate (VISTARIL) 50 MG Cap, TAKE 1 CAPSULE BY MOUTH 3 TIMES A DAY AS NEEDED FOR ITCHING OR OTHER (NAUSEA, ANXIETY, INSOMNIA)., Disp: , Rfl:     pregabalin (LYRICA) 225 MG capsule, Take 225 mg by mouth 2 times a day., Disp: , Rfl:     acyclovir (ZOVIRAX) 800 MG Tab, TK 1 T PO BID FOR 10 DAYS, Disp: , Rfl:     FLUoxetine (PROZAC) 20 MG Cap, Take 1 Capsule by mouth every day., Disp: 90 Capsule, Rfl: 1    acetaminophen (TYLENOL) 500 MG Tab, Take 2 tabs up to 3 times a day prn pain, Disp: 60 Tablet, Rfl: 0    vitamin D (CHOLECALCIFEROL) 1000 Unit Tab, TAKE 1 TABLET BY MOUTH EVERY DAY, Disp: 90 Tablet, Rfl: 1    cyclobenzaprine (FLEXERIL) 10 mg Tab, , Disp: , Rfl:     pregabalin (LYRICA) 150 MG Cap, , Disp: , Rfl:     ** I have documented what I find to be significant in regards to past medical, social, family and surgical history  in my HPI or under PMH/PSH/FH review section, otherwise it is noncontributory **           HPI    Review of Systems   Neurological:  Positive for headaches.   All other systems reviewed and are negative.             Objective     /62 (BP Location: Left arm, Patient Position: Sitting, BP Cuff Size: Adult)   Pulse 93   Temp 36.2 °C (97.2 °F) (Temporal)   Resp 16   Ht 1.6 m (5' 3\")   Wt 78.3 kg (172 lb 9.6 oz)   LMP 12/01/2011 (Approximate)   SpO2 99%   BMI 30.57 kg/m²      Physical Exam  Vitals and nursing note reviewed.   Constitutional:       General: She is not in acute distress.     Appearance: Normal appearance. She is well-developed.   HENT:      Head: Normocephalic.   Eyes:      Extraocular Movements: Extraocular movements intact.      Pupils: Pupils are equal, round, and reactive to light.   Neck:      Comments: A little bit of neck tenderness to palpation and over occipital region no rashes noted  Cardiovascular:      Heart sounds: Normal heart sounds. No murmur heard.  Pulmonary:      Effort: Pulmonary effort is normal. No " respiratory distress.      Breath sounds: Normal breath sounds.   Musculoskeletal:      Cervical back: Tenderness present.   Neurological:      General: No focal deficit present.      Mental Status: She is alert and oriented to person, place, and time.      Cranial Nerves: No cranial nerve deficit.      Motor: No weakness or abnormal muscle tone.      Coordination: Coordination normal.   Psychiatric:         Mood and Affect: Mood normal.         Behavior: Behavior normal.                             Assessment & Plan     1. Headache  Referral to Neurology    prochlorperazine (COMPAZINE) 5 MG Tab    predniSONE (DELTASONE) 20 MG Tab      2. COVID-19          Ongoing headaches may be migrainous post COVID could be cervicogenic type headaches.  Will do a trial of 5 days of Compazine and steroid to help possible chronic migraine.  And advised to do PCP and neurology follow-up.  Offered CAT scan of head today as headache has been ongoing wants to hold off for now and try medication first and return if not improving or go to ER    - Dx, plan & d/c instructions discussed   - Rest, stay hydrated  - Neurology # provided   - OTC Tylenol as needed    Follow up with your regular primary care providers office within a week to keep them updated and informed of this visit and for regular routine health maintenance check-ups. ER if not improving in 2-3 days or if feeling/getting worse. (If you do not have a primary care provider and need to schedule one you may call [facility] at [phone] to do this).    Any realistic side effects of medications that may have been given today reviewed.     Patient left in stable condition         Pertinent prior lab work and/or imaging studies in Epic have been reviewed by me today on day of this visit and taken into account for my treatment and plan today    Pertinent PMH/PSH and/or chronic conditions and medications if any were reviewed today and taken into account for my treatment and plan  today    Pertinent prior office visit notes in Epic have been reviewed by me today on day of this visit.    Please note that this dictation may have been created using voice recognition software, if so I have made every reasonable attempt to correct obvious errors, but I expect that there are errors of grammar and possibly content that I did not discover before finalizing the note.     My total time spent caring for the patient on the day of the encounter was 30 minutes.   This does not include time spent on separately billable procedures/tests.

## 2024-03-08 ENCOUNTER — TELEPHONE (OUTPATIENT)
Dept: HEALTH INFORMATION MANAGEMENT | Facility: OTHER | Age: 44
End: 2024-03-08
Payer: COMMERCIAL

## 2024-03-15 ENCOUNTER — APPOINTMENT (OUTPATIENT)
Dept: MEDICAL GROUP | Facility: LAB | Age: 44
End: 2024-03-15
Payer: COMMERCIAL

## 2024-03-19 ENCOUNTER — APPOINTMENT (OUTPATIENT)
Dept: MEDICAL GROUP | Facility: LAB | Age: 44
End: 2024-03-19
Payer: COMMERCIAL

## 2024-03-22 ENCOUNTER — APPOINTMENT (OUTPATIENT)
Dept: MEDICAL GROUP | Facility: LAB | Age: 44
End: 2024-03-22
Payer: COMMERCIAL

## 2024-03-25 ENCOUNTER — APPOINTMENT (OUTPATIENT)
Dept: MEDICAL GROUP | Facility: LAB | Age: 44
End: 2024-03-25
Payer: COMMERCIAL

## 2024-03-26 ENCOUNTER — APPOINTMENT (OUTPATIENT)
Dept: MEDICAL GROUP | Facility: LAB | Age: 44
End: 2024-03-26
Payer: COMMERCIAL

## 2024-03-26 LAB
ALBUMIN SERPL-MCNC: 5.4 G/DL (ref 3.9–4.9)
ALBUMIN/GLOB SERPL: 2 {RATIO} (ref 1.2–2.2)
ALP SERPL-CCNC: 105 IU/L (ref 44–121)
ALT SERPL-CCNC: 88 IU/L (ref 0–32)
APPEARANCE UR: CLEAR
AST SERPL-CCNC: 70 IU/L (ref 0–40)
BACTERIA #/AREA URNS HPF: NORMAL /[HPF]
BASOPHILS # BLD AUTO: 0.1 X10E3/UL (ref 0–0.2)
BASOPHILS NFR BLD AUTO: 1 %
BILIRUB SERPL-MCNC: 0.3 MG/DL (ref 0–1.2)
BILIRUB UR QL STRIP: NEGATIVE
BUN SERPL-MCNC: 7 MG/DL (ref 6–24)
BUN/CREAT SERPL: 7 (ref 9–23)
CALCIUM SERPL-MCNC: 11.1 MG/DL (ref 8.7–10.2)
CASTS URNS MICRO: NORMAL
CASTS URNS QL MICRO: NORMAL /LPF
CHLORIDE SERPL-SCNC: 99 MMOL/L (ref 96–106)
CO2 SERPL-SCNC: 20 MMOL/L (ref 20–29)
COLOR UR: YELLOW
CREAT SERPL-MCNC: 0.95 MG/DL (ref 0.57–1)
CRYSTALS URNS MICRO: NORMAL
EGFRCR SERPLBLD CKD-EPI 2021: 77 ML/MIN/1.73
EOSINOPHIL # BLD AUTO: 0.1 X10E3/UL (ref 0–0.4)
EOSINOPHIL NFR BLD AUTO: 1 %
EPI CELLS #/AREA URNS HPF: NORMAL /HPF (ref 0–10)
ERYTHROCYTE [DISTWIDTH] IN BLOOD BY AUTOMATED COUNT: 13 % (ref 11.7–15.4)
ERYTHROCYTE [SEDIMENTATION RATE] IN BLOOD BY WESTERGREN METHOD: 3 MM/HR (ref 0–32)
GLOBULIN SER CALC-MCNC: 2.7 G/DL (ref 1.5–4.5)
GLUCOSE SERPL-MCNC: 92 MG/DL (ref 70–99)
GLUCOSE UR QL STRIP: NEGATIVE
HCT VFR BLD AUTO: 44.9 % (ref 34–46.6)
HGB BLD-MCNC: 15.1 G/DL (ref 11.1–15.9)
HGB UR QL STRIP: NEGATIVE
IMM GRANULOCYTES # BLD AUTO: 0.1 X10E3/UL (ref 0–0.1)
IMM GRANULOCYTES NFR BLD AUTO: 1 %
IMMATURE CELLS  115398: ABNORMAL
KETONES UR QL STRIP: NEGATIVE
LEUKOCYTE ESTERASE UR QL STRIP: NEGATIVE
LYMPHOCYTES # BLD AUTO: 2 X10E3/UL (ref 0.7–3.1)
LYMPHOCYTES NFR BLD AUTO: 20 %
MCH RBC QN AUTO: 31.8 PG (ref 26.6–33)
MCHC RBC AUTO-ENTMCNC: 33.6 G/DL (ref 31.5–35.7)
MCV RBC AUTO: 95 FL (ref 79–97)
MICRO URNS: NORMAL
MICRO URNS: NORMAL
MONOCYTES # BLD AUTO: 0.6 X10E3/UL (ref 0.1–0.9)
MONOCYTES NFR BLD AUTO: 5 %
MORPHOLOGY BLD-IMP: ABNORMAL
MUCOUS THREADS URNS QL MICRO: NORMAL
NEUTROPHILS # BLD AUTO: 7.6 X10E3/UL (ref 1.4–7)
NEUTROPHILS NFR BLD AUTO: 72 %
NITRITE UR QL STRIP: NEGATIVE
NRBC BLD AUTO-RTO: ABNORMAL %
PH UR STRIP: 6.5 [PH] (ref 5–7.5)
PLATELET # BLD AUTO: 386 X10E3/UL (ref 150–450)
POTASSIUM SERPL-SCNC: 4.6 MMOL/L (ref 3.5–5.2)
PROT SERPL-MCNC: 8.1 G/DL (ref 6–8.5)
PROT UR QL STRIP: NEGATIVE
RBC # BLD AUTO: 4.75 X10E6/UL (ref 3.77–5.28)
RBC #/AREA URNS HPF: NORMAL /HPF (ref 0–2)
RENAL EPI CELLS #/AREA URNS HPF: NORMAL /[HPF]
SODIUM SERPL-SCNC: 139 MMOL/L (ref 134–144)
SP GR UR STRIP: 1.01 (ref 1–1.03)
T VAGINALIS URNS QL MICRO: NORMAL
T4 FREE SERPL-MCNC: 1.4 NG/DL (ref 0.82–1.77)
TSH SERPL DL<=0.005 MIU/L-ACNC: 0.29 UIU/ML (ref 0.45–4.5)
UNIDENT CRYS URNS QL MICRO: NORMAL
URINALYSIS REFLEX 377201: NORMAL
URNS CMNT MICRO: NORMAL
UROBILINOGEN UR STRIP-MCNC: 0.2 MG/DL (ref 0.2–1)
WBC # BLD AUTO: 10.3 X10E3/UL (ref 3.4–10.8)
WBC #/AREA URNS HPF: NORMAL /HPF (ref 0–5)
YEAST #/AREA URNS HPF: NORMAL /[HPF]

## 2024-03-28 DIAGNOSIS — E05.90 HYPERTHYROIDISM: ICD-10-CM

## 2024-03-28 DIAGNOSIS — R79.89 ABNORMAL THYROID BLOOD TEST: ICD-10-CM

## 2024-04-15 ENCOUNTER — HOSPITAL ENCOUNTER (OUTPATIENT)
Dept: RADIOLOGY | Facility: MEDICAL CENTER | Age: 44
End: 2024-04-15
Attending: FAMILY MEDICINE
Payer: COMMERCIAL

## 2024-04-15 DIAGNOSIS — R79.89 ABNORMAL THYROID BLOOD TEST: ICD-10-CM

## 2024-04-15 PROCEDURE — 76536 US EXAM OF HEAD AND NECK: CPT

## 2024-04-19 ENCOUNTER — OFFICE VISIT (OUTPATIENT)
Dept: MEDICAL GROUP | Facility: LAB | Age: 44
End: 2024-04-19
Payer: COMMERCIAL

## 2024-04-19 ENCOUNTER — APPOINTMENT (OUTPATIENT)
Dept: MEDICAL GROUP | Facility: LAB | Age: 44
End: 2024-04-19
Payer: COMMERCIAL

## 2024-04-19 VITALS
TEMPERATURE: 98 F | HEIGHT: 63 IN | BODY MASS INDEX: 32.03 KG/M2 | RESPIRATION RATE: 16 BRPM | SYSTOLIC BLOOD PRESSURE: 112 MMHG | WEIGHT: 180.78 LBS | DIASTOLIC BLOOD PRESSURE: 80 MMHG | HEART RATE: 90 BPM | OXYGEN SATURATION: 96 %

## 2024-04-19 DIAGNOSIS — R74.01 TRANSAMINITIS: ICD-10-CM

## 2024-04-19 DIAGNOSIS — F41.9 ANXIETY: ICD-10-CM

## 2024-04-19 DIAGNOSIS — E83.52 HYPERCALCEMIA: ICD-10-CM

## 2024-04-19 PROCEDURE — 99214 OFFICE O/P EST MOD 30 MIN: CPT | Performed by: FAMILY MEDICINE

## 2024-04-19 PROCEDURE — 3074F SYST BP LT 130 MM HG: CPT | Performed by: FAMILY MEDICINE

## 2024-04-19 PROCEDURE — 3079F DIAST BP 80-89 MM HG: CPT | Performed by: FAMILY MEDICINE

## 2024-04-19 RX ORDER — FLUOXETINE HYDROCHLORIDE 20 MG/1
20 CAPSULE ORAL DAILY
Qty: 90 CAPSULE | Refills: 1 | Status: SHIPPED | OUTPATIENT
Start: 2024-04-19

## 2024-04-19 RX ORDER — TRAZODONE HYDROCHLORIDE 100 MG/1
100 TABLET ORAL NIGHTLY
Qty: 30 TABLET | Refills: 3 | Status: SHIPPED | OUTPATIENT
Start: 2024-04-19

## 2024-04-19 RX ORDER — BUSPIRONE HYDROCHLORIDE 5 MG/1
5 TABLET ORAL 2 TIMES DAILY
Qty: 90 TABLET | Refills: 1 | Status: SHIPPED | OUTPATIENT
Start: 2024-04-19

## 2024-04-19 ASSESSMENT — FIBROSIS 4 INDEX: FIB4 SCORE: 0.81

## 2024-04-19 NOTE — PROGRESS NOTES
Chief Complaint   Patient presents with    Lab Results       Verbal consent was acquired by the patient to use [employer] ambient listening note generation during this visit Yes      HPI:  History of Present Illness  The patient is a 44-year-old female who presents for evaluation of multiple medical concerns.    The patient expresses a desire to discuss the results of her recent blood work. She recently sought urgent care for a migraine, which has since resolved. She was informed that the migraine was likely a symptom post-COVID-19 infection.      Elevated Liver Enzymes :      The patient denies excessive use of Tylenol and has not undergone a recent liver ultrasound. She reports difficulty in losing weight and daily water retention. Despite attending the gym 5 to 6 days a week for an hour, she participates in spin class, cardio for 55 minutes, and light lifting exercises. She consumed a glass of red wine on her birthday the previous night, which resulted in water retention. Her daily fluid intake includes water with lemon, arnulfo, and lemon tea. She is interested in pharmacological intervention for weight loss.       Elevated Calcium Levels :   elevated calcium levels, accompanied by muscle cramps. Over the past few weeks, she has been experiencing bone pain, reminiscent of a previous foot fracture. She denies any falls. She also experiences pain in her shins and feet. A parathyroid work-up revealed a couple of nodules, but subsequent tests returned normal results. She also reports difficulty swallowing, describing a sensation of an obstruction in her throat. Her gastroenterologist noted mild swollen lymph nodes in her right and left lymph nodes, but nothing significant. She experiences chronic abdominal pain, which she attributes to scar tissue.  Patient is scheduled to establish with endocrinology for evaluation of her thyroid disease and to address the possible parathyroid disease, scheduled on May 1, 2024 in 2  "weeks.     anxiety and depression:  The patient plans to transition to a different behavioral therapist, as her current therapist does not engage with her until 06/2024. Her current medication regimen includes buspirone, Prozac, and trazodone 100 mg at night to aid sleep. She requests a refill of her fluoxetine 20 mg prescription.   She is not allergic to any medications.  Has been using these medications without problems.              Exam:   /80 (BP Location: Left arm, Patient Position: Sitting, BP Cuff Size: Adult)   Pulse 90   Temp 36.7 °C (98 °F) (Temporal)   Resp 16   Ht 1.6 m (5' 3\")   Wt 82 kg (180 lb 12.4 oz)   LMP 12/01/2011 (Approximate)   SpO2 96%   BMI 32.02 kg/m²         /80 (BP Location: Left arm, Patient Position: Sitting, BP Cuff Size: Adult)   Pulse 90   Temp 36.7 °C (98 °F) (Temporal)   Resp 16   Ht 1.6 m (5' 3\")   Wt 82 kg (180 lb 12.4 oz)   LMP 12/01/2011 (Approximate)   SpO2 96%   BMI 32.02 kg/m²   Gen.: Well-developed, well-nourished, no apparent distress, pleasant and cooperative with the examination  HEENT: Normocephalic/atraumatic,     Neck: No JVD or bruits, no adenopathy  Cor: Regular rate and rhythm without murmur gallop or rub  Lungs: Clear to auscultation with equal breath sounds bilaterally. No wheezes, rhonchi.  Abdomen: Soft nontender without hepatosplenomegaly or masses appreciated, normoactive bowel sounds  Extremities: No cyanosis, clubbing or edema         Assessment & Plan  1. Elevated liver enzymes.  The patient's elevated liver enzymes may be attributed to fat accumulation around the liver. A hepatitis work-up will be ordered. The patient is advised to abstain from alcohol and Tylenol. A right upper quadrant ultrasound will be performed in the future.    2. Weight management.  The patient was educated about lifestyle modifications, including calorie deficiency, a high-protein diet, low-carbohydrate, high-fiber diet, high water intake, and exercise. " The possibility of medication was discussed. An initial weight management visit will be scheduled.    3. Hypercalcemia.  This is the first instance of abnormal calcium levels, potentially due to an error from the lab. Ionized calcium test will be ordered. The patient will continue to follow up with her endocrinologist. Parathyroid scan may be considered.    4. Sleep issues./Anxiety and depression:  Follow-up with a new provider 3 medications reordered and sent to her pharmacy, no red flag symptoms or suicidal ideations.  The patient's trazodone dosage has been increased to 100 mg.      Please note that this dictation was created using voice recognition software. I have worked with consultants from the vendor as well as technical experts from [facility] to optimize the interface. I have made every reasonable attempt to correct obvious errors, but I expect that there are errors of grammar and possibly content that I did not discover before finalizing the note.

## 2024-04-22 ENCOUNTER — TELEPHONE (OUTPATIENT)
Dept: MEDICAL GROUP | Facility: LAB | Age: 44
End: 2024-04-22
Payer: COMMERCIAL

## 2024-04-22 DIAGNOSIS — R74.01 TRANSAMINITIS: ICD-10-CM

## 2024-04-22 NOTE — TELEPHONE ENCOUNTER
1. Caller Name: Kathleen Alvarado                         Call Back Number: [phone]       How would the patient prefer to be contacted with a response: [employer] message    2. SPECIFIC Action To Be Taken: Referral pending, please sign.    Pt requested referral via Retrieve.    3. Diagnosis/Clinical Reason for Request: Liver issues.    4. Specialty & Provider Name/Lab/Imaging Location: [facility]    5. Is appointment scheduled for requested order/referral: no    Patient was informed they will receive a return phone call from the office ONLY if there are any questions before processing their request. Advised to call back if they haven't received a call from the referral department in 5 days.

## 2024-04-24 NOTE — TELEPHONE ENCOUNTER
Received request via: Pharmacy    Was the patient seen in the last year in this department? Yes    Does the patient have an active prescription (recently filled or refills available) for medication(s) requested? No    Pharmacy Name: [facility]     Does the patient have FDC Plus and need 100 day supply (blood pressure, diabetes and cholesterol meds only)? Patient does not have SCP

## 2024-04-25 RX ORDER — LEVOTHYROXINE SODIUM 0.1 MG/1
100 TABLET ORAL
Qty: 90 TABLET | Refills: 1 | Status: SHIPPED | OUTPATIENT
Start: 2024-04-25

## 2024-04-29 LAB
CA-I SERPL ISE-MCNC: 5.2 MG/DL (ref 4.5–5.6)
CALCIUM SERPL-MCNC: 9.8 MG/DL (ref 8.7–10.2)
HBV CORE AB SERPL QL IA: NEGATIVE
HBV SURFACE AB SER QL: REACTIVE
HBV SURFACE AG SERPL QL IA: NEGATIVE
HCV IGG SERPL QL IA: NON REACTIVE
INTACT PTH   004000: NORMAL
PTH-INTACT SERPL-MCNC: 26 PG/ML (ref 15–65)

## 2024-05-02 ENCOUNTER — APPOINTMENT (OUTPATIENT)
Dept: RADIOLOGY | Facility: MEDICAL CENTER | Age: 44
End: 2024-05-02
Attending: FAMILY MEDICINE
Payer: COMMERCIAL

## 2024-05-15 LAB
ALBUMIN SERPL-MCNC: NORMAL G/DL (ref 3.9–4.9)
ALBUMIN/GLOB SERPL: NORMAL {RATIO} (ref 1.2–2.2)
ALP SERPL-CCNC: NORMAL IU/L (ref 44–121)
ALT SERPL-CCNC: NORMAL IU/L (ref 0–32)
APPEARANCE UR: CLEAR
AST SERPL-CCNC: NORMAL IU/L (ref 0–40)
BACTERIA #/AREA URNS HPF: NORMAL /[HPF]
BASOPHILS # BLD AUTO: 0.1 X10E3/UL (ref 0–0.2)
BASOPHILS NFR BLD AUTO: 1 %
BILIRUB SERPL-MCNC: NORMAL MG/DL (ref 0–1.2)
BILIRUB UR QL STRIP: NEGATIVE
BUN SERPL-MCNC: NORMAL MG/DL (ref 6–24)
BUN/CREAT SERPL: NORMAL (ref 9–23)
CALCIUM SERPL-MCNC: NORMAL MG/DL (ref 8.7–10.2)
CASTS URNS QL MICRO: NORMAL /LPF
CHLORIDE SERPL-SCNC: 99 MMOL/L (ref 96–106)
CO2 SERPL-SCNC: NORMAL MMOL/L (ref 20–29)
COLOR UR: YELLOW
CREAT SERPL-MCNC: NORMAL MG/DL (ref 0.57–1)
EGFRCR SERPLBLD CKD-EPI 2021: NORMAL ML/MIN/1.73
EOSINOPHIL # BLD AUTO: 0.1 X10E3/UL (ref 0–0.4)
EOSINOPHIL NFR BLD AUTO: 1 %
EPI CELLS #/AREA URNS HPF: NORMAL /HPF (ref 0–10)
ERYTHROCYTE [DISTWIDTH] IN BLOOD BY AUTOMATED COUNT: 13 % (ref 11.7–15.4)
ERYTHROCYTE [SEDIMENTATION RATE] IN BLOOD BY WESTERGREN METHOD: 3 MM/HR (ref 0–32)
GLOBULIN SER CALC-MCNC: NORMAL G/DL (ref 1.5–4.5)
GLUCOSE SERPL-MCNC: NORMAL MG/DL (ref 70–99)
GLUCOSE UR QL STRIP: NEGATIVE
HCT VFR BLD AUTO: 44.9 % (ref 34–46.6)
HGB BLD-MCNC: 15.1 G/DL (ref 11.1–15.9)
HGB UR QL STRIP: NEGATIVE
IMM GRANULOCYTES # BLD AUTO: 0.1 X10E3/UL (ref 0–0.1)
IMM GRANULOCYTES NFR BLD AUTO: 1 %
KETONES UR QL STRIP: NEGATIVE
LEUKOCYTE ESTERASE UR QL STRIP: NEGATIVE
LYMPHOCYTES # BLD AUTO: 2 X10E3/UL (ref 0.7–3.1)
LYMPHOCYTES NFR BLD AUTO: 20 %
MCH RBC QN AUTO: 31.8 PG (ref 26.6–33)
MCHC RBC AUTO-ENTMCNC: 33.6 G/DL (ref 31.5–35.7)
MCV RBC AUTO: 95 FL (ref 79–97)
MICRO URNS: NORMAL
MICRO URNS: NORMAL
MONOCYTES # BLD AUTO: 0.6 X10E3/UL (ref 0.1–0.9)
MONOCYTES NFR BLD AUTO: 5 %
NEUTROPHILS # BLD AUTO: 7.6 X10E3/UL (ref 1.4–7)
NEUTROPHILS NFR BLD AUTO: 72 %
NITRITE UR QL STRIP: NEGATIVE
PH UR STRIP: 6.5 [PH] (ref 5–7.5)
PLATELET # BLD AUTO: 386 X10E3/UL (ref 150–450)
POTASSIUM SERPL-SCNC: 4.6 MMOL/L (ref 3.5–5.2)
PROT SERPL-MCNC: NORMAL G/DL (ref 6–8.5)
PROT UR QL STRIP: NEGATIVE
RBC # BLD AUTO: 4.75 X10E6/UL (ref 3.77–5.28)
RBC #/AREA URNS HPF: NORMAL /HPF (ref 0–2)
SODIUM SERPL-SCNC: 139 MMOL/L (ref 134–144)
SP GR UR STRIP: 1.01 (ref 1–1.03)
T4 FREE SERPL-MCNC: 1.4 NG/DL (ref 0.82–1.77)
TSH SERPL DL<=0.005 MIU/L-ACNC: 0.29 UIU/ML (ref 0.45–4.5)
URINALYSIS REFLEX 377201: NORMAL
UROBILINOGEN UR STRIP-MCNC: 0.2 MG/DL (ref 0.2–1)
WBC # BLD AUTO: 10.3 X10E3/UL (ref 3.4–10.8)
WBC #/AREA URNS HPF: NORMAL /HPF (ref 0–5)

## 2024-05-22 ENCOUNTER — APPOINTMENT (OUTPATIENT)
Dept: RADIOLOGY | Facility: MEDICAL CENTER | Age: 44
End: 2024-05-22
Attending: FAMILY MEDICINE
Payer: COMMERCIAL

## 2024-06-24 ENCOUNTER — OFFICE VISIT (OUTPATIENT)
Dept: URGENT CARE | Facility: CLINIC | Age: 44
End: 2024-06-24
Payer: COMMERCIAL

## 2024-06-24 VITALS
WEIGHT: 159 LBS | BODY MASS INDEX: 29.26 KG/M2 | HEIGHT: 62 IN | DIASTOLIC BLOOD PRESSURE: 72 MMHG | OXYGEN SATURATION: 97 % | TEMPERATURE: 97.5 F | RESPIRATION RATE: 16 BRPM | HEART RATE: 85 BPM | SYSTOLIC BLOOD PRESSURE: 94 MMHG

## 2024-06-24 DIAGNOSIS — R50.9 FEVER, UNSPECIFIED FEVER CAUSE: ICD-10-CM

## 2024-06-24 DIAGNOSIS — J45.21 MILD INTERMITTENT REACTIVE AIRWAY DISEASE WITH ACUTE EXACERBATION: ICD-10-CM

## 2024-06-24 DIAGNOSIS — M54.9 UPPER BACK PAIN: ICD-10-CM

## 2024-06-24 DIAGNOSIS — J06.9 UPPER RESPIRATORY TRACT INFECTION, UNSPECIFIED TYPE: ICD-10-CM

## 2024-06-24 DIAGNOSIS — R06.02 SOB (SHORTNESS OF BREATH): ICD-10-CM

## 2024-06-24 LAB
FLUAV RNA SPEC QL NAA+PROBE: NEGATIVE
FLUBV RNA SPEC QL NAA+PROBE: NEGATIVE
RSV RNA SPEC QL NAA+PROBE: NEGATIVE
SARS-COV-2 RNA RESP QL NAA+PROBE: NEGATIVE

## 2024-06-24 PROCEDURE — 94640 AIRWAY INHALATION TREATMENT: CPT

## 2024-06-24 PROCEDURE — 0241U POCT CEPHEID COV-2, FLU A/B, RSV - PCR: CPT

## 2024-06-24 PROCEDURE — 3078F DIAST BP <80 MM HG: CPT

## 2024-06-24 PROCEDURE — 99214 OFFICE O/P EST MOD 30 MIN: CPT | Mod: 25

## 2024-06-24 PROCEDURE — 93000 ELECTROCARDIOGRAM COMPLETE: CPT

## 2024-06-24 PROCEDURE — 3074F SYST BP LT 130 MM HG: CPT

## 2024-06-24 RX ORDER — METHYLPREDNISOLONE 4 MG/1
TABLET ORAL
Qty: 21 TABLET | Refills: 0 | Status: SHIPPED | OUTPATIENT
Start: 2024-06-24

## 2024-06-24 RX ORDER — ALBUTEROL SULFATE 2.5 MG/3ML
2.5 SOLUTION RESPIRATORY (INHALATION) ONCE
Status: COMPLETED | OUTPATIENT
Start: 2024-06-24 | End: 2024-06-24

## 2024-06-24 RX ORDER — ONDANSETRON 8 MG/1
TABLET, ORALLY DISINTEGRATING ORAL
COMMUNITY
Start: 2024-06-12 | End: 2024-06-24

## 2024-06-24 RX ORDER — ALBUTEROL SULFATE 90 UG/1
2 AEROSOL, METERED RESPIRATORY (INHALATION) EVERY 6 HOURS PRN
Qty: 8.5 G | Refills: 0 | Status: SHIPPED | OUTPATIENT
Start: 2024-06-24

## 2024-06-24 RX ADMIN — ALBUTEROL SULFATE 2.5 MG: 2.5 SOLUTION RESPIRATORY (INHALATION) at 10:13

## 2024-06-24 NOTE — PROGRESS NOTES
Subjective:   Kathleen Alvarado is a 44 y.o. female who presents for Back Pain (Mid back pain, chest congestion, no energy, lungs hurt to breath x 5 days)      HPI:    Patient presents to urgent care with concerns of SOB, burning sensation in her chest when she breathes and lightheadedness. Symptoms started 4-5 days ago. Also reports hoarse voice. States she has been taking an allergy pill without significant improvement. Endorses fever (T max 102 F) on Saturday controlled with IBU. Also experiencing chills and body aches.  Decreased appetite, tolerating fluids  Green nasal secretions. Cough is intermittently productive of clear mucus.   Denies pmh of asthma, copd, smoking. But has had to use an inhaler in the past when she is sick. Denies abdominal pain. Denies n/v/d.     Also reports 4-6 weeks of upper back pain. She localizes the pain to her thoracic back   Denies history of traumatic injuries. Rates pain as 4/10. Pain is alleviated with  Denies numbness/tingling, weakness, radiation of pain.     Notes her BP to be lower in office today.     ROS As above in HPI    Medications:    Current Outpatient Medications on File Prior to Visit   Medication Sig Dispense Refill    busPIRone (BUSPAR) 5 MG tablet Take 1 Tablet by mouth 2 times a day.      Vitamin D, Cholecalciferol, (CHOLECALCIFEROL) 25 MCG (1000 UT) Tab Take 1 Tablet by mouth every day.      cyclobenzaprine (FLEXERIL) 10 mg Tab 1 tablet at bedtime as needed Orally Once a day      FLUoxetine (PROZAC) 20 MG Cap 1 Capsule.      levothyroxine (SYNTHROID) 100 MCG Tab 1 tablet in the morning on an empty stomach Orally Once a day      pregabalin (LYRICA) 225 MG capsule 1 capsule in the evening 1 to 3 hours before bedtime Orally Once a day      rosuvastatin (CRESTOR) 20 MG Tab Take 1 Tablet by mouth every day.      traZODone (DESYREL) 100 MG Tab 1 tablet at bedtime Orally Once a day      levothyroxine (SYNTHROID) 100 MCG Tab TAKE 1 TABLET BY MOUTH EVERY DAY IN THE  MORNING ON AN EMPTY STOMACH 90 Tablet 1    busPIRone (BUSPAR) 5 MG tablet Take 1 Tablet by mouth 2 times a day. 90 Tablet 1    FLUoxetine (PROZAC) 20 MG Cap Take 1 Capsule by mouth every day. 90 Capsule 1    traZODone (DESYREL) 100 MG Tab Take 1 Tablet by mouth every evening. 30 Tablet 3    traZODone (DESYREL) 50 MG Tab Take 50 mg by mouth at bedtime as needed.      rosuvastatin (CRESTOR) 20 MG Tab TAKE 1 TABLET BY MOUTH EVERY DAY IN THE EVENING 90 Tablet 4    hydrOXYzine pamoate (VISTARIL) 25 MG Cap TAKE 1 CAPSULE BY MOUTH DAILY AT BEDTIME AS NEEDED FOR ANXIETY.      hydrOXYzine pamoate (VISTARIL) 50 MG Cap TAKE 1 CAPSULE BY MOUTH 3 TIMES A DAY AS NEEDED FOR ITCHING OR OTHER (NAUSEA, ANXIETY, INSOMNIA).      pregabalin (LYRICA) 225 MG capsule Take 225 mg by mouth 2 times a day.      acetaminophen (TYLENOL) 500 MG Tab Take 2 tabs up to 3 times a day prn pain 60 Tablet 0    vitamin D (CHOLECALCIFEROL) 1000 Unit Tab TAKE 1 TABLET BY MOUTH EVERY DAY 90 Tablet 1    cyclobenzaprine (FLEXERIL) 10 mg Tab       pregabalin (LYRICA) 150 MG Cap        No current facility-administered medications on file prior to visit.        Allergies:   Clindamycin, Morphine, Norco [apap-fd&c blue #1-hydrocodone], Tramadol, Demerol, Latex, Other misc, Sulfa drugs, Tape, Trace metals, Levaquin, Levofloxacin, Seasonal, Triamcinolone, and Trazodone    Problem List:   Patient Active Problem List   Diagnosis    Chronic back pain    Seasonal allergies    Acne    Hypothyroidism    Narcotic dependence (HCC)    Depression    Gastroparesis    Vitamin D deficiency    Abdominal pain    Fatigue    Migraine    Hormone replacement therapy    History of TIA (transient ischemic attack)    Post concussion syndrome    Insomnia due to medical condition    Generalized anxiety disorder    Hepatic lesion    Abdominal pain with vomiting and history of abdominal surgery    Melena    Postural dizziness with presyncope    Screening for breast cancer    Ankle  instability, right    Encounter to establish care with new doctor        Surgical History:  Past Surgical History:   Procedure Laterality Date    PB REPAIR COLLAT ANKLE LIGMNT,SECONDARY Right 5/17/2023    Procedure: RIGHT ANKLE ARTHROSCOPY, RIGHT MEDIAL AND LATERAL LIGAMENT RECONSTRUCTION;  Surgeon: García Wallace M.D.;  Location: [facility];  Service: Orthopedics    PB RELEASE TIB/FIB/ANKLE FLEX TENDON,EA Right 5/17/2023    Procedure: EVALUATE RIGHT PERONEAL TENDON;  Surgeon: García Wallace M.D.;  Location: [facility];  Service: Orthopedics    PB PART REMV TALUS OR CALCANEUS Right 5/17/2023    Procedure: RIGHT EXCISION ANTERIOR PROCESS CALCANEUS, REPAIRS AS INDICATED;  Surgeon: García Wallace M.D.;  Location: [facility];  Service: Orthopedics    MN LAP,DIAGNOSTIC ABDOMEN  8/12/2020    Procedure: LAPAROSCOPY- DIAGNOSTIC;  Surgeon: Heron Reyes M.D.;  Location: [facility];  Service: General    LAPAROSCOPIC LYSIS OF ADHESIONS  8/12/2020    Procedure: LYSIS, ADHESIONS, LAPAROSCOPIC;  Surgeon: Heron Reyes M.D.;  Location: [facility];  Service: General    BREAST IMPLANT REVISION Left 7/24/2019    Procedure: INSERTION, IMPLANT, BREAST- EXCHANGE SILICONE;  Surgeon: Bg Dumont M.D.;  Location: [facility];  Service: Plastics    CAPSULECTOMY N/A 7/24/2019    Procedure: CAPSULECTOMY;  Surgeon: Bg Dumont M.D.;  Location: [facility];  Service: Plastics    BREAST RECONSTRUCTION N/A 7/24/2019    Procedure: RECONSTRUCTION, BREAST - PLACEMENT OF STRATTICE, REVISION OF RIGHT FULL BREAST LIFT AND STRACTTICE REINFORCEMENT;  Surgeon: Bg Dumont M.D.;  Location: [facility];  Service: Plastics    MN SUSPENSION OF BREAST Bilateral 5/13/2019    Procedure: MASTOPEXY - LOWER W/REPOSITIONING OF LATERAL FOLDS;  Surgeon: Bg Dumont M.D.;  Location: [facility];  Service:  Plastics    BREAST IMPLANT REVISION Bilateral 5/13/2019    Procedure: INSERTION, IMPLANT, BREAST - EXCHANGE SILICONE;  Surgeon: Bg Dumont M.D.;  Location: [facility];  Service: Plastics    CAPSULECTOMY Right 5/13/2019    Procedure: CAPSULECTOMY;  Surgeon: Bg Dumont M.D.;  Location: [facility];  Service: Plastics    LOW ANTERIOR RESECTION ROBOTIC XI N/A 3/14/2018    Procedure: LOW ANTERIOR RESECTION ROBOTIC XI- FOR SUBTOTAL COLECTOMY W/LOW ANTERIOR ANASTOMOSIS;  Surgeon: Tigre Javed M.D.;  Location: [facility];  Service: General    INGUINAL HERNIA REPAIR ROBOTIC Left 9/27/2017    Procedure: INGUINAL HERNIA REPAIR ROBOTIC RECURRENT;  Surgeon: Tigre Javed M.D.;  Location: [facility];  Service: Gen Robotic    GROIN EXPLORATION Left 8/30/2016    Procedure: GROIN EXPLORATION;  Surgeon: Tigre Javed M.D.;  Location: [facility];  Service:     VENTRAL HERNIA REPAIR  8/30/2016    Procedure: VENTRAL HERNIA REPAIR ;  Surgeon: Tigre Javed M.D.;  Location: [facility];  Service:     COLON RESECTION ROBOTIC Right 12/7/2015    Procedure: COLON RESECTION ROBOTIC for: Hemicolectomy;  Surgeon: Tigre Javed M.D.;  Location: [facility];  Service:     LAPAROSCOPY ROBOTIC XI  9/1/2015    Procedure: LAPAROSCOPY ROBOTIC XI FOR: ENTEROLYSIS AND METAL CLIP REMOVAL;  Surgeon: Tigre Javed M.D.;  Location: [facility];  Service:     LAPAROSCOPY  1/15/2014    Performed by Heron Reyes M.D. at SURGERY [facility]    PELVISCOPY  4/3/2013    Performed by Genaro Hadley M.D. at SURGERY SAME DAY [facility]    BREAST IMPLANT REVISION  10/11/2011    Performed by ANA DUMONT at SURGERY [facility]    MASTOPEXY  10/11/2011    Performed by ANA DUMONT at [facility]    SCAR REVISION  10/11/2011    Performed by ANA DUMONT at [facility]    ABDOMINOPLASTY  4/28/2011  "   Performed by ANA ROBERTS at SURGERY [facility]    LIPOSUCTION  4/28/2011    Performed by ANA ROBERTS at SURGERY [facility]    VAGINAL HYSTERECTOMY TOTAL  4/4/2011    Performed by TRISTIN LOPEZ at SURGERY SAME DAY [facility]    CYSTOSCOPY  4/4/2011    Performed by TRISTIN LOPEZ at SURGERY SAME DAY [facility]    PRIMARY C SECTION  1/13/2011    Performed by VIET CISNEROS at LABOR AND DELIVERY    ANA BY LAPAROSCOPY  6/6/2009    Performed by LOUIS FREEMAN at SURGERY [facility]    CARPAL TUNNEL RELEASE  3/24/2009    Performed by MARSHA ALCANTARA at SURGERY SAME DAY [facility]    PELVISCOPY  9/3/08    Performed by TAWNY TOBAR at SURGERY SAME DAY [facility]    LYSIS ADHESIONS GYN  9/3/08    Performed by TAWNY TOBAR at SURGERY SAME DAY [facility]    HYSTEROSCOPY WITH VIDEO OPERATIVE  4/7/08    Performed by TAWNY TOBAR at SURGERY [facility]    LAPAROSCOPY  11/15/2007    at [facility] out pt clinic seven laparoscopies (for enhdometriosis)    CO CHOLECYSTOENTEROSTOMY  2002    TONSILLECTOMY  2000    MAMMOPLASTY AUGMENTATION Bilateral 2000    HYSTERECTOMY, TOTAL ABDOMINAL         Past Social Hx:   Social History     Tobacco Use    Smoking status: Never    Smokeless tobacco: Never    Tobacco comments:     exposed to secondhand smoke in childhood   Vaping Use    Vaping status: Never Used   Substance Use Topics    Alcohol use: Not Currently     Alcohol/week: 1.8 oz     Types: 3 Glasses of wine per week    Drug use: No          Problem list, medications, and allergies reviewed by myself today in Epic.     Objective:     BP 94/72 (BP Location: Left arm, Patient Position: Sitting, BP Cuff Size: Adult)   Pulse 85   Temp 36.4 °C (97.5 °F) (Temporal)   Resp 16   Ht 1.575 m (5' 2\")   Wt 72.1 kg (159 lb)   LMP 12/01/2011 (Approximate)   SpO2 97%   BMI 29.08 kg/m²     Physical Exam  Vitals and nursing note reviewed.   Constitutional:       General: She is not in " acute distress.     Appearance: Normal appearance. She is not ill-appearing.   HENT:      Head: Normocephalic.      Right Ear: Tympanic membrane and ear canal normal.      Left Ear: Tympanic membrane and ear canal normal.      Nose: Congestion and rhinorrhea present. Rhinorrhea is clear.      Right Sinus: No maxillary sinus tenderness or frontal sinus tenderness.      Left Sinus: No maxillary sinus tenderness or frontal sinus tenderness.      Mouth/Throat:      Mouth: Mucous membranes are moist.      Pharynx: Oropharynx is clear. Uvula midline. Posterior oropharyngeal erythema (Mild PND) present. No pharyngeal swelling, oropharyngeal exudate or uvula swelling.      Tonsils: 0 on the right. 0 on the left.   Cardiovascular:      Rate and Rhythm: Regular rhythm. Tachycardia present.      Heart sounds: Normal heart sounds. No murmur heard.     No friction rub. No gallop.   Pulmonary:      Effort: Pulmonary effort is normal. No respiratory distress.      Breath sounds: Normal breath sounds. No stridor. No wheezing, rhonchi or rales.      Comments: Lightheaded with deep breathing  Chest:      Chest wall: No tenderness.   Abdominal:      General: Bowel sounds are normal.      Palpations: Abdomen is soft.   Musculoskeletal:      Cervical back: No rigidity or tenderness.   Lymphadenopathy:      Cervical: Cervical adenopathy present.   Skin:     General: Skin is warm and dry.      Capillary Refill: Capillary refill takes less than 2 seconds.      Findings: No rash.   Neurological:      Mental Status: She is alert and oriented to person, place, and time.         Assessment/Plan:     Results for orders placed or performed in visit on 06/24/24   POCT CoV-2, Flu A/B, RSV by PCR   Result Value Ref Range    SARS-CoV-2 by PCR Negative Negative, Invalid    Influenza virus A RNA Negative Negative, Invalid    Influenza virus B, PCR Negative Negative, Invalid    RSV, PCR Negative Negative, Invalid       Diagnosis and associated orders:    1. Upper respiratory tract infection, unspecified type    2. Mild intermittent reactive airway disease with acute exacerbation  - albuterol 108 (90 Base) MCG/ACT Aero Soln inhalation aerosol; Inhale 2 Puffs every 6 hours as needed for Shortness of Breath.  Dispense: 8.5 g; Refill: 0  - methylPREDNISolone (MEDROL DOSEPAK) 4 MG Tablet Therapy Pack; Follow schedule on package instructions.  Dispense: 21 Tablet; Refill: 0    3. Fever, unspecified fever cause  - POCT CoV-2, Flu A/B, RSV by PCR    4. SOB (shortness of breath)  - EKG - Clinic Performed  - albuterol (Proventil) 2.5mg/3ml nebulizer solution 2.5 mg    5. Upper back pain      Comments/MDM:       Patient tested negative for covid, influenza, rsv   EKG obtained due to reports of SOB. She is tachycardic in office.  Patient is in sinus rhythm, no ectopy no ST changes.  Heart rate 85.  Wheezing is not present, however due to shortness of breath, albuterol administered in office.  She tolerated well.  She reports alleviation of shortness of breath, chest tightness after albuterol treatment.  Unfortunately chest x-ray is not available from this urgent care site today.  She declined imaging today. After albuterol treatment, lungs are clear to auscultation all lobes bilaterally after treatment.  SpO2 is 98%.  Will order albuterol and prednisone for patient to use.   Supportive measures encouraged: Rest, increased oral hydration, NSAIDs/tylenol as needed per package instructions, warm humidification, otc antihistamines, Flonase, cough suppressant as needed   She notes that her blood pressure is lower in office than usual.  Will have her monitor blood pressure,  and follow with primary care.   Back pain appears to be musculoskeletal in origin.  It is reproducible with palpation and movement.  She tends to be tender from the trapezius muscles down the thoracic paraspinal column.  Advised ibuprofen,/Tylenol, warm compresses, gentle stretching exercises.  Back pain may be  exacerbated by concurrent URI.  Follow-up with primary care advised       Return to clinic or go to ED if symptoms worsen or persist. Indications for ED discussed at length. Patient/Parent/Guardian voices understanding. Follow-up with your primary care provider in 3-5 days. Red flag symptoms discussed. All side effects of medication discussed including allergic response, GI upset, tendon injury, rash, sedation etc.    Please note that this dictation was created using voice recognition software. I have made a reasonable attempt to correct obvious errors, but I expect that there are errors of grammar and possibly content that I did not discover before finalizing the note.    This note was electronically signed by ANA Pastrana

## 2024-06-27 ENCOUNTER — OFFICE VISIT (OUTPATIENT)
Dept: MEDICAL GROUP | Facility: LAB | Age: 44
End: 2024-06-27
Payer: COMMERCIAL

## 2024-06-27 ENCOUNTER — HOSPITAL ENCOUNTER (OUTPATIENT)
Dept: RADIOLOGY | Facility: MEDICAL CENTER | Age: 44
End: 2024-06-27
Attending: FAMILY MEDICINE
Payer: COMMERCIAL

## 2024-06-27 VITALS
OXYGEN SATURATION: 98 % | HEIGHT: 62 IN | DIASTOLIC BLOOD PRESSURE: 70 MMHG | RESPIRATION RATE: 16 BRPM | BODY MASS INDEX: 29.26 KG/M2 | WEIGHT: 159 LBS | SYSTOLIC BLOOD PRESSURE: 100 MMHG

## 2024-06-27 DIAGNOSIS — F32.A DEPRESSION, UNSPECIFIED DEPRESSION TYPE: ICD-10-CM

## 2024-06-27 DIAGNOSIS — R06.02 SOB (SHORTNESS OF BREATH): ICD-10-CM

## 2024-06-27 DIAGNOSIS — G47.00 INSOMNIA, UNSPECIFIED TYPE: ICD-10-CM

## 2024-06-27 DIAGNOSIS — F41.9 ANXIETY: ICD-10-CM

## 2024-06-27 PROCEDURE — 71046 X-RAY EXAM CHEST 2 VIEWS: CPT

## 2024-06-27 PROCEDURE — 99214 OFFICE O/P EST MOD 30 MIN: CPT | Performed by: FAMILY MEDICINE

## 2024-06-27 PROCEDURE — 3078F DIAST BP <80 MM HG: CPT | Performed by: FAMILY MEDICINE

## 2024-06-27 PROCEDURE — 3074F SYST BP LT 130 MM HG: CPT | Performed by: FAMILY MEDICINE

## 2024-06-27 RX ORDER — TRAZODONE HYDROCHLORIDE 100 MG/1
100 TABLET ORAL NIGHTLY
Qty: 90 TABLET | Refills: 1 | Status: SHIPPED | OUTPATIENT
Start: 2024-06-27

## 2024-06-27 NOTE — PROGRESS NOTES
"Chief Complaint   Patient presents with    Medication Refill    Follow-Up     Urgent care followup -  Still c/o upper back pain       Verbal consent was acquired by the patient to use [employer] ambient listening note generation during this visit Yes      HPI:  History of Present Illness  The patient presents for evaluation of multiple medical concerns.    The patient sought medical attention at an urgent care facility on Monday due to difficulty in ascending stairs due to dyspnea and chest heaviness. The urgent care physician suspected a viral infection and conducted diagnostic tests, which yielded negative results. Despite this, the patient continues to experience dyspnea. She also reports upper back pain for the past 6 weeks, a symptom she has not previously experienced. The urgent care physician recommended a chest x-ray during her follow-up visit. . Her dyspnea has intensified over the past 2 weeks, which she initially attributed to allergies due to persistent voice loss and general malaise. She reports a non-productive cough. She has never undergone a stress test. An EKG was performed during her urgent care visit. The albuterol inhaler has provided minimal relief. She experienced a fever last Saturday and Sunday.    The patient's depression is well-managed with fluoxetine and bupropion.              Exam:     /70 (BP Location: Right arm, Patient Position: Sitting, BP Cuff Size: Adult)   Resp 16   Ht 1.575 m (5' 2\")   Wt 72.1 kg (159 lb)   LMP 12/01/2011 (Approximate)   BMI 29.08 kg/m²   Gen.: Well-developed, well-nourished, no apparent distress, pleasant and cooperative with the examination  HEENT: Normocephalic/atraumatic, sinuses nontender with palpation, TMs clear, nares patent with pink mucosa and clear rhinorrhea, oropharynx clear  Neck: No JVD or bruits, no adenopathy  Cor: Regular rate and rhythm without murmur gallop or rub  Lungs: Bronchial breathing on the right lung field, no crepitation " or wheezing.  Noted discrepancy between the 2 sides of the lung on air entry    Abdomen: Soft nontender without hepatosplenomegaly or masses appreciated, normoactive bowel sounds  Extremities: No cyanosis, clubbing or edema     Assessment & Plan    1. Depression, unspecified depression type  Well-controlled continue current regimen    2. SOB (shortness of breath)  New concern, clinical exam suggestive of possible pneumonia or other pathology will order stat chest x-ray, advised to do it today and I will let her know the plan of management after I review the chest x-ray.  Will also consider stress test if his shortness of breath with exertion continued.  - DX-CHEST-2 VIEWS; Future    3. Anxiety  Chronic, patient said she takes trazodone at night high-dose, requesting refill.  Denies side effects  - traZODone (DESYREL) 100 MG Tab; Take 1 Tablet by mouth every evening.  Dispense: 90 Tablet; Refill: 1    4. Insomnia, unspecified type    - traZODone (DESYREL) 100 MG Tab; Take 1 Tablet by mouth every evening.  Dispense: 90 Tablet; Refill: 1          Please note that this dictation was created using voice recognition software. I have made every reasonable attempt to correct obvious errors but there may be errors of grammar and content that I may have overlooked prior to finalization of this note.   '

## 2024-07-02 NOTE — CARE PLAN
"Problem: Safety  Goal: Will remain free from falls  Outcome: PROGRESSING AS EXPECTED  No falls this shift. Ambulated with SBA, gait steady. Calling appropriately for assistance. Falls precautions in place: bed in lowest   and locked position, side rails raised x 2, room near nurses station, call light within reach, nonslip socks on, purposeful hourly rounding.       Problem: Hemodynamic Status  Goal: Stable Vital Signs and Fluid Balance  Outcome: PROGRESSING AS EXPECTED  Hemodynamically stable throughout shift. Neuro checks done q4h, no neurologic deficit noted. Patient reports vision in right eye   is \"a little blurry\" and RUE/RLE with tingling. States this has been present since symptoms started PTA. NIHSS = 0. HR remains NSR.   BP normotensive. SpO2 WNL on RA. Denies any headache or N/V. Planned for MRI today 9/10.      " Message route to [facility] surgical coordinator .

## 2024-07-03 ENCOUNTER — DOCUMENTATION (OUTPATIENT)
Dept: HEALTH INFORMATION MANAGEMENT | Facility: OTHER | Age: 44
End: 2024-07-03
Payer: COMMERCIAL

## 2024-07-15 DIAGNOSIS — F41.9 ANXIETY: ICD-10-CM

## 2024-07-15 RX ORDER — BUSPIRONE HYDROCHLORIDE 5 MG/1
5 TABLET ORAL 2 TIMES DAILY
Qty: 180 TABLET | Refills: 1 | Status: SHIPPED | OUTPATIENT
Start: 2024-07-15

## 2024-08-02 DIAGNOSIS — F41.9 ANXIETY: ICD-10-CM

## 2024-08-02 DIAGNOSIS — G47.00 INSOMNIA, UNSPECIFIED TYPE: ICD-10-CM

## 2024-08-02 RX ORDER — TRAZODONE HYDROCHLORIDE 100 MG/1
100 TABLET ORAL NIGHTLY
Qty: 90 TABLET | Refills: 1 | OUTPATIENT
Start: 2024-08-02

## 2024-08-02 RX ORDER — TRAZODONE HYDROCHLORIDE 100 MG/1
200 TABLET ORAL NIGHTLY
Qty: 36 TABLET | Refills: 0 | Status: SHIPPED | OUTPATIENT
Start: 2024-08-02

## 2024-08-13 ENCOUNTER — PATIENT MESSAGE (OUTPATIENT)
Dept: HEALTH INFORMATION MANAGEMENT | Facility: OTHER | Age: 44
End: 2024-08-13

## 2024-09-19 ENCOUNTER — TELEPHONE (OUTPATIENT)
Dept: HEALTH INFORMATION MANAGEMENT | Facility: OTHER | Age: 44
End: 2024-09-19
Payer: COMMERCIAL

## 2024-10-11 SDOH — HEALTH STABILITY: PHYSICAL HEALTH: ON AVERAGE, HOW MANY MINUTES DO YOU ENGAGE IN EXERCISE AT THIS LEVEL?: 60 MIN

## 2024-10-11 SDOH — ECONOMIC STABILITY: TRANSPORTATION INSECURITY

## 2024-10-11 SDOH — HEALTH STABILITY: PHYSICAL HEALTH: ON AVERAGE, HOW MANY DAYS PER WEEK DO YOU ENGAGE IN MODERATE TO STRENUOUS EXERCISE (LIKE A BRISK WALK)?: 4 DAYS

## 2024-10-11 SDOH — HEALTH STABILITY: MENTAL HEALTH
STRESS IS WHEN SOMEONE FEELS TENSE, NERVOUS, ANXIOUS, OR CAN'T SLEEP AT NIGHT BECAUSE THEIR MIND IS TROUBLED. HOW STRESSED ARE YOU?: VERY MUCH

## 2024-10-11 SDOH — ECONOMIC STABILITY: HOUSING INSECURITY

## 2024-10-11 ASSESSMENT — SOCIAL DETERMINANTS OF HEALTH (SDOH)
IN A TYPICAL WEEK, HOW MANY TIMES DO YOU TALK ON THE PHONE WITH FAMILY, FRIENDS, OR NEIGHBORS?: MORE THAN THREE TIMES A WEEK
IN THE PAST 12 MONTHS, HAS THE ELECTRIC, GAS, OIL, OR WATER COMPANY THREATENED TO SHUT OFF SERVICE IN YOUR HOME?: PATIENT DECLINED
DO YOU BELONG TO ANY CLUBS OR ORGANIZATIONS SUCH AS CHURCH GROUPS UNIONS, FRATERNAL OR ATHLETIC GROUPS, OR SCHOOL GROUPS?: NO
HOW OFTEN DO YOU GET TOGETHER WITH FRIENDS OR RELATIVES?: ONCE A WEEK
HOW OFTEN DO YOU ATTEND CHURCH OR RELIGIOUS SERVICES?: PATIENT DECLINED
IN A TYPICAL WEEK, HOW MANY TIMES DO YOU TALK ON THE PHONE WITH FAMILY, FRIENDS, OR NEIGHBORS?: MORE THAN THREE TIMES A WEEK
HOW OFTEN DO YOU ATTEND CHURCH OR RELIGIOUS SERVICES?: PATIENT DECLINED
HOW OFTEN DO YOU ATTENT MEETINGS OF THE CLUB OR ORGANIZATION YOU BELONG TO?: PATIENT DECLINED
DO YOU BELONG TO ANY CLUBS OR ORGANIZATIONS SUCH AS CHURCH GROUPS UNIONS, FRATERNAL OR ATHLETIC GROUPS, OR SCHOOL GROUPS?: NO
HOW OFTEN DO YOU ATTENT MEETINGS OF THE CLUB OR ORGANIZATION YOU BELONG TO?: PATIENT DECLINED
HOW MANY DRINKS CONTAINING ALCOHOL DO YOU HAVE ON A TYPICAL DAY WHEN YOU ARE DRINKING: PATIENT DECLINED
HOW OFTEN DO YOU GET TOGETHER WITH FRIENDS OR RELATIVES?: ONCE A WEEK

## 2024-10-11 ASSESSMENT — LIFESTYLE VARIABLES: HOW MANY STANDARD DRINKS CONTAINING ALCOHOL DO YOU HAVE ON A TYPICAL DAY: PATIENT DECLINED

## 2024-10-14 ENCOUNTER — APPOINTMENT (OUTPATIENT)
Dept: LAB | Facility: MEDICAL CENTER | Age: 44
End: 2024-10-14
Payer: COMMERCIAL

## 2024-10-14 ENCOUNTER — APPOINTMENT (OUTPATIENT)
Dept: MEDICAL GROUP | Facility: PHYSICIAN GROUP | Age: 44
End: 2024-10-14
Payer: COMMERCIAL

## 2024-10-14 VITALS
SYSTOLIC BLOOD PRESSURE: 104 MMHG | HEIGHT: 63 IN | WEIGHT: 160 LBS | HEART RATE: 76 BPM | TEMPERATURE: 96.9 F | OXYGEN SATURATION: 98 % | BODY MASS INDEX: 28.35 KG/M2 | DIASTOLIC BLOOD PRESSURE: 72 MMHG

## 2024-10-14 DIAGNOSIS — F41.9 ANXIETY: ICD-10-CM

## 2024-10-14 DIAGNOSIS — F33.0 MILD EPISODE OF RECURRENT MAJOR DEPRESSIVE DISORDER (HCC): ICD-10-CM

## 2024-10-14 DIAGNOSIS — E03.9 HYPOTHYROIDISM, UNSPECIFIED TYPE: ICD-10-CM

## 2024-10-14 DIAGNOSIS — Z23 NEED FOR VACCINATION: ICD-10-CM

## 2024-10-14 DIAGNOSIS — G47.00 INSOMNIA, UNSPECIFIED TYPE: ICD-10-CM

## 2024-10-14 DIAGNOSIS — Z00.00 HEALTHCARE MAINTENANCE: ICD-10-CM

## 2024-10-14 DIAGNOSIS — Z12.31 ENCOUNTER FOR SCREENING MAMMOGRAM FOR MALIGNANT NEOPLASM OF BREAST: ICD-10-CM

## 2024-10-14 DIAGNOSIS — E78.5 HYPERLIPIDEMIA, UNSPECIFIED HYPERLIPIDEMIA TYPE: ICD-10-CM

## 2024-10-14 PROBLEM — E83.52 HYPERCALCEMIA: Status: ACTIVE | Noted: 2024-10-14

## 2024-10-14 PROBLEM — S43.006A DISLOCATION OF SHOULDER JOINT: Status: ACTIVE | Noted: 2017-03-19

## 2024-10-14 PROCEDURE — 3074F SYST BP LT 130 MM HG: CPT | Performed by: FAMILY MEDICINE

## 2024-10-14 PROCEDURE — 3078F DIAST BP <80 MM HG: CPT | Performed by: FAMILY MEDICINE

## 2024-10-14 PROCEDURE — 90677 PCV20 VACCINE IM: CPT | Performed by: FAMILY MEDICINE

## 2024-10-14 PROCEDURE — 90656 IIV3 VACC NO PRSV 0.5 ML IM: CPT | Performed by: FAMILY MEDICINE

## 2024-10-14 PROCEDURE — 99214 OFFICE O/P EST MOD 30 MIN: CPT | Mod: 25 | Performed by: FAMILY MEDICINE

## 2024-10-14 PROCEDURE — 90472 IMMUNIZATION ADMIN EACH ADD: CPT | Performed by: FAMILY MEDICINE

## 2024-10-14 PROCEDURE — 90471 IMMUNIZATION ADMIN: CPT | Performed by: FAMILY MEDICINE

## 2024-10-14 RX ORDER — TRAZODONE HYDROCHLORIDE 100 MG/1
200 TABLET ORAL NIGHTLY
Qty: 180 TABLET | Refills: 0 | Status: SHIPPED | OUTPATIENT
Start: 2024-10-14

## 2024-10-14 ASSESSMENT — ENCOUNTER SYMPTOMS
PALPITATIONS: 0
ABDOMINAL PAIN: 0
CHILLS: 0
VOMITING: 0
SORE THROAT: 0
COUGH: 0
FEVER: 0
SHORTNESS OF BREATH: 0
NAUSEA: 0

## 2024-10-14 ASSESSMENT — PATIENT HEALTH QUESTIONNAIRE - PHQ9: CLINICAL INTERPRETATION OF PHQ2 SCORE: 0

## 2024-10-22 ENCOUNTER — APPOINTMENT (OUTPATIENT)
Dept: RADIOLOGY | Facility: MEDICAL CENTER | Age: 44
End: 2024-10-22
Attending: FAMILY MEDICINE
Payer: COMMERCIAL

## 2024-11-01 ENCOUNTER — HOSPITAL ENCOUNTER (OUTPATIENT)
Dept: LAB | Facility: MEDICAL CENTER | Age: 44
End: 2024-11-01
Attending: FAMILY MEDICINE
Payer: COMMERCIAL

## 2024-11-01 DIAGNOSIS — E78.5 HYPERLIPIDEMIA, UNSPECIFIED HYPERLIPIDEMIA TYPE: ICD-10-CM

## 2024-11-01 DIAGNOSIS — E03.9 HYPOTHYROIDISM, UNSPECIFIED TYPE: ICD-10-CM

## 2024-11-01 LAB
CHOLEST SERPL-MCNC: 279 MG/DL (ref 100–199)
FASTING STATUS PATIENT QL REPORTED: NORMAL
HDLC SERPL-MCNC: 56 MG/DL
LDLC SERPL CALC-MCNC: 156 MG/DL
T4 FREE SERPL-MCNC: 0.91 NG/DL (ref 0.93–1.7)
TRIGL SERPL-MCNC: 333 MG/DL (ref 0–149)
TSH SERPL-ACNC: 0.28 UIU/ML (ref 0.35–5.5)

## 2024-11-01 PROCEDURE — 36415 COLL VENOUS BLD VENIPUNCTURE: CPT

## 2024-11-01 PROCEDURE — 84439 ASSAY OF FREE THYROXINE: CPT

## 2024-11-01 PROCEDURE — 80061 LIPID PANEL: CPT

## 2024-11-01 PROCEDURE — 84443 ASSAY THYROID STIM HORMONE: CPT

## 2024-11-06 ENCOUNTER — APPOINTMENT (OUTPATIENT)
Dept: MEDICAL GROUP | Facility: PHYSICIAN GROUP | Age: 44
End: 2024-11-06
Payer: COMMERCIAL

## 2024-11-06 ENCOUNTER — HOSPITAL ENCOUNTER (OUTPATIENT)
Facility: MEDICAL CENTER | Age: 44
End: 2024-11-06
Attending: FAMILY MEDICINE
Payer: COMMERCIAL

## 2024-11-06 VITALS
BODY MASS INDEX: 28.88 KG/M2 | WEIGHT: 163 LBS | DIASTOLIC BLOOD PRESSURE: 64 MMHG | HEART RATE: 87 BPM | SYSTOLIC BLOOD PRESSURE: 104 MMHG | HEIGHT: 63 IN | OXYGEN SATURATION: 98 % | TEMPERATURE: 97.4 F

## 2024-11-06 DIAGNOSIS — E78.2 MIXED HYPERLIPIDEMIA: ICD-10-CM

## 2024-11-06 DIAGNOSIS — G56.03 BILATERAL CARPAL TUNNEL SYNDROME: ICD-10-CM

## 2024-11-06 DIAGNOSIS — R63.4 WEIGHT LOSS: ICD-10-CM

## 2024-11-06 DIAGNOSIS — E03.9 HYPOTHYROIDISM, UNSPECIFIED TYPE: Chronic | ICD-10-CM

## 2024-11-06 PROBLEM — G56.00 CTS (CARPAL TUNNEL SYNDROME): Status: ACTIVE | Noted: 2024-11-06

## 2024-11-06 PROCEDURE — 3078F DIAST BP <80 MM HG: CPT | Performed by: FAMILY MEDICINE

## 2024-11-06 PROCEDURE — 99214 OFFICE O/P EST MOD 30 MIN: CPT | Performed by: FAMILY MEDICINE

## 2024-11-06 PROCEDURE — 3074F SYST BP LT 130 MM HG: CPT | Performed by: FAMILY MEDICINE

## 2024-11-06 PROCEDURE — G0481 DRUG TEST DEF 8-14 CLASSES: HCPCS

## 2024-11-06 RX ORDER — LEVOTHYROXINE SODIUM 100 UG/1
100 TABLET ORAL
Qty: 90 TABLET | Refills: 1 | Status: SHIPPED | OUTPATIENT
Start: 2024-11-06

## 2024-11-06 RX ORDER — ROSUVASTATIN CALCIUM 10 MG/1
10 TABLET, COATED ORAL EVERY EVENING
Qty: 90 TABLET | Refills: 2 | Status: SHIPPED | OUTPATIENT
Start: 2024-11-06

## 2024-11-06 RX ORDER — PHENTERMINE HYDROCHLORIDE 15 MG/1
15 CAPSULE ORAL EVERY MORNING
Qty: 90 CAPSULE | Refills: 0 | Status: SHIPPED | OUTPATIENT
Start: 2024-11-06 | End: 2025-02-04

## 2024-11-06 ASSESSMENT — PATIENT HEALTH QUESTIONNAIRE - PHQ9
1. LITTLE INTEREST OR PLEASURE IN DOING THINGS: NOT AT ALL
2. FEELING DOWN, DEPRESSED, IRRITABLE, OR HOPELESS: NOT AT ALL
SUM OF ALL RESPONSES TO PHQ QUESTIONS 1-9: 0
4. FEELING TIRED OR HAVING LITTLE ENERGY: NOT AT ALL
5. POOR APPETITE OR OVEREATING: NOT AT ALL
3. TROUBLE FALLING OR STAYING ASLEEP OR SLEEPING TOO MUCH: NOT AT ALL
8. MOVING OR SPEAKING SO SLOWLY THAT OTHER PEOPLE COULD HAVE NOTICED. OR THE OPPOSITE, BEING SO FIGETY OR RESTLESS THAT YOU HAVE BEEN MOVING AROUND A LOT MORE THAN USUAL: NOT AT ALL
6. FEELING BAD ABOUT YOURSELF - OR THAT YOU ARE A FAILURE OR HAVE LET YOURSELF OR YOUR FAMILY DOWN: NOT AL ALL
7. TROUBLE CONCENTRATING ON THINGS, SUCH AS READING THE NEWSPAPER OR WATCHING TELEVISION: NOT AT ALL
SUM OF ALL RESPONSES TO PHQ9 QUESTIONS 1 AND 2: 0
9. THOUGHTS THAT YOU WOULD BE BETTER OFF DEAD, OR OF HURTING YOURSELF: NOT AT ALL

## 2024-11-06 NOTE — PROGRESS NOTES
"Verbal consent was acquired by the patient to use [employer] ambient listening note generation during this visit.    Subjective:     HPI:   History of Present Illness  The patient is a 44-year-old female presenting today as an established patient for lab results review.    She was found to have significantly elevated total cholesterol, triglycerides, and LDL, as well as slightly low TSH and free T4. She is currently on levothyroxine 100 mcg and is not on any treatment for statins. She has been taking Synthroid 100 mcg daily in the morning since she was 20 years old and reports no side effects. She is seeking a new prescription for this medication.    She is interested in discussing the use of phentermine for weight management. She has previously used phentermine, prescribed by her surgeon three weeks prior to surgery, to help control her appetite and reduce hunger post-surgery. She found it beneficial in curbing her cravings, particularly for sugar.    She reports experiencing tingling and numbness in her left hand, which she attributes to a nerve issue following her pneumonia and flu vaccinations. The symptoms extend to the tips of her fingers and are more pronounced at night. She notes the symptoms are not present throughout the whole arm but only in the fingertips of the 1st through 3rd fingers.She also mentions having carpal tunnel syndrome in her right hand, which was surgically treated years ago. She is scheduled to see her doctor for this issue. She receives injections from her doctor to help with this.    FAMILY HISTORY  She has a family history of high cholesterol. Her mother had calcification in her arteries and stents in her arteries.    Health Maintenance: Completed    Objective:     Exam:  /64 (BP Location: Right arm, Patient Position: Sitting, BP Cuff Size: Adult)   Pulse 87   Temp 36.3 °C (97.4 °F) (Temporal)   Ht 1.6 m (5' 3\")   Wt 73.9 kg (163 lb)   LMP 12/01/2011 (Approximate)   SpO2 " 98%   BMI 28.87 kg/m²  Body mass index is 28.87 kg/m².    Physical Exam  Vitals reviewed.   Constitutional:       Appearance: Normal appearance.   HENT:      Head: Normocephalic and atraumatic.      Right Ear: External ear normal.      Left Ear: External ear normal.      Nose: Nose normal.   Cardiovascular:      Rate and Rhythm: Normal rate and regular rhythm.      Pulses: Normal pulses.      Heart sounds: Normal heart sounds. No murmur heard.  Pulmonary:      Effort: Pulmonary effort is normal. No respiratory distress.      Breath sounds: Normal breath sounds. No wheezing.   Abdominal:      General: Abdomen is flat.      Palpations: Abdomen is soft.   Skin:     General: Skin is warm and dry.   Neurological:      Mental Status: She is alert and oriented to person, place, and time.   Psychiatric:         Mood and Affect: Mood normal.         Behavior: Behavior normal.       General: The patient is awake, alert, oriented. Dressed appropriately with good eye contact. No acute distress.   Examination of the BILATERAL hand:    NO Tenderness to palpation   No tenderness to palpation of the first through fifth metacarpals or CMC joints.  No tenderness palpation over the anatomic snuffbox, DRUJ, distal radius, ulna, radiocarpal joint or midcarpal joint.   The patient is able to curl down and make a full composite fist.  There is intact extension of the fingers and the thumb all the way out to their tips.   Distally is neurovascularly intact.   2+ radial pulse.   Positive Dirkin's Phalen's and Tinel's     There is mild swelling   Flexion extension radial and ulnar deviation at the wrist will clearly fire.  Elbow: No gross tenderness to the medial or lateral epicondyl or radial head. No obvious deformity of the elbow.   Skin: No warmth, redness, heat or rashes.       Results  Laboratory Studies  Total cholesterol, triglycerides, and LDL significantly elevated. Free T4 slightly low. TSH low.    Assessment & Plan:     1.  Weight loss  phentermine 15 MG capsule    Controlled Substance Treatment Agreement    Pain Management Screen      2. Mixed hyperlipidemia        3. Hypothyroidism, unspecified type        4. Bilateral carpal tunnel syndrome            Assessment & Plan  1. Hypothyroidism.  Her thyroid labs are slightly abnormal, with a low free T4 and TSH, indicating potential over supplementation. However, given her stable dose and lack of side effects, no immediate changes are necessary. A prescription for Synthroid 100 mcg will be sent to her pharmacy. She was advised to monitor for symptoms of over or under functioning thyroid, such as anxiety, excessive sweating, unexplained weight loss, diarrhea, frequent menstrual cycles, weight gain, and feeling cold.    2. Hyperlipidemia.  Her cholesterol levels are high, with elevated triglycerides, total cholesterol, and LDL. Despite being under 45, the recommendation is to initiate statin therapy due to the increased risk of strokes and heart attacks. A prescription for Crestor 10 mg will be sent to her pharmacy. Her lipids will be rechecked in 6 months to determine if an increase in dosage is necessary.    3. Weight management.  Hypothyroidism is not a contraindication for weight management. A low-dose phentermine 15 mg will be initiated, with the option to increase the dosage if necessary. A urine sample will be collected due to the controlled nature of the substance. She will be required to sign a document for controlled substances and will need to be seen twice every 3 months for refills.    4. Carpal tunnel syndrome, left.  The symptoms suggest carpal tunnel syndrome, which may have coincidentally occurred after her vaccinations. She was advised to wear a wrist brace at night as an initial treatment. If she experiences weakness in the hand or muscle wasting, a release procedure may be necessary.            Return in about 3 months (around 2/6/2025).    Please note that this  dictation was created using voice recognition software. I have made every reasonable attempt to correct obvious errors, but I expect that there are errors of grammar and possibly content that I did not discover before finalizing the note.    Helga Owusu MD  Family Medicine and Non - Operative Sports Medicine   [facility] [place]

## 2024-11-10 LAB
1OH-MIDAZOLAM UR QL SCN: NOT DETECTED
6MAM UR QL: NOT DETECTED
7AMINOCLONAZEPAM UR QL: NOT DETECTED
A-OH ALPRAZ UR QL: NOT DETECTED
ALPRAZ UR QL: NOT DETECTED
AMPHET UR QL SCN: NOT DETECTED
ANNOTATION COMMENT IMP: NORMAL
BARBITURATES UR QL: NEGATIVE
BUPRENORPHINE UR QL: NOT DETECTED
BZE UR QL: NEGATIVE
CARBOXYTHC UR QL: NEGATIVE
CARISOPRODOL UR QL: NEGATIVE
CLONAZEPAM UR QL: NOT DETECTED
CODEINE UR QL: NOT DETECTED
CREAT UR-MCNC: 28.1 MG/DL (ref 20–400)
DIAZEPAM UR QL: NOT DETECTED
ETHYL GLUCURONIDE UR QL: NEGATIVE
FENTANYL UR QL: NOT DETECTED
GABAPENTIN UR QL CFM: NOT DETECTED
HYDROCODONE UR QL: NOT DETECTED
HYDROMORPHONE UR QL: NOT DETECTED
LORAZEPAM UR QL: NOT DETECTED
MDA UR QL: NOT DETECTED
MDEA UR QL: NOT DETECTED
MDMA UR QL: NOT DETECTED
ME-PHENIDATE UR QL: NOT DETECTED
METHADONE UR QL: NEGATIVE
METHAMPHET UR QL: NOT DETECTED
MIDAZOLAM UR QL SCN: NOT DETECTED
MORPHINE UR QL: NOT DETECTED
NALOXONE UR QL CFM: NOT DETECTED
NORBUPRENORPHINE UR QL CFM: NOT DETECTED
NORDIAZEPAM UR QL: NOT DETECTED
NORFENTANYL UR QL: NOT DETECTED
NORHYDROCODONE UR QL CFM: NOT DETECTED
NORMEPERIDINE UR QL CFM: NOT DETECTED
NOROXYCODONE UR QL CFM: NOT DETECTED
NOROXYMORPHONE UR QL SCN: NOT DETECTED
OXAZEPAM UR QL: NOT DETECTED
OXYCODONE UR QL: NOT DETECTED
OXYMORPHONE UR QL: NOT DETECTED
PATHOLOGY STUDY: NORMAL
PCP UR QL: NEGATIVE
PHENTERMINE UR QL: NOT DETECTED
PREGABALIN UR QL CFM: NOT DETECTED
SERVICE CMNT-IMP: NORMAL
TAPENTADOL UR QL SCN: NOT DETECTED
TAPENTADOL UR QL SCN: NOT DETECTED
TEMAZEPAM UR QL: NOT DETECTED
TRAMADOL UR QL: NEGATIVE
ZOLPIDEM PHENYL-4-CARB UR QL SCN: NOT DETECTED
ZOLPIDEM UR QL: NOT DETECTED

## 2024-11-11 ENCOUNTER — HOSPITAL ENCOUNTER (OUTPATIENT)
Dept: RADIOLOGY | Facility: MEDICAL CENTER | Age: 44
End: 2024-11-11
Attending: FAMILY MEDICINE
Payer: COMMERCIAL

## 2024-11-11 DIAGNOSIS — Z12.31 ENCOUNTER FOR SCREENING MAMMOGRAM FOR MALIGNANT NEOPLASM OF BREAST: ICD-10-CM

## 2024-11-11 PROCEDURE — 77067 SCR MAMMO BI INCL CAD: CPT

## 2024-12-15 DIAGNOSIS — G47.00 INSOMNIA, UNSPECIFIED TYPE: ICD-10-CM

## 2024-12-15 DIAGNOSIS — F41.9 ANXIETY: ICD-10-CM

## 2024-12-16 RX ORDER — TRAZODONE HYDROCHLORIDE 100 MG/1
200 TABLET ORAL NIGHTLY
Qty: 180 TABLET | Refills: 1 | Status: SHIPPED | OUTPATIENT
Start: 2024-12-16

## 2024-12-16 NOTE — TELEPHONE ENCOUNTER
Received request via: Pharmacy      Pharmacy comment: REQUEST FOR 90 DAYS PRESCRIPTION. DX Code Needed.     Was the patient seen in the last year in this department? Yes    Does the patient have an active prescription (recently filled or refills available) for medication(s) requested? No    Pharmacy Name:     Does the patient have nursing home Plus and need 100-day supply? (This applies to ALL medications) Patient does not have SCP

## 2025-02-05 ENCOUNTER — OFFICE VISIT (OUTPATIENT)
Dept: MEDICAL GROUP | Facility: PHYSICIAN GROUP | Age: 45
End: 2025-02-05
Payer: COMMERCIAL

## 2025-02-05 VITALS
HEIGHT: 63 IN | TEMPERATURE: 98.3 F | BODY MASS INDEX: 27.57 KG/M2 | HEART RATE: 108 BPM | OXYGEN SATURATION: 99 % | SYSTOLIC BLOOD PRESSURE: 108 MMHG | WEIGHT: 155.6 LBS | DIASTOLIC BLOOD PRESSURE: 72 MMHG

## 2025-02-05 DIAGNOSIS — E66.3 OVERWEIGHT: ICD-10-CM

## 2025-02-05 DIAGNOSIS — Z79.890 HORMONE REPLACEMENT THERAPY: ICD-10-CM

## 2025-02-05 DIAGNOSIS — B34.9 VIRAL ILLNESS: ICD-10-CM

## 2025-02-05 DIAGNOSIS — R63.4 WEIGHT LOSS: ICD-10-CM

## 2025-02-05 DIAGNOSIS — R23.2 HOT FLASHES: ICD-10-CM

## 2025-02-05 PROCEDURE — 3078F DIAST BP <80 MM HG: CPT | Performed by: FAMILY MEDICINE

## 2025-02-05 PROCEDURE — 3074F SYST BP LT 130 MM HG: CPT | Performed by: FAMILY MEDICINE

## 2025-02-05 PROCEDURE — 99214 OFFICE O/P EST MOD 30 MIN: CPT | Performed by: FAMILY MEDICINE

## 2025-02-05 RX ORDER — ESTRADIOL 0.1 MG/D
1 PATCH TRANSDERMAL
Qty: 4 PATCH | Refills: 3 | Status: SHIPPED | OUTPATIENT
Start: 2025-02-05

## 2025-02-05 RX ORDER — PHENTERMINE HYDROCHLORIDE 30 MG/1
30 CAPSULE ORAL EVERY MORNING
Qty: 90 CAPSULE | Refills: 0 | Status: SHIPPED | OUTPATIENT
Start: 2025-02-05 | End: 2025-05-06

## 2025-02-05 ASSESSMENT — PATIENT HEALTH QUESTIONNAIRE - PHQ9: CLINICAL INTERPRETATION OF PHQ2 SCORE: 0

## 2025-02-05 NOTE — PROGRESS NOTES
Verbal consent was acquired by the patient to use [email or web address] ambient listening note generation during this visit.    Subjective:     HPI:   History of Present Illness  The patient presents for a follow-up regarding laboratory results, vasomotor symptoms, otalgia, and tachycardia.    The patient reports experiencing vasomotor symptoms, specifically hot flashes, which she attributes to her menopausal status. She previously underwent estrogen therapy from the age of 32 until 42, at which point it was discontinued. Over the past year, she has observed an increase in the frequency of her hot flashes, occurring 4 to 6 times daily. She expresses interest in exploring non-hormonal pharmacologic options for the management of these symptoms. She has no personal history of breast cancer or estrogen receptor-positive malignancies. Her mammograms have consistently yielded normal results, and she adheres to annual screening. She has no history of thromboembolic events and is a non-smoker. Her surgical history includes a hysterectomy at age 31 due to a ruptured right ovary, followed by a uterine procedure at age 32. She commenced low-dose estrogen therapy at age 34 after her left ovary ruptured a year later.    The patient also reports experiencing otalgia, with the right ear being more symptomatic than the left. She noted the onset of tinnitus the previous night. She denies any associated sore throat, cough, fever, or chills. She reports no recent exposure to individuals with infectious illnesses, although her daughters had a cold a few weeks ago. She denies any sleep disturbances.    The patient has been taking phentermine for the past 3 months, initially at a dose of 15 mg, which she doubled during the Maria period. She has not taken the medication since January 13, 2025. She reports no adverse effects from the medication. She denies symptoms of tachycardia or palpitations. She has not experienced any dyspnea but did  "report severe right arm pain during a workout session on Tuesday, which she attributes to bursitis in her shoulder. She denies any chest pain or discomfort. She has been experiencing rhinorrhea. She reports feeling unwell upon waking today and also felt unwell on Monday. She typically participates in spin classes for an hour, 4 to 5 days a week, but was unable to attend on Monday due to her symptoms. She attended a class yesterday but did not feel well afterward. She denies any sensation of flushing or palpitations.    SOCIAL HISTORY  She does not smoke.    FAMILY HISTORY  She does not have a family history of breast cancer.    MEDICATIONS  Current: Phentermine    Health Maintenance: Completed    Objective:     Exam:  /72 (BP Location: Left arm, Patient Position: Sitting, BP Cuff Size: Adult)   Pulse (!) 108   Temp 36.8 °C (98.3 °F) (Temporal)   Ht 1.6 m (5' 3\")   Wt 70.6 kg (155 lb 9.6 oz)   LMP 12/01/2011 (Approximate)   SpO2 99%   BMI 27.56 kg/m²  Body mass index is 27.56 kg/m².    Physical Exam  Vitals reviewed.   Constitutional:       Appearance: Normal appearance.   HENT:      Head: Normocephalic and atraumatic.      Right Ear: External ear normal.      Left Ear: External ear normal.      Nose: Nose normal.   Cardiovascular:      Rate and Rhythm: Normal rate and regular rhythm.      Pulses: Normal pulses.      Heart sounds: Normal heart sounds. No murmur heard.  Pulmonary:      Effort: Pulmonary effort is normal. No respiratory distress.      Breath sounds: Normal breath sounds. No wheezing.   Abdominal:      General: Abdomen is flat.      Palpations: Abdomen is soft.   Skin:     General: Skin is warm and dry.   Neurological:      Mental Status: She is alert and oriented to person, place, and time.   Psychiatric:         Mood and Affect: Mood normal.         Behavior: Behavior normal.             Results      Assessment & Plan:     1. Weight loss        2. Overweight  phentermine 30 MG capsule    "   3. Viral illness  POCT CEPHEID COV-2, FLU A/B, RSV - PCR      4. Hot flashes        5. Hormone replacement therapy            Assessment & Plan  1. Tachycardia.  Her pulse rate has increased from 87 to 108 since the last visit. This could be attributed to a recent viral infection or the use of phentermine. She has lost some weight, which is a positive outcome. A swab test will be conducted today to rule out influenza, COVID-19, and RSV. If the test results are positive for a viral infection, symptomatic treatment will be initiated. If her pulse rate decreases upon re-evaluation, a prescription for phentermine will be provided. She has been advised to monitor for signs of overstimulation such as tachycardia, palpitations, or excessive sweating at rest. If these symptoms occur, she should discontinue the use of phentermine for a few days. If symptoms persist beyond 10 to 14 days, she should return for a follow-up visit, at which point antibiotic therapy may be considered.    2. Hot flashes.  Her hot flashes could be a side effect of phentermine or a symptom of menopause. The risks associated with estrogen therapy, including DVTs and PEs, have been discussed. Given her history of hysterectomy and oophorectomy, there are no contraindications to resuming estrogen therapy. She has been advised to report any side effects immediately. A prescription for estrogen has been provided.    3. Ear discomfort.  She reports ear discomfort and ringing, with the right ear being more bothersome. Examination revealed ear wax, which can sometimes cause congestion or muffled hearing. No signs of a severe ear infection were noted. She has been advised to monitor her symptoms and report any worsening or persistent issues.    Follow-up  The patient is scheduled for a follow-up visit in 3 months.    PROCEDURE  The patient underwent a hysterectomy at age 31 due to a ruptured right ovary, followed by a uterine procedure at age 32.      Return  in about 3 months (around 5/5/2025) for Controlled substance.    Please note that this dictation was created using voice recognition software. I have made every reasonable attempt to correct obvious errors, but I expect that there are errors of grammar and possibly content that I did not discover before finalizing the note.      Helga Owusu MD  Family Medicine and Non - Operative Sports Medicine   [facility]

## 2025-03-10 ENCOUNTER — OFFICE VISIT (OUTPATIENT)
Dept: MEDICAL GROUP | Facility: PHYSICIAN GROUP | Age: 45
End: 2025-03-10
Payer: COMMERCIAL

## 2025-03-10 VITALS
TEMPERATURE: 98 F | HEIGHT: 63 IN | BODY MASS INDEX: 27 KG/M2 | HEART RATE: 105 BPM | OXYGEN SATURATION: 99 % | WEIGHT: 152.4 LBS | DIASTOLIC BLOOD PRESSURE: 72 MMHG | SYSTOLIC BLOOD PRESSURE: 112 MMHG

## 2025-03-10 DIAGNOSIS — G47.00 INSOMNIA, UNSPECIFIED TYPE: ICD-10-CM

## 2025-03-10 DIAGNOSIS — F41.9 ANXIETY: ICD-10-CM

## 2025-03-10 DIAGNOSIS — R23.2 HOT FLASHES: ICD-10-CM

## 2025-03-10 PROCEDURE — 99213 OFFICE O/P EST LOW 20 MIN: CPT | Performed by: FAMILY MEDICINE

## 2025-03-10 PROCEDURE — 3078F DIAST BP <80 MM HG: CPT | Performed by: FAMILY MEDICINE

## 2025-03-10 PROCEDURE — 3074F SYST BP LT 130 MM HG: CPT | Performed by: FAMILY MEDICINE

## 2025-03-10 RX ORDER — BUSPIRONE HYDROCHLORIDE 10 MG/1
10 TABLET ORAL 2 TIMES DAILY
Qty: 60 TABLET | Refills: 0 | Status: SHIPPED | OUTPATIENT
Start: 2025-03-10 | End: 2025-03-31

## 2025-03-10 RX ORDER — ESTRADIOL 0.1 MG/D
1 FILM, EXTENDED RELEASE TRANSDERMAL
Qty: 12 PATCH | Refills: 3 | Status: SHIPPED | OUTPATIENT
Start: 2025-03-10

## 2025-03-10 RX ORDER — TRAZODONE HYDROCHLORIDE 100 MG/1
50 TABLET ORAL NIGHTLY
Qty: 45 TABLET | Refills: 1 | Status: SHIPPED | OUTPATIENT
Start: 2025-03-10
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
## 2025-03-21 RX ORDER — LEVOTHYROXINE SODIUM 100 UG/1
100 TABLET ORAL
Qty: 90 TABLET | Refills: 0 | Status: SHIPPED | OUTPATIENT
Start: 2025-03-21

## 2025-03-30 DIAGNOSIS — F41.9 ANXIETY: ICD-10-CM

## 2025-03-31 RX ORDER — BUSPIRONE HYDROCHLORIDE 10 MG/1
10 TABLET ORAL 2 TIMES DAILY
Qty: 60 TABLET | Refills: 0 | Status: SHIPPED | OUTPATIENT
Start: 2025-03-31

## 2025-03-31 NOTE — TELEPHONE ENCOUNTER
Received request via: Patient    Was the patient seen in the last year in this department? Yes    Does the patient have an active prescription (recently filled or refills available) for medication(s) requested? No    Pharmacy Name: [facility]/pharmacy #[id number] - [place] - [address]      Does the patient have correction Plus and need 100-day supply? (This applies to ALL medications) Patient does not have SCP

## 2025-04-01 DIAGNOSIS — G47.01 INSOMNIA DUE TO MEDICAL CONDITION: ICD-10-CM

## 2025-04-01 RX ORDER — TRAZODONE HYDROCHLORIDE 100 MG/1
250 TABLET ORAL NIGHTLY
Qty: 75 TABLET | Refills: 0 | Status: SHIPPED | OUTPATIENT
Start: 2025-04-01 | End: 2025-04-24 | Stop reason: SDUPTHER

## 2025-04-01 RX ORDER — TRAZODONE HYDROCHLORIDE 100 MG/1
250 TABLET ORAL NIGHTLY
Qty: 75 TABLET | Refills: 0 | Status: SHIPPED | OUTPATIENT
Start: 2025-04-01 | End: 2025-04-01 | Stop reason: SDUPTHER

## 2025-04-02 DIAGNOSIS — R23.2 HOT FLASHES: ICD-10-CM

## 2025-04-02 RX ORDER — ESTRADIOL 0.1 MG/D
1 FILM, EXTENDED RELEASE TRANSDERMAL
Qty: 24 PATCH | Refills: 3 | Status: SHIPPED | OUTPATIENT
Start: 2025-04-02 | End: 2025-04-05

## 2025-04-02 RX ORDER — ESTRADIOL 0.1 MG/D
1 FILM, EXTENDED RELEASE TRANSDERMAL
Qty: 12 PATCH | Refills: 3 | Status: SHIPPED | OUTPATIENT
Start: 2025-04-02 | End: 2025-04-02 | Stop reason: SDUPTHER

## 2025-04-05 DIAGNOSIS — Z78.0 MENOPAUSE: ICD-10-CM

## 2025-04-05 DIAGNOSIS — R23.2 HOT FLASHES: ICD-10-CM

## 2025-04-05 RX ORDER — ESTRADIOL 0.1 MG/D
1 FILM, EXTENDED RELEASE TRANSDERMAL
Qty: 24 PATCH | Refills: 3 | Status: SHIPPED | OUTPATIENT
Start: 2025-04-05

## 2025-04-05 RX ORDER — ESTRADIOL 0.1 MG/D
1 FILM, EXTENDED RELEASE TRANSDERMAL
Qty: 12 PATCH | Refills: 3 | Status: SHIPPED | OUTPATIENT
Start: 2025-04-05 | End: 2025-04-05

## 2025-04-24 DIAGNOSIS — G47.01 INSOMNIA DUE TO MEDICAL CONDITION: ICD-10-CM

## 2025-04-24 RX ORDER — TRAZODONE HYDROCHLORIDE 100 MG/1
250 TABLET ORAL NIGHTLY
Qty: 75 TABLET | Refills: 2 | Status: SHIPPED | OUTPATIENT
Start: 2025-04-24 | End: 2025-07-23

## 2025-04-29 DIAGNOSIS — F41.9 ANXIETY: ICD-10-CM

## 2025-04-29 RX ORDER — BUSPIRONE HYDROCHLORIDE 10 MG/1
10 TABLET ORAL 2 TIMES DAILY
Qty: 180 TABLET | Refills: 0 | Status: SHIPPED | OUTPATIENT
Start: 2025-04-29

## 2025-05-01 ENCOUNTER — APPOINTMENT (OUTPATIENT)
Dept: MEDICAL GROUP | Facility: PHYSICIAN GROUP | Age: 45
End: 2025-05-01
Payer: COMMERCIAL

## 2025-05-01 VITALS
HEART RATE: 85 BPM | OXYGEN SATURATION: 97 % | BODY MASS INDEX: 27.43 KG/M2 | WEIGHT: 154.8 LBS | TEMPERATURE: 97.8 F | HEIGHT: 63 IN | DIASTOLIC BLOOD PRESSURE: 68 MMHG | SYSTOLIC BLOOD PRESSURE: 112 MMHG

## 2025-05-01 DIAGNOSIS — E03.9 HYPOTHYROIDISM, UNSPECIFIED TYPE: ICD-10-CM

## 2025-05-01 DIAGNOSIS — T78.40XA ALLERGY, INITIAL ENCOUNTER: ICD-10-CM

## 2025-05-01 DIAGNOSIS — E66.3 OVERWEIGHT: ICD-10-CM

## 2025-05-01 DIAGNOSIS — Z00.00 HEALTHCARE MAINTENANCE: ICD-10-CM

## 2025-05-01 DIAGNOSIS — E78.2 MIXED HYPERLIPIDEMIA: ICD-10-CM

## 2025-05-01 PROCEDURE — 99214 OFFICE O/P EST MOD 30 MIN: CPT | Performed by: FAMILY MEDICINE

## 2025-05-01 PROCEDURE — 3078F DIAST BP <80 MM HG: CPT | Performed by: FAMILY MEDICINE

## 2025-05-01 PROCEDURE — 3074F SYST BP LT 130 MM HG: CPT | Performed by: FAMILY MEDICINE

## 2025-05-01 RX ORDER — PHENTERMINE HYDROCHLORIDE 30 MG/1
30 CAPSULE ORAL EVERY MORNING
Qty: 90 CAPSULE | Refills: 0 | Status: CANCELLED | OUTPATIENT
Start: 2025-05-05 | End: 2025-08-03

## 2025-05-01 RX ORDER — ATORVASTATIN CALCIUM 40 MG/1
40 TABLET, FILM COATED ORAL NIGHTLY
Qty: 100 TABLET | Refills: 3 | Status: CANCELLED | OUTPATIENT
Start: 2025-05-01 | End: 2026-06-05

## 2025-05-01 RX ORDER — ATORVASTATIN CALCIUM 20 MG/1
20 TABLET, FILM COATED ORAL NIGHTLY
Qty: 100 TABLET | Refills: 3 | Status: SHIPPED | OUTPATIENT
Start: 2025-05-01 | End: 2026-06-05

## 2025-05-01 RX ORDER — PHENTERMINE HYDROCHLORIDE 37.5 MG/1
37.5 CAPSULE ORAL EVERY MORNING
Qty: 90 CAPSULE | Refills: 0 | Status: SHIPPED | OUTPATIENT
Start: 2025-05-01 | End: 2025-07-30

## 2025-05-01 NOTE — PROGRESS NOTES
Verbal consent was acquired by the patient to use [employer] ambient listening note generation during this visit.    Subjective:     HPI:   History of Present Illness  The patient presents for evaluation of myalgia, weight management, edema, and thyroid management.    The patient reports experiencing muscle cramps, which are suspected to be an adverse effect of the current statin therapy, specifically rosuvastatin.    The patient attributes an increase in weight to fluid retention following an epidural steroid injection administered approximately one month ago. This reaction resulted in a significant increase in water weight, totaling 30 pounds. Epidural steroid injections have been administered every 90 days for the past two years, with previous reactions limited to mild edema. The current severe edema has impaired mobility, making ambulation, particularly ascending stairs, difficult. A referral to an allergist is requested for further evaluation. The patient has a herniated disc, but the primary source of pain is attributed to sciatica.    Phentermine has been utilized for weight management, with no reported adverse effects such as tachycardia or insomnia. The patient maintains a regular exercise regimen, working out 5 to 6 days per week, and expresses interest in increasing the phentermine dosage to 37.5 mg.    Edema is noted in the hands and knees, believed to be secondary to limited colonic capacity and subsequent gastrointestinal issues. Management includes the introduction of a new probiotic and vitamin B supplement, which have provided some relief. Edema is exacerbated by the consumption of soda or salt. Diuretics were used for 6 days to manage the edema but have since been discontinued.    The patient is currently on thyroid medication.    Minivelle (estradiol transdermal system) is reported to be effective.    Gabapentin and pregabalin (Lyrica) have been used but are not preferred due to adverse  "effects.    The patient experienced cephalalgia and general malaise on Tuesday and Wednesday, but the condition has improved today.    Health Maintenance: Completed    Objective:     Exam:  /68 (BP Location: Right arm, Patient Position: Sitting, BP Cuff Size: Adult)   Pulse 85   Temp 36.6 °C (97.8 °F) (Temporal)   Ht 1.6 m (5' 3\")   Wt 70.2 kg (154 lb 12.8 oz)   LMP 12/01/2011 (Approximate)   SpO2 97%   BMI 27.42 kg/m²  Body mass index is 27.42 kg/m².    Physical Exam  Vitals reviewed.   Constitutional:       Appearance: Normal appearance.   HENT:      Head: Normocephalic and atraumatic.      Right Ear: External ear normal.      Left Ear: External ear normal.      Nose: Nose normal.   Cardiovascular:      Rate and Rhythm: Normal rate and regular rhythm.      Pulses: Normal pulses.      Heart sounds: Normal heart sounds. No murmur heard.  Pulmonary:      Effort: Pulmonary effort is normal. No respiratory distress.      Breath sounds: Normal breath sounds. No wheezing.   Abdominal:      General: Abdomen is flat.      Palpations: Abdomen is soft.   Skin:     General: Skin is warm and dry.   Neurological:      Mental Status: She is alert and oriented to person, place, and time.   Psychiatric:         Mood and Affect: Mood normal.         Behavior: Behavior normal.             Results      Assessment & Plan:     1. Overweight  phentermine 37.5 MG capsule      2. Allergy, initial encounter  Referral to Allergy      3. Healthcare maintenance  Lipid Profile    Comp Metabolic Panel    CBC WITHOUT DIFFERENTIAL    HEMOGLOBIN A1C      4. Mixed hyperlipidemia  Lipid Profile    atorvastatin (LIPITOR) 20 MG Tab      5. Hypothyroidism, unspecified type  TSH WITH REFLEX TO FT4          Assessment & Plan  1. Muscle cramps.  - The muscle cramps are likely due to the current statin medication, rosuvastatin (Crestor).  - A switch to Lipitor will be made to see if it resolves the symptoms.  - If similar symptoms persist, she " should inform the office.  - If two statins cause similar reactions, Zetia, a non-statin cholesterol medication, may be considered.    2. Weight management.  - She is due for a phentermine refill and wishes to continue.  - Her weight has returned to baseline despite recent water retention.  - The dosage of phentermine will be increased to 37.5 mg as she feels she has plateaued.  - She is advised to continue her current exercise regimen of 5-6 days a week.    3. Water retention.  - The water retention could be due to various causes, including kidney, heart, or gut issues.  - It is also possible that elevated blood sugar levels or steroids could be contributing factors.  - A comprehensive set of labs will be ordered, including complete blood count, A1c, cholesterol, and thyroid function tests.  - An allergy referral will be provided. She is advised to continue her current probiotic and vitamin B supplements.    4. Thyroid management.  - The last thyroid function test showed slightly low active hormone levels.  - The thyroid function will be rechecked to ensure the current medication dosage is adequate.  - She is currently on 100 mcg of thyroid medication.  - The two previous thyroid function tests were normal.    5. Medication management.  - She is currently on Minivelle and reports it is working well.  - She is not taking BuSpar, and it will be removed from her medication list.  - Blood pressure and heart rate are within normal limits.  - Labs will be updated to include electrolytes and sugar levels to investigate water retention.    6. Sciatica.  - She has sciatica and reports that gabapentin and Lyrica make her feel weird.  - She prefers not to take these medications.  - No surgical intervention is planned as the pain is nerve-related.    Follow-up  - The patient will follow up in 3 months.          Return in about 3 months (around 8/1/2025) for Controlled substance.    Please note that this dictation was created  using voice recognition software. I have made every reasonable attempt to correct obvious errors, but I expect that there are errors of grammar and possibly content that I did not discover before finalizing the note.    Helga Owusu MD  Family Medicine and Non - Operative Sports Medicine   [facility]

## 2025-05-10 ENCOUNTER — HOSPITAL ENCOUNTER (OUTPATIENT)
Dept: LAB | Facility: MEDICAL CENTER | Age: 45
End: 2025-05-10
Attending: FAMILY MEDICINE
Payer: COMMERCIAL

## 2025-05-10 DIAGNOSIS — E03.9 HYPOTHYROIDISM, UNSPECIFIED TYPE: ICD-10-CM

## 2025-05-10 DIAGNOSIS — Z00.00 HEALTHCARE MAINTENANCE: ICD-10-CM

## 2025-05-10 DIAGNOSIS — E78.2 MIXED HYPERLIPIDEMIA: ICD-10-CM

## 2025-05-10 LAB
ALBUMIN SERPL BCP-MCNC: 4 G/DL (ref 3.2–4.9)
ALBUMIN/GLOB SERPL: 1.7 G/DL
ALP SERPL-CCNC: 73 U/L (ref 30–99)
ALT SERPL-CCNC: 35 U/L (ref 2–50)
ANION GAP SERPL CALC-SCNC: 6 MMOL/L (ref 7–16)
AST SERPL-CCNC: 25 U/L (ref 12–45)
BILIRUB SERPL-MCNC: 0.2 MG/DL (ref 0.1–1.5)
BUN SERPL-MCNC: 11 MG/DL (ref 8–22)
CALCIUM ALBUM COR SERPL-MCNC: 9.2 MG/DL (ref 8.5–10.5)
CALCIUM SERPL-MCNC: 9.2 MG/DL (ref 8.5–10.5)
CHLORIDE SERPL-SCNC: 106 MMOL/L (ref 96–112)
CHOLEST SERPL-MCNC: 200 MG/DL (ref 100–199)
CO2 SERPL-SCNC: 27 MMOL/L (ref 20–33)
CREAT SERPL-MCNC: 0.92 MG/DL (ref 0.5–1.4)
ERYTHROCYTE [DISTWIDTH] IN BLOOD BY AUTOMATED COUNT: 44.6 FL (ref 35.9–50)
EST. AVERAGE GLUCOSE BLD GHB EST-MCNC: 117 MG/DL
GFR SERPLBLD CREATININE-BSD FMLA CKD-EPI: 79 ML/MIN/1.73 M 2
GLOBULIN SER CALC-MCNC: 2.4 G/DL (ref 1.9–3.5)
GLUCOSE SERPL-MCNC: 109 MG/DL (ref 65–99)
HBA1C MFR BLD: 5.7 % (ref 4–5.6)
HCT VFR BLD AUTO: 39.5 % (ref 37–47)
HDLC SERPL-MCNC: 57 MG/DL
HGB BLD-MCNC: 12.5 G/DL (ref 12–16)
LDLC SERPL CALC-MCNC: 114 MG/DL
MCH RBC QN AUTO: 30.8 PG (ref 27–33)
MCHC RBC AUTO-ENTMCNC: 31.6 G/DL (ref 32.2–35.5)
MCV RBC AUTO: 97.3 FL (ref 81.4–97.8)
PLATELET # BLD AUTO: 211 K/UL (ref 164–446)
PMV BLD AUTO: 10 FL (ref 9–12.9)
POTASSIUM SERPL-SCNC: 4.3 MMOL/L (ref 3.6–5.5)
PROT SERPL-MCNC: 6.4 G/DL (ref 6–8.2)
RBC # BLD AUTO: 4.06 M/UL (ref 4.2–5.4)
SODIUM SERPL-SCNC: 139 MMOL/L (ref 135–145)
TRIGL SERPL-MCNC: 144 MG/DL (ref 0–149)
TSH SERPL DL<=0.005 MIU/L-ACNC: 0.72 UIU/ML (ref 0.38–5.33)
WBC # BLD AUTO: 4.9 K/UL (ref 4.8–10.8)

## 2025-05-10 PROCEDURE — 83036 HEMOGLOBIN GLYCOSYLATED A1C: CPT

## 2025-05-10 PROCEDURE — 85027 COMPLETE CBC AUTOMATED: CPT

## 2025-05-10 PROCEDURE — 36415 COLL VENOUS BLD VENIPUNCTURE: CPT

## 2025-05-10 PROCEDURE — 80061 LIPID PANEL: CPT

## 2025-05-10 PROCEDURE — 80053 COMPREHEN METABOLIC PANEL: CPT

## 2025-05-10 PROCEDURE — 84443 ASSAY THYROID STIM HORMONE: CPT

## 2025-05-11 ENCOUNTER — PATIENT MESSAGE (OUTPATIENT)
Dept: MEDICAL GROUP | Facility: PHYSICIAN GROUP | Age: 45
End: 2025-05-11
Payer: COMMERCIAL

## 2025-05-11 ENCOUNTER — RESULTS FOLLOW-UP (OUTPATIENT)
Dept: MEDICAL GROUP | Facility: PHYSICIAN GROUP | Age: 45
End: 2025-05-11

## 2025-05-22 ENCOUNTER — OFFICE VISIT (OUTPATIENT)
Dept: MEDICAL GROUP | Facility: PHYSICIAN GROUP | Age: 45
End: 2025-05-22
Payer: COMMERCIAL

## 2025-05-22 ENCOUNTER — HOSPITAL ENCOUNTER (OUTPATIENT)
Dept: RADIOLOGY | Facility: MEDICAL CENTER | Age: 45
End: 2025-05-22
Attending: FAMILY MEDICINE
Payer: COMMERCIAL

## 2025-05-22 VITALS
TEMPERATURE: 98.1 F | OXYGEN SATURATION: 98 % | HEIGHT: 63 IN | SYSTOLIC BLOOD PRESSURE: 112 MMHG | BODY MASS INDEX: 27.96 KG/M2 | WEIGHT: 157.8 LBS | HEART RATE: 95 BPM | DIASTOLIC BLOOD PRESSURE: 70 MMHG

## 2025-05-22 DIAGNOSIS — Z90.49 S/P COLON RESECTION: ICD-10-CM

## 2025-05-22 DIAGNOSIS — R10.32 LEFT LOWER QUADRANT PAIN: Primary | ICD-10-CM

## 2025-05-22 DIAGNOSIS — R10.32 LEFT LOWER QUADRANT PAIN: ICD-10-CM

## 2025-05-22 PROCEDURE — 3078F DIAST BP <80 MM HG: CPT | Performed by: FAMILY MEDICINE

## 2025-05-22 PROCEDURE — 3074F SYST BP LT 130 MM HG: CPT | Performed by: FAMILY MEDICINE

## 2025-05-22 PROCEDURE — 74176 CT ABD & PELVIS W/O CONTRAST: CPT

## 2025-05-22 PROCEDURE — 99213 OFFICE O/P EST LOW 20 MIN: CPT | Performed by: FAMILY MEDICINE

## 2025-05-22 ASSESSMENT — FIBROSIS 4 INDEX: FIB4 SCORE: 0.86

## 2025-05-22 NOTE — PROGRESS NOTES
"Verbal consent was acquired by the patient to use [employer] ambient listening note generation during this visit.    Subjective:     HPI:   History of Present Illness  The patient presents for evaluation of persistent left-sided abdominal pain, which has been ongoing for the past 9 days. She initially suspected the pain might be related to a hernia. The pain is described as throbbing and gurgling, accompanied by a sensation of her heartbeat in the affected area. She denies specific points of tenderness but notes that the area becomes distended following ingestion of food or liquids. The pain is constant and does not fluctuate with physical activity, including gym workouts or light weightlifting. She retains full mobility and is able to perform sit-ups and bend forward without discomfort. She denies pain during bowel movements, sit-ups, or straining, and reports no history of injury related to heavy lifting.    The patient also reports increased bowel movement frequency, with up to four episodes of diarrhea occurring this morning, and occasional nausea without emesis. She has avoided the use of antidiarrheal medications such as loperamide due to concerns about constipation. She has been maintaining adequate hydration. She denies fever, chills, or anorexia but notes that eating exacerbates the abdominal pain.    Her medical history includes a right-sided colon resection, and she has not undergone a colonoscopy since the procedure. She denies a history of diverticulosis or left-sided hernia.    PAST SURGICAL HISTORY:  - Right-sided colon resection  - Multiple surgeries (details unspecified)    Health Maintenance: Completed    Objective:     Exam:  /70 (BP Location: Right arm, Patient Position: Sitting, BP Cuff Size: Adult)   Pulse 95   Temp 36.7 °C (98.1 °F) (Temporal)   Ht 1.6 m (5' 3\")   Wt 71.6 kg (157 lb 12.8 oz)   LMP 12/01/2011 (Approximate)   SpO2 98%   BMI 27.95 kg/m²  Body mass index is 27.95 " kg/m².    Physical Exam  Vitals reviewed.   Constitutional:       Appearance: Normal appearance.   HENT:      Head: Normocephalic and atraumatic.      Right Ear: External ear normal.      Left Ear: External ear normal.      Nose: Nose normal.   Cardiovascular:      Rate and Rhythm: Normal rate and regular rhythm.      Pulses: Normal pulses.      Heart sounds: Normal heart sounds. No murmur heard.  Pulmonary:      Effort: Pulmonary effort is normal. No respiratory distress.      Breath sounds: Normal breath sounds. No wheezing.   Abdominal:      General: Abdomen is flat. There is distension.      Palpations: Abdomen is soft. There is no mass.      Tenderness: There is abdominal tenderness. There is no guarding or rebound.   Skin:     General: Skin is warm and dry.   Neurological:      Mental Status: She is alert and oriented to person, place, and time.   Psychiatric:         Mood and Affect: Mood normal.         Behavior: Behavior normal.             Results      Assessment & Plan:     1. Left lower quadrant pain  CT-ABDOMEN-PELVIS W/O      2. S/P colon resection  CT-ABDOMEN-PELVIS W/O          Assessment & Plan  1. LLQ Abdominal pain.  - The clinical presentation suggests a potential hernia, particularly given the absence of prior diverticulosis despite extensive colon investigations.  - The pain is exacerbated by eating and drinking and is associated with throbbing and gurgling sensations. There is no history of injury or specific pain during physical activities like sit-ups or straining.  - A stat CT scan with oral contrast will be ordered to further evaluate the condition.  - She is advised to use heating pads and avoid strenuous activities. Dietary modifications include avoiding seeds and nuts for the next few days, maintaining a high-fiber diet indefinitely, and ensuring adequate hydration. Over-the-counter anti-inflammatories such as Tylenol may be used for pain management. If the CT scan reveals  diverticulitis, an antibiotic regimen will be initiated.          Return in about 2 weeks (around 6/5/2025) for f/up testing.    Please note that this dictation was created using voice recognition software. I have made every reasonable attempt to correct obvious errors, but I expect that there are errors of grammar and possibly content that I did not discover before finalizing the note.    Helga Owusu MD  Family Medicine and Non - Operative Sports Medicine   [facility]

## 2025-05-26 ENCOUNTER — RESULTS FOLLOW-UP (OUTPATIENT)
Dept: MEDICAL GROUP | Facility: PHYSICIAN GROUP | Age: 45
End: 2025-05-26

## 2025-07-14 ENCOUNTER — OFFICE VISIT (OUTPATIENT)
Dept: MEDICAL GROUP | Facility: PHYSICIAN GROUP | Age: 45
End: 2025-07-14
Payer: COMMERCIAL

## 2025-07-14 VITALS
OXYGEN SATURATION: 98 % | WEIGHT: 157.6 LBS | SYSTOLIC BLOOD PRESSURE: 108 MMHG | DIASTOLIC BLOOD PRESSURE: 68 MMHG | HEART RATE: 87 BPM | TEMPERATURE: 99.5 F | BODY MASS INDEX: 27.93 KG/M2 | HEIGHT: 63 IN | RESPIRATION RATE: 14 BRPM

## 2025-07-14 DIAGNOSIS — H53.8 BLURRY VISION, BILATERAL: Primary | ICD-10-CM

## 2025-07-14 DIAGNOSIS — R09.82 POST-NASAL DRAINAGE: ICD-10-CM

## 2025-07-14 DIAGNOSIS — J30.2 SEASONAL ALLERGIES: ICD-10-CM

## 2025-07-14 PROCEDURE — 3078F DIAST BP <80 MM HG: CPT | Performed by: FAMILY MEDICINE

## 2025-07-14 PROCEDURE — 3074F SYST BP LT 130 MM HG: CPT | Performed by: FAMILY MEDICINE

## 2025-07-14 PROCEDURE — 99214 OFFICE O/P EST MOD 30 MIN: CPT | Performed by: FAMILY MEDICINE

## 2025-07-14 ASSESSMENT — FIBROSIS 4 INDEX: FIB4 SCORE: 0.86

## 2025-07-14 NOTE — PROGRESS NOTES
Verbal consent was acquired by the patient to use [employer] ambient listening note generation during this visit.    Subjective:     HPI:   History of Present Illness  The patient presents for evaluation of periorbital edema, visual disturbance, cephalalgia, and thoracic congestion.    The patient recently returned from a trip to [facility], during which she experienced periorbital edema upon waking on Saturday, 07/12/2025. Initially attributing this to fatigue due to an early morning flight, she observed that the edema had intensified by the following day, with her eyes exhibiting ecchymosis and significant swelling, nearly occluding her vision. Although the edema has since decreased, it persists and is accompanied by severe visual disturbance. She denies ocular pain, difficulty with eye movements, trauma, excessive rubbing, pruritus, erythema, warmth, or discharge. The edema persisted throughout the day. Her last ophthalmology visit was less than 3 months ago, during which no concerns were identified, and she was prescribed reading glasses. She has not recently consulted an optometrist. She reports no correlation between the visual disturbance and cephalalgia. She is not currently on any antibiotics and has not altered her medication regimen. No other family members exhibit similar symptoms. She reports no symptom development following swimming or snorkeling activities. She does not sleep in a prone position.    Additionally, the patient reports severe cephalalgia and thoracic congestion. She has been experiencing postnasal drip for the past 3 days, which has since decreased. She describes a sensation of heaviness in her chest but denies productive cough. She is not currently utilizing nasal rinses. She has not had any recent allergic reactions, although she did take diphenhydramine for mosquito bites during her trip.    Health Maintenance: Completed    Objective:     Exam:  /68 (BP Location: Left arm, Patient  "Position: Sitting, BP Cuff Size: Adult)   Pulse 87   Temp 37.5 °C (99.5 °F) (Temporal)   Resp 14   Ht 1.6 m (5' 3\")   Wt 71.5 kg (157 lb 9.6 oz)   LMP 12/01/2011 (Approximate)   SpO2 98%   BMI 27.92 kg/m²  Body mass index is 27.92 kg/m².    Physical Exam  Vitals reviewed.   Constitutional:       Appearance: Normal appearance.   HENT:      Head: Normocephalic and atraumatic.      Right Ear: External ear normal.      Left Ear: External ear normal.      Nose: Nose normal. No congestion or rhinorrhea.      Mouth/Throat:      Mouth: Mucous membranes are moist.   Eyes:      Extraocular Movements: Extraocular movements intact.      Pupils: Pupils are equal, round, and reactive to light.   Cardiovascular:      Rate and Rhythm: Normal rate and regular rhythm.      Pulses: Normal pulses.      Heart sounds: Normal heart sounds. No murmur heard.  Pulmonary:      Effort: Pulmonary effort is normal. No respiratory distress.      Breath sounds: Normal breath sounds. No wheezing.   Abdominal:      General: Abdomen is flat. Bowel sounds are normal. There is no distension.      Palpations: Abdomen is soft.      Tenderness: There is no abdominal tenderness.   Musculoskeletal:      Cervical back: Neck supple.   Skin:     General: Skin is warm and dry.      Capillary Refill: Capillary refill takes less than 2 seconds.   Neurological:      General: No focal deficit present.      Mental Status: She is alert and oriented to person, place, and time.   Psychiatric:         Mood and Affect: Mood normal.         Behavior: Behavior normal.       Mild pain with eye rom  No erythema, edema, ecchymosis   EOM intact, PERRLA  TTP along the frontal sinus   NO LAD         Results      Assessment & Plan:     1. Blurry vision, bilateral        2. Seasonal allergies        3. Post-nasal drainage            Assessment & Plan  1. Blurry vision.  - The new onset of blurry vision is concerning, especially with the associated swelling.  - There is no " visible bruising or bags under the eyes, and the swelling does not appear severe today. The symptoms do not align with a sinus infection, which typically causes internal swelling and headaches, but not bruising or blurry vision. The absence of conjunctivitis is noted.  - The sinuses may be inflamed due to a viral infection, causing headaches and pressure behind the eyes. Postnasal drip could be contributing to chest congestion. Vital signs are within normal limits, and oxygen saturation is satisfactory, suggesting no lung involvement. Eye motion is normal, but there is some pain, possibly originating from the frontal sinuses. The intermittent blurry vision is a concern. Preseptal cellulitis or orbital cellulitis is not suspected.  - She is advised to consult an optometrist for an eye exam to rule out any urgent issues. Over-the-counter remedies such as a neti pot, saline rinses, and a humidifier at night are recommended. If symptoms persist for another week, she should return or send a message so that antibiotics can be prescribed.    2. Headache.  - The headache may be related to sinus inflammation or a viral infection.  - No visible bruising or bags under the eyes, and the swelling does not appear severe today.  - Advised to use over-the-counter remedies such as a neti pot, saline rinses, and a humidifier at night to alleviate symptoms.  - If symptoms persist for another week, she should return or send a message so that antibiotics can be prescribed.    3. Chest congestion.  - The chest congestion may be due to postnasal drip from sinus inflammation.  - No abnormal findings in the lungs, no wheezes, no crackles, and no conjunctivitis.  - Advised to use over-the-counter remedies such as a neti pot, saline rinses, and a humidifier at night to alleviate symptoms.  - If symptoms persist for another week, she should return or send a message so that antibiotics can be prescribed.        Return in about 10 days (around  7/24/2025), or if symptoms worsen or fail to improve.    Please note that this dictation was created using voice recognition software. I have made every reasonable attempt to correct obvious errors, but I expect that there are errors of grammar and possibly content that I did not discover before finalizing the note.    Helga Owusu MD  Family Medicine and Non - Operative Sports Medicine   [facility]

## 2025-07-28 RX ORDER — LEVOTHYROXINE SODIUM 100 UG/1
100 TABLET ORAL
Qty: 90 TABLET | Refills: 0 | Status: SHIPPED | OUTPATIENT
Start: 2025-07-28

## 2025-07-28 NOTE — TELEPHONE ENCOUNTER
Received request via: Pharmacy    Was the patient seen in the last year in this department? Yes    Does the patient have an active prescription (recently filled or refills available) for medication(s) requested? No    Pharmacy Name: [facility] [address]    Does the patient have custodial Plus and need 100-day supply? (This applies to ALL medications) Patient does not have SCP

## 2025-08-11 ENCOUNTER — HOSPITAL ENCOUNTER (OUTPATIENT)
Facility: MEDICAL CENTER | Age: 45
End: 2025-08-12
Attending: EMERGENCY MEDICINE | Admitting: HOSPITALIST
Payer: COMMERCIAL

## 2025-08-11 ENCOUNTER — APPOINTMENT (OUTPATIENT)
Dept: RADIOLOGY | Facility: MEDICAL CENTER | Age: 45
End: 2025-08-11
Attending: EMERGENCY MEDICINE
Payer: COMMERCIAL

## 2025-08-11 DIAGNOSIS — R55 SYNCOPE, UNSPECIFIED SYNCOPE TYPE: ICD-10-CM

## 2025-08-11 DIAGNOSIS — R07.2 PRECORDIAL CHEST PAIN: Primary | ICD-10-CM

## 2025-08-11 PROBLEM — R00.0 TACHYCARDIA: Status: ACTIVE | Noted: 2025-08-11

## 2025-08-11 PROBLEM — Z86.59 HISTORY OF PANIC ATTACKS: Status: ACTIVE | Noted: 2025-08-11

## 2025-08-11 PROBLEM — R07.9 CHEST PAIN: Status: ACTIVE | Noted: 2025-08-11

## 2025-08-11 LAB
ALBUMIN SERPL BCP-MCNC: 4.5 G/DL (ref 3.2–4.9)
ALBUMIN/GLOB SERPL: 1.7 G/DL
ALP SERPL-CCNC: 67 U/L (ref 30–99)
ALT SERPL-CCNC: 26 U/L (ref 2–50)
ANION GAP SERPL CALC-SCNC: 13 MMOL/L (ref 7–16)
AST SERPL-CCNC: 27 U/L (ref 12–45)
BASOPHILS # BLD AUTO: 0.8 % (ref 0–1.8)
BASOPHILS # BLD: 0.05 K/UL (ref 0–0.12)
BILIRUB SERPL-MCNC: 0.4 MG/DL (ref 0.1–1.5)
BUN SERPL-MCNC: 9 MG/DL (ref 8–22)
CALCIUM ALBUM COR SERPL-MCNC: 9.3 MG/DL (ref 8.5–10.5)
CALCIUM SERPL-MCNC: 9.7 MG/DL (ref 8.5–10.5)
CHLORIDE SERPL-SCNC: 104 MMOL/L (ref 96–112)
CO2 SERPL-SCNC: 17 MMOL/L (ref 20–33)
CREAT SERPL-MCNC: 0.92 MG/DL (ref 0.5–1.4)
D DIMER PPP IA.FEU-MCNC: 0.29 UG/ML (FEU) (ref 0–0.5)
EKG IMPRESSION: NORMAL
EOSINOPHIL # BLD AUTO: 0.14 K/UL (ref 0–0.51)
EOSINOPHIL NFR BLD: 2.2 % (ref 0–6.9)
ERYTHROCYTE [DISTWIDTH] IN BLOOD BY AUTOMATED COUNT: 41.6 FL (ref 35.9–50)
GFR SERPLBLD CREATININE-BSD FMLA CKD-EPI: 79 ML/MIN/1.73 M 2
GLOBULIN SER CALC-MCNC: 2.7 G/DL (ref 1.9–3.5)
GLUCOSE SERPL-MCNC: 105 MG/DL (ref 65–99)
HCT VFR BLD AUTO: 37.6 % (ref 37–47)
HGB BLD-MCNC: 12.8 G/DL (ref 12–16)
IMM GRANULOCYTES # BLD AUTO: 0.02 K/UL (ref 0–0.11)
IMM GRANULOCYTES NFR BLD AUTO: 0.3 % (ref 0–0.9)
LYMPHOCYTES # BLD AUTO: 1.63 K/UL (ref 1–4.8)
LYMPHOCYTES NFR BLD: 25.7 % (ref 22–41)
MAGNESIUM SERPL-MCNC: 1.9 MG/DL (ref 1.5–2.5)
MCH RBC QN AUTO: 30.5 PG (ref 27–33)
MCHC RBC AUTO-ENTMCNC: 34 G/DL (ref 32.2–35.5)
MCV RBC AUTO: 89.5 FL (ref 81.4–97.8)
MONOCYTES # BLD AUTO: 0.38 K/UL (ref 0–0.85)
MONOCYTES NFR BLD AUTO: 6 % (ref 0–13.4)
NEUTROPHILS # BLD AUTO: 4.12 K/UL (ref 1.82–7.42)
NEUTROPHILS NFR BLD: 65 % (ref 44–72)
NRBC # BLD AUTO: 0 K/UL
NRBC BLD-RTO: 0 /100 WBC (ref 0–0.2)
NT-PROBNP SERPL IA-MCNC: 45 PG/ML (ref 0–125)
PLATELET # BLD AUTO: 239 K/UL (ref 164–446)
PMV BLD AUTO: 9.2 FL (ref 9–12.9)
POTASSIUM SERPL-SCNC: 3.6 MMOL/L (ref 3.6–5.5)
PROT SERPL-MCNC: 7.2 G/DL (ref 6–8.2)
RBC # BLD AUTO: 4.2 M/UL (ref 4.2–5.4)
SODIUM SERPL-SCNC: 134 MMOL/L (ref 135–145)
TROPONIN T SERPL-MCNC: 7 NG/L (ref 6–19)
TROPONIN T SERPL-MCNC: <6 NG/L (ref 6–19)
TSH SERPL DL<=0.005 MIU/L-ACNC: 0.67 UIU/ML (ref 0.38–5.33)
WBC # BLD AUTO: 6.3 K/UL (ref 4.8–10.8)

## 2025-08-11 PROCEDURE — 700102 HCHG RX REV CODE 250 W/ 637 OVERRIDE(OP)

## 2025-08-11 PROCEDURE — 80053 COMPREHEN METABOLIC PANEL: CPT

## 2025-08-11 PROCEDURE — 96372 THER/PROPH/DIAG INJ SC/IM: CPT

## 2025-08-11 PROCEDURE — 99285 EMERGENCY DEPT VISIT HI MDM: CPT

## 2025-08-11 PROCEDURE — 700111 HCHG RX REV CODE 636 W/ 250 OVERRIDE (IP): Mod: JZ

## 2025-08-11 PROCEDURE — 84443 ASSAY THYROID STIM HORMONE: CPT

## 2025-08-11 PROCEDURE — 93005 ELECTROCARDIOGRAM TRACING: CPT | Mod: TC

## 2025-08-11 PROCEDURE — 93005 ELECTROCARDIOGRAM TRACING: CPT | Mod: TC | Performed by: EMERGENCY MEDICINE

## 2025-08-11 PROCEDURE — G0378 HOSPITAL OBSERVATION PER HR: HCPCS

## 2025-08-11 PROCEDURE — 85025 COMPLETE CBC W/AUTO DIFF WBC: CPT

## 2025-08-11 PROCEDURE — 99223 1ST HOSP IP/OBS HIGH 75: CPT | Performed by: HOSPITALIST

## 2025-08-11 PROCEDURE — A9270 NON-COVERED ITEM OR SERVICE: HCPCS

## 2025-08-11 PROCEDURE — 83735 ASSAY OF MAGNESIUM: CPT

## 2025-08-11 PROCEDURE — 96374 THER/PROPH/DIAG INJ IV PUSH: CPT

## 2025-08-11 PROCEDURE — 71045 X-RAY EXAM CHEST 1 VIEW: CPT

## 2025-08-11 PROCEDURE — 83880 ASSAY OF NATRIURETIC PEPTIDE: CPT

## 2025-08-11 PROCEDURE — 96376 TX/PRO/DX INJ SAME DRUG ADON: CPT

## 2025-08-11 PROCEDURE — 85379 FIBRIN DEGRADATION QUANT: CPT

## 2025-08-11 PROCEDURE — 700111 HCHG RX REV CODE 636 W/ 250 OVERRIDE (IP): Mod: JZ | Performed by: EMERGENCY MEDICINE

## 2025-08-11 PROCEDURE — 84484 ASSAY OF TROPONIN QUANT: CPT | Mod: 91

## 2025-08-11 PROCEDURE — 96375 TX/PRO/DX INJ NEW DRUG ADDON: CPT

## 2025-08-11 PROCEDURE — 36415 COLL VENOUS BLD VENIPUNCTURE: CPT

## 2025-08-11 RX ORDER — ENOXAPARIN SODIUM 100 MG/ML
40 INJECTION SUBCUTANEOUS DAILY
Status: DISCONTINUED | OUTPATIENT
Start: 2025-08-11 | End: 2025-08-12 | Stop reason: HOSPADM

## 2025-08-11 RX ORDER — KETOROLAC TROMETHAMINE 15 MG/ML
15 INJECTION, SOLUTION INTRAMUSCULAR; INTRAVENOUS ONCE
Status: COMPLETED | OUTPATIENT
Start: 2025-08-11 | End: 2025-08-11

## 2025-08-11 RX ORDER — ASPIRIN 325 MG
325 TABLET ORAL ONCE
Status: COMPLETED | OUTPATIENT
Start: 2025-08-11 | End: 2025-08-11

## 2025-08-11 RX ORDER — LEVOTHYROXINE SODIUM 50 UG/1
100 TABLET ORAL
Status: DISCONTINUED | OUTPATIENT
Start: 2025-08-12 | End: 2025-08-12 | Stop reason: HOSPADM

## 2025-08-11 RX ORDER — PROPRANOLOL HYDROCHLORIDE 10 MG/1
10 TABLET ORAL EVERY 8 HOURS
Status: DISCONTINUED | OUTPATIENT
Start: 2025-08-11 | End: 2025-08-12 | Stop reason: HOSPADM

## 2025-08-11 RX ORDER — ONDANSETRON 2 MG/ML
4 INJECTION INTRAMUSCULAR; INTRAVENOUS ONCE
Status: COMPLETED | OUTPATIENT
Start: 2025-08-11 | End: 2025-08-11

## 2025-08-11 RX ORDER — ASPIRIN 81 MG/1
324 TABLET, CHEWABLE ORAL ONCE
Status: COMPLETED | OUTPATIENT
Start: 2025-08-11 | End: 2025-08-11

## 2025-08-11 RX ORDER — OMEPRAZOLE 20 MG/1
20 CAPSULE, DELAYED RELEASE ORAL DAILY
Status: DISCONTINUED | OUTPATIENT
Start: 2025-08-11 | End: 2025-08-12 | Stop reason: HOSPADM

## 2025-08-11 RX ORDER — ASPIRIN 300 MG/1
300 SUPPOSITORY RECTAL ONCE
Status: COMPLETED | OUTPATIENT
Start: 2025-08-11 | End: 2025-08-11

## 2025-08-11 RX ORDER — ALUMINA, MAGNESIA, AND SIMETHICONE 2400; 2400; 240 MG/30ML; MG/30ML; MG/30ML
10 SUSPENSION ORAL 4 TIMES DAILY PRN
Status: DISCONTINUED | OUTPATIENT
Start: 2025-08-11 | End: 2025-08-12 | Stop reason: HOSPADM

## 2025-08-11 RX ORDER — KETOROLAC TROMETHAMINE 15 MG/ML
15 INJECTION, SOLUTION INTRAMUSCULAR; INTRAVENOUS EVERY 6 HOURS PRN
Status: DISCONTINUED | OUTPATIENT
Start: 2025-08-11 | End: 2025-08-12 | Stop reason: HOSPADM

## 2025-08-11 RX ORDER — B-COMPLEX WITH VITAMIN C
1 TABLET ORAL
COMMUNITY

## 2025-08-11 RX ORDER — ATORVASTATIN CALCIUM 20 MG/1
20 TABLET, FILM COATED ORAL EVERY EVENING
Status: DISCONTINUED | OUTPATIENT
Start: 2025-08-11 | End: 2025-08-12 | Stop reason: HOSPADM

## 2025-08-11 RX ORDER — TRAZODONE HYDROCHLORIDE 100 MG/1
250 TABLET ORAL NIGHTLY
COMMUNITY
End: 2025-08-24

## 2025-08-11 RX ORDER — ASPIRIN 81 MG/1
81 TABLET ORAL DAILY
Status: DISCONTINUED | OUTPATIENT
Start: 2025-08-12 | End: 2025-08-12 | Stop reason: HOSPADM

## 2025-08-11 RX ORDER — HYDROMORPHONE HYDROCHLORIDE 1 MG/ML
0.5 INJECTION, SOLUTION INTRAMUSCULAR; INTRAVENOUS; SUBCUTANEOUS
Status: DISCONTINUED | OUTPATIENT
Start: 2025-08-11 | End: 2025-08-12 | Stop reason: HOSPADM

## 2025-08-11 RX ADMIN — HYDROMORPHONE HYDROCHLORIDE 0.5 MG: 1 INJECTION, SOLUTION INTRAMUSCULAR; INTRAVENOUS; SUBCUTANEOUS at 19:08

## 2025-08-11 RX ADMIN — OMEPRAZOLE 20 MG: 20 CAPSULE, DELAYED RELEASE ORAL at 15:51

## 2025-08-11 RX ADMIN — KETOROLAC TROMETHAMINE 15 MG: 15 INJECTION, SOLUTION INTRAMUSCULAR; INTRAVENOUS at 13:56

## 2025-08-11 RX ADMIN — ATORVASTATIN CALCIUM 20 MG: 20 TABLET, FILM COATED ORAL at 17:59

## 2025-08-11 RX ADMIN — ASPIRIN 324 MG: 81 TABLET, CHEWABLE ORAL at 13:55

## 2025-08-11 RX ADMIN — ALUMINUM HYDROXIDE, MAGNESIUM HYDROXIDE, AND DIMETHICONE 10 ML: 400; 400; 40 SUSPENSION ORAL at 19:08

## 2025-08-11 RX ADMIN — PROPRANOLOL HYDROCHLORIDE 10 MG: 10 TABLET ORAL at 13:55

## 2025-08-11 RX ADMIN — KETOROLAC TROMETHAMINE 15 MG: 15 INJECTION, SOLUTION INTRAMUSCULAR; INTRAVENOUS at 16:16

## 2025-08-11 RX ADMIN — ONDANSETRON 4 MG: 2 INJECTION INTRAMUSCULAR; INTRAVENOUS at 13:56

## 2025-08-11 RX ADMIN — PROPRANOLOL HYDROCHLORIDE 10 MG: 10 TABLET ORAL at 21:59

## 2025-08-11 RX ADMIN — ENOXAPARIN SODIUM 40 MG: 100 INJECTION SUBCUTANEOUS at 18:00

## 2025-08-11 ASSESSMENT — COGNITIVE AND FUNCTIONAL STATUS - GENERAL
HELP NEEDED FOR BATHING: A LOT
DRESSING REGULAR UPPER BODY CLOTHING: A LITTLE
STANDING UP FROM CHAIR USING ARMS: A LOT
DAILY ACTIVITIY SCORE: 18
EATING MEALS: A LITTLE
SUGGESTED CMS G CODE MODIFIER MOBILITY: CK
MOBILITY SCORE: 18
TOILETING: A LITTLE
SUGGESTED CMS G CODE MODIFIER DAILY ACTIVITY: CK
CLIMB 3 TO 5 STEPS WITH RAILING: A LOT
WALKING IN HOSPITAL ROOM: A LOT
PERSONAL GROOMING: A LITTLE

## 2025-08-11 ASSESSMENT — ENCOUNTER SYMPTOMS
MUSCULOSKELETAL NEGATIVE: 1
GASTROINTESTINAL NEGATIVE: 1
PALPITATIONS: 1
PSYCHIATRIC NEGATIVE: 1
RESPIRATORY NEGATIVE: 1
NEUROLOGICAL NEGATIVE: 1
EYES NEGATIVE: 1
CONSTITUTIONAL NEGATIVE: 1

## 2025-08-11 ASSESSMENT — LIFESTYLE VARIABLES
HOW MANY TIMES IN THE PAST YEAR HAVE YOU HAD 5 OR MORE DRINKS IN A DAY: 0
ON A TYPICAL DAY WHEN YOU DRINK ALCOHOL HOW MANY DRINKS DO YOU HAVE: 0
HAVE YOU EVER FELT YOU SHOULD CUT DOWN ON YOUR DRINKING: NO
DOES PATIENT WANT TO STOP DRINKING: NO
AVERAGE NUMBER OF DAYS PER WEEK YOU HAVE A DRINK CONTAINING ALCOHOL: 0
TOTAL SCORE: 0
ALCOHOL_USE: NO
EVER FELT BAD OR GUILTY ABOUT YOUR DRINKING: NO
TOTAL SCORE: 0
TOTAL SCORE: 0
EVER HAD A DRINK FIRST THING IN THE MORNING TO STEADY YOUR NERVES TO GET RID OF A HANGOVER: NO
CONSUMPTION TOTAL: NEGATIVE
HAVE PEOPLE ANNOYED YOU BY CRITICIZING YOUR DRINKING: NO

## 2025-08-11 ASSESSMENT — PAIN DESCRIPTION - PAIN TYPE
TYPE: ACUTE PAIN

## 2025-08-11 ASSESSMENT — HEART SCORE
ECG: NORMAL
HEART SCORE: 3
HISTORY: HIGHLY SUSPICIOUS
TROPONIN: LESS THAN OR EQUAL TO NORMAL LIMIT
AGE: <45
RISK FACTORS: 1-2 RISK FACTORS

## 2025-08-11 ASSESSMENT — FIBROSIS 4 INDEX
FIB4 SCORE: 0.86
FIB4 SCORE: 0.95

## 2025-08-12 ENCOUNTER — APPOINTMENT (OUTPATIENT)
Dept: RADIOLOGY | Facility: MEDICAL CENTER | Age: 45
End: 2025-08-12
Payer: COMMERCIAL

## 2025-08-12 ENCOUNTER — APPOINTMENT (OUTPATIENT)
Dept: CARDIOLOGY | Facility: MEDICAL CENTER | Age: 45
End: 2025-08-12
Attending: HOSPITALIST
Payer: COMMERCIAL

## 2025-08-12 VITALS
HEART RATE: 76 BPM | DIASTOLIC BLOOD PRESSURE: 80 MMHG | WEIGHT: 155.65 LBS | HEIGHT: 63 IN | BODY MASS INDEX: 27.58 KG/M2 | OXYGEN SATURATION: 100 % | SYSTOLIC BLOOD PRESSURE: 116 MMHG | TEMPERATURE: 96.8 F | RESPIRATION RATE: 16 BRPM

## 2025-08-12 PROBLEM — R07.9 CHEST PAIN: Status: RESOLVED | Noted: 2025-08-11 | Resolved: 2025-08-12

## 2025-08-12 PROBLEM — R00.0 TACHYCARDIA: Status: RESOLVED | Noted: 2025-08-11 | Resolved: 2025-08-12

## 2025-08-12 LAB
ANION GAP SERPL CALC-SCNC: 11 MMOL/L (ref 7–16)
BUN SERPL-MCNC: 11 MG/DL (ref 8–22)
CALCIUM SERPL-MCNC: 8.7 MG/DL (ref 8.5–10.5)
CHLORIDE SERPL-SCNC: 106 MMOL/L (ref 96–112)
CHOLEST SERPL-MCNC: 184 MG/DL (ref 100–199)
CO2 SERPL-SCNC: 20 MMOL/L (ref 20–33)
CREAT SERPL-MCNC: 1.01 MG/DL (ref 0.5–1.4)
ERYTHROCYTE [DISTWIDTH] IN BLOOD BY AUTOMATED COUNT: 43.4 FL (ref 35.9–50)
GFR SERPLBLD CREATININE-BSD FMLA CKD-EPI: 71 ML/MIN/1.73 M 2
GLUCOSE SERPL-MCNC: 93 MG/DL (ref 65–99)
HCT VFR BLD AUTO: 36.8 % (ref 37–47)
HDLC SERPL-MCNC: 72 MG/DL
HGB BLD-MCNC: 12.5 G/DL (ref 12–16)
LDLC SERPL CALC-MCNC: 75 MG/DL
LV EJECT FRACT  99904: 64
LV EJECT FRACT MOD 2C 99903: 65.61
LV EJECT FRACT MOD 4C 99902: 59.49
LV EJECT FRACT MOD BP 99901: 63.57
MCH RBC QN AUTO: 31.6 PG (ref 27–33)
MCHC RBC AUTO-ENTMCNC: 34 G/DL (ref 32.2–35.5)
MCV RBC AUTO: 93.2 FL (ref 81.4–97.8)
PLATELET # BLD AUTO: 222 K/UL (ref 164–446)
PMV BLD AUTO: 9.5 FL (ref 9–12.9)
POTASSIUM SERPL-SCNC: 3.6 MMOL/L (ref 3.6–5.5)
RBC # BLD AUTO: 3.95 M/UL (ref 4.2–5.4)
SODIUM SERPL-SCNC: 137 MMOL/L (ref 135–145)
TRIGL SERPL-MCNC: 187 MG/DL (ref 0–149)
TROPONIN T SERPL-MCNC: <6 NG/L (ref 6–19)
WBC # BLD AUTO: 6.5 K/UL (ref 4.8–10.8)

## 2025-08-12 PROCEDURE — 84484 ASSAY OF TROPONIN QUANT: CPT

## 2025-08-12 PROCEDURE — 93306 TTE W/DOPPLER COMPLETE: CPT | Mod: 26 | Performed by: INTERNAL MEDICINE

## 2025-08-12 PROCEDURE — 99239 HOSP IP/OBS DSCHRG MGMT >30: CPT | Performed by: INTERNAL MEDICINE

## 2025-08-12 PROCEDURE — A9502 TC99M TETROFOSMIN: HCPCS

## 2025-08-12 PROCEDURE — 80048 BASIC METABOLIC PNL TOTAL CA: CPT

## 2025-08-12 PROCEDURE — G0378 HOSPITAL OBSERVATION PER HR: HCPCS

## 2025-08-12 PROCEDURE — 85027 COMPLETE CBC AUTOMATED: CPT

## 2025-08-12 PROCEDURE — A9270 NON-COVERED ITEM OR SERVICE: HCPCS

## 2025-08-12 PROCEDURE — 700111 HCHG RX REV CODE 636 W/ 250 OVERRIDE (IP)

## 2025-08-12 PROCEDURE — 700105 HCHG RX REV CODE 258: Performed by: INTERNAL MEDICINE

## 2025-08-12 PROCEDURE — 700102 HCHG RX REV CODE 250 W/ 637 OVERRIDE(OP): Performed by: INTERNAL MEDICINE

## 2025-08-12 PROCEDURE — 80061 LIPID PANEL: CPT

## 2025-08-12 PROCEDURE — 93306 TTE W/DOPPLER COMPLETE: CPT

## 2025-08-12 PROCEDURE — A9270 NON-COVERED ITEM OR SERVICE: HCPCS | Performed by: INTERNAL MEDICINE

## 2025-08-12 PROCEDURE — 700102 HCHG RX REV CODE 250 W/ 637 OVERRIDE(OP)

## 2025-08-12 RX ORDER — REGADENOSON 0.08 MG/ML
0.4 INJECTION, SOLUTION INTRAVENOUS ONCE
Status: COMPLETED | OUTPATIENT
Start: 2025-08-12 | End: 2025-08-12

## 2025-08-12 RX ORDER — REGADENOSON 0.08 MG/ML
INJECTION, SOLUTION INTRAVENOUS
Status: COMPLETED
Start: 2025-08-12 | End: 2025-08-12

## 2025-08-12 RX ORDER — HYDROXYZINE HYDROCHLORIDE 25 MG/1
25 TABLET, FILM COATED ORAL 3 TIMES DAILY PRN
Status: DISCONTINUED | OUTPATIENT
Start: 2025-08-12 | End: 2025-08-12 | Stop reason: HOSPADM

## 2025-08-12 RX ORDER — AMINOPHYLLINE 25 MG/ML
100 INJECTION, SOLUTION INTRAVENOUS
Status: DISCONTINUED | OUTPATIENT
Start: 2025-08-12 | End: 2025-08-12 | Stop reason: HOSPADM

## 2025-08-12 RX ORDER — MECLIZINE HYDROCHLORIDE 25 MG/1
25 TABLET ORAL 3 TIMES DAILY PRN
Status: DISCONTINUED | OUTPATIENT
Start: 2025-08-12 | End: 2025-08-12 | Stop reason: HOSPADM

## 2025-08-12 RX ORDER — SODIUM CHLORIDE 9 MG/ML
500 INJECTION, SOLUTION INTRAVENOUS ONCE
Status: COMPLETED | OUTPATIENT
Start: 2025-08-12 | End: 2025-08-12

## 2025-08-12 RX ADMIN — REGADENOSON 0.4 MG: 0.08 INJECTION, SOLUTION INTRAVENOUS at 08:19

## 2025-08-12 RX ADMIN — SODIUM CHLORIDE 500 ML: 9 INJECTION, SOLUTION INTRAVENOUS at 12:26

## 2025-08-12 RX ADMIN — ASPIRIN 81 MG: 81 TABLET, COATED ORAL at 06:40

## 2025-08-12 RX ADMIN — MECLIZINE HYDROCHLORIDE 25 MG: 25 TABLET ORAL at 12:25

## 2025-08-12 RX ADMIN — LEVOTHYROXINE SODIUM 100 MCG: 0.05 TABLET ORAL at 06:40

## 2025-08-12 RX ADMIN — OMEPRAZOLE 20 MG: 20 CAPSULE, DELAYED RELEASE ORAL at 06:40

## 2025-08-12 ASSESSMENT — PAIN DESCRIPTION - PAIN TYPE: TYPE: ACUTE PAIN

## 2025-08-14 ENCOUNTER — OFFICE VISIT (OUTPATIENT)
Dept: MEDICAL GROUP | Facility: PHYSICIAN GROUP | Age: 45
End: 2025-08-14
Payer: COMMERCIAL

## 2025-08-14 VITALS
WEIGHT: 158.6 LBS | BODY MASS INDEX: 28.1 KG/M2 | DIASTOLIC BLOOD PRESSURE: 64 MMHG | SYSTOLIC BLOOD PRESSURE: 110 MMHG | HEIGHT: 63 IN | RESPIRATION RATE: 14 BRPM | OXYGEN SATURATION: 100 % | HEART RATE: 90 BPM | TEMPERATURE: 98.6 F

## 2025-08-14 DIAGNOSIS — R71.8 ELEVATED RED BLOOD CELL COUNT: ICD-10-CM

## 2025-08-14 DIAGNOSIS — R11.0 NAUSEA: ICD-10-CM

## 2025-08-14 DIAGNOSIS — Z09 HOSPITAL DISCHARGE FOLLOW-UP: Primary | ICD-10-CM

## 2025-08-14 DIAGNOSIS — G43.909 MIGRAINE WITHOUT STATUS MIGRAINOSUS, NOT INTRACTABLE, UNSPECIFIED MIGRAINE TYPE: ICD-10-CM

## 2025-08-14 DIAGNOSIS — H53.8 BLURRY VISION, BILATERAL: ICD-10-CM

## 2025-08-14 ASSESSMENT — FIBROSIS 4 INDEX: FIB4 SCORE: 1.03

## 2025-08-14 ASSESSMENT — ENCOUNTER SYMPTOMS
HEADACHES: 1
DIZZINESS: 1
PALPITATIONS: 0
SHORTNESS OF BREATH: 0
COUGH: 0
VOMITING: 0
FEVER: 0
SORE THROAT: 0
CHILLS: 0
NAUSEA: 1
ABDOMINAL PAIN: 0

## 2025-08-19 DIAGNOSIS — E66.3 OVERWEIGHT: Primary | ICD-10-CM

## 2025-08-19 RX ORDER — PHENTERMINE HYDROCHLORIDE 37.5 MG/1
37.5 CAPSULE ORAL EVERY MORNING
Qty: 90 CAPSULE | Refills: 0 | Status: SHIPPED | OUTPATIENT
Start: 2025-08-19 | End: 2025-11-17

## 2025-08-24 RX ORDER — TRAZODONE HYDROCHLORIDE 100 MG/1
TABLET ORAL
Qty: 75 TABLET | Refills: 2 | Status: SHIPPED | OUTPATIENT
Start: 2025-08-24

## 2025-08-27 ENCOUNTER — HOSPITAL ENCOUNTER (OUTPATIENT)
Dept: RADIOLOGY | Facility: MEDICAL CENTER | Age: 45
End: 2025-08-27
Attending: FAMILY MEDICINE
Payer: COMMERCIAL

## 2025-08-27 DIAGNOSIS — G43.909 MIGRAINE WITHOUT STATUS MIGRAINOSUS, NOT INTRACTABLE, UNSPECIFIED MIGRAINE TYPE: ICD-10-CM

## 2025-08-27 DIAGNOSIS — R11.0 NAUSEA: ICD-10-CM

## 2025-08-27 PROCEDURE — 70450 CT HEAD/BRAIN W/O DYE: CPT

## (undated) DEVICE — CANISTER SUCTION RIGID RED 1500CC (40EA/CA)

## (undated) DEVICE — TUBE E-T HI-LO CUFF 7.0MM (10EA/PK)

## (undated) DEVICE — SYRINGE 30 ML LS (56/BX)

## (undated) DEVICE — SUTURE 3-0 MONOCRYL PLUS PS-1 - 27 INCH (36/BX)

## (undated) DEVICE — ELECTRODE DUAL RETURN W/ CORD - (50/PK)

## (undated) DEVICE — KIT ANESTHESIA W/CIRCUIT & 3/LT BAG W/FILTER (20EA/CA)

## (undated) DEVICE — DRAIN J-VAC 19FR. ROUND - (10/CS)

## (undated) DEVICE — SENSOR SPO2 NEO LNCS ADHESIVE (20/BX) SEE USER NOTES

## (undated) DEVICE — SUTURE 4-0 PROLENE PS-2 18 (36PK/BX)"

## (undated) DEVICE — GOWN WARMING STANDARD FLEX - (30/CA)

## (undated) DEVICE — SODIUM CHL IRRIGATION 0.9% 1000ML (12EA/CA)

## (undated) DEVICE — SUTURE 4-0 MONOCRYL PLUS PS-2 - 27 INCH (36/BX)

## (undated) DEVICE — KIT ROOM DECONTAMINATION

## (undated) DEVICE — PAD LAP STERILE 18 X 18 - (5/PK 40PK/CA)

## (undated) DEVICE — SUCTION INSTRUMENT YANKAUER BULBOUS TIP W/O VENT (50EA/CA)

## (undated) DEVICE — SLEEVE, VASO, THIGH, MED

## (undated) DEVICE — ELECTRODE 850 FOAM ADHESIVE - HYDROGEL RADIOTRNSPRNT (50/PK)

## (undated) DEVICE — GOWN SURGEONS X-LARGE - DISP. (30/CA)

## (undated) DEVICE — SUTURE 0 VICRYL PLUS UR-6 - 27 INCH (36/BX)

## (undated) DEVICE — TROCAR 5X100 NON BLADED Z-TH - READ KII (6/BX)

## (undated) DEVICE — FOLEY SLIPPERY 5CC 16 FR LF ALL SILICONE (12EA/CA)

## (undated) DEVICE — SYSTEM CLEARIFY VISUALIZATION (10EA/PK)

## (undated) DEVICE — CANISTER SUCTION 3000ML MECHANICAL FILTER AUTO SHUTOFF MEDI-VAC NONSTERILE LF DISP  (40EA/CA)

## (undated) DEVICE — RESERVOIR SUCTION 100 CC - SILICONE (20EA/CA)

## (undated) DEVICE — FORCEP BIPOLAR FENESTRATED - DA VINCI 10X'S REUSABLE

## (undated) DEVICE — SUTURE V-LOCK 90 ABSORBABLE 3.0 CL23 ON A P14 NEEDLE (12EA/CA)

## (undated) DEVICE — HUMID-VENT HEAT AND MOISTURE EXCHANGE- (50/BX)

## (undated) DEVICE — CHLORAPREP 26 ML APPLICATOR - ORANGE TINT(25/CA)

## (undated) DEVICE — SET SUCTION/IRRIGATION WITH DISPOSABLE TIP (6/CA )PART #0250-070-520 IS A SUB

## (undated) DEVICE — SUTURE 0 COATED VICRYL 6-18IN - (12PK/BX)

## (undated) DEVICE — PROTECTOR ULNA NERVE - (36PR/CA)

## (undated) DEVICE — PACK BREAST SM OR - (1EA/CA)

## (undated) DEVICE — DRESSING PETROLEUM GAUZE 5 X 9" (50EA/BX 4BX/CA)"

## (undated) DEVICE — MASK ANESTHESIA ADULT  - (100/CA)

## (undated) DEVICE — GLOVE SZ 7.5 BIOGEL PI MICRO - PF LF (50PR/BX)

## (undated) DEVICE — SUTURE2-0 20CM STRATAFIX SPIRAL PDS CT-1 (12EA/BX)

## (undated) DEVICE — LACTATED RINGERS INJ 1000 ML - (14EA/CA 60CA/PF)

## (undated) DEVICE — RELOAD STAPLER GREEN ENDOWRIST 45 (12EA/BX)

## (undated) DEVICE — SEAL CANNULA STAPLER 12 MM (10EA/BX)

## (undated) DEVICE — CANNULA SEAL 8.5-13MM (10/BX)

## (undated) DEVICE — HEAD HOLDER JUNIOR/ADULT

## (undated) DEVICE — ELECTRODE 5MM LHK LAPSCP STERILE DISP- MEGADYNE  (5/CA)

## (undated) DEVICE — LIGASURE TISSUE FUSION  - SINGLE USE (6/CA)

## (undated) DEVICE — STERI STRIP COMPOUND BENZOIN - TINCTURE 0.6ML WITH APPLICATOR (40EA/BX)

## (undated) DEVICE — TROCAR Z THREAD12MM OPTICAL - NON BLADED (6/BX)

## (undated) DEVICE — ROBOTIC SURGERY SERVICES

## (undated) DEVICE — GOWN SURGEONS LARGE - (32/CA)

## (undated) DEVICE — ANTI-FOG SOLUTION - 60BTL/CA

## (undated) DEVICE — BANDAGE ELASTIC DOUBLE 6X10 - (6EA/BX)"

## (undated) DEVICE — GAUZE FLUFF STERILE 2-PLY 36 X 36 (100EA/CA)

## (undated) DEVICE — NEEDLE DRIVER SUTURE CUT - DA VINCI 10X'S REUSABLE

## (undated) DEVICE — GLOVE SZ 7 BIOGEL PI MICRO - PF LF (50PR/BX 4BX/CA)

## (undated) DEVICE — TUBE NG SALEM SUMP 16FR (50EA/CA)

## (undated) DEVICE — Device

## (undated) DEVICE — GLOVE BIOGEL PI INDICATOR SZ 8.0 SURGICAL PF LF -(50/BX 4BX/CA)

## (undated) DEVICE — SUTURE GENERAL

## (undated) DEVICE — GLOVE BIOGEL PI INDICATOR SZ 7.5 SURGICAL PF LF -(50/BX 4BX/CA)

## (undated) DEVICE — SET LEADWIRE 5 LEAD BEDSIDE DISPOSABLE ECG (1SET OF 5/EA)

## (undated) DEVICE — BAG, SPONGE COUNT 50600

## (undated) DEVICE — TUBE CONNECTING SUCTION - CLEAR PLASTIC STERILE 72 IN (50EA/CA)

## (undated) DEVICE — SET TUBING PNEUMOCLEAR HIGH FLOW SMOKE EVACUATION (10EA/BX)

## (undated) DEVICE — WATER IRRIG. STER. 1500 ML - (9/CA)

## (undated) DEVICE — TUBE E-T HI-LO CUFF 6.5MM (10EA/BX)

## (undated) DEVICE — COVER TIP ENDOWRIST HOT SHEAR - (10EA/BX) DA VINCI

## (undated) DEVICE — DRAPE 3 ARM DA VANCI SI - (5EA/CA)

## (undated) DEVICE — SET EXTENSION WITH 2 PORTS (48EA/CA) ***PART #2C8610 IS A SUBSTITUTE*****

## (undated) DEVICE — WATER IRRIGATION STERILE 1000ML (12EA/CA)

## (undated) DEVICE — SUTURE 2-0 ETHIBOND GREEN CT-2 TAPER (36PK/BX)

## (undated) DEVICE — NEPTUNE 4 PORT MANIFOLD - (20/PK)

## (undated) DEVICE — SUTURE 3-0 PROLENE PS-1 (12PK/BX)

## (undated) DEVICE — DRAIN JACKSON PRATT 10FR - (10/CS)

## (undated) DEVICE — TUBING CLEARLINK DUO-VENT - C-FLO (48EA/CA)

## (undated) DEVICE — CLOSURE SKIN STRIP 1/2 X 4 IN - (STERI STRIP) (50/BX 4BX/CA)

## (undated) DEVICE — BAG DRAINAGE URINARY CLOSED 2000ML (20EA/CA)

## (undated) DEVICE — STAPLER 75MM LINEAR OPEN (3EA/BX)

## (undated) DEVICE — GLOVE BIOGEL SZ 7.5 SURGICAL PF LTX - (50PR/BX 4BX/CA)

## (undated) DEVICE — CLOSURE WOUND 1/4 X 4 (STERI - STRIP) (50/BX 4BX/CA)

## (undated) DEVICE — CANNULA W/SEAL 5X100 Z-THRE - ADED KII (12/BX)

## (undated) DEVICE — GLOVE, LITE (PAIR)

## (undated) DEVICE — BANDAID SHEER STRIP 3/4 IN (100EA/BX 12BX/CA)

## (undated) DEVICE — DRAPE LARGE 3 QUARTER - (20/CA)

## (undated) DEVICE — PAD OR TABLE DA VINCI 2IN X 20IN X 72IN - (12EA/CA)

## (undated) DEVICE — DRESSING ABDOMINAL PAD STERILE 8 X 10" (360EA/CA)"

## (undated) DEVICE — SUTURE 0 VICRYL PLUS CT-2 - 27 INCH (36/BX)

## (undated) DEVICE — BOVIE NEEDLE TIP INSULATD NON-SAFETY 2CM (50/PK)

## (undated) DEVICE — TROCAR 12 X 150 KII FIOS Z THREAD (6EA/BX)

## (undated) DEVICE — SCISSORS 5MM CVD (6EA/BX)

## (undated) DEVICE — GLOVE BIOGEL PI ORTHO SZ 8 PF LF (40PR/BX)

## (undated) DEVICE — PACK DAVINCI LOW ANTERIOR RESECTION (2EA/CA)

## (undated) DEVICE — OBTURATOR 8MM BLADELESS - (24EA/BX) DA VINCI

## (undated) DEVICE — SEAL 5MM-8MM UNIVERSAL  BOX OF 10

## (undated) DEVICE — DRAPE COLUMN  BOX OF 20

## (undated) DEVICE — PACK LAP CHOLE OR - (2EA/CA)

## (undated) DEVICE — DRESSING XEROFORM 1X8 - (50/BX 4BX/CA)

## (undated) DEVICE — SUTURE 1 VICRYL PLUS CTX - 8 X 18 INCH (12/BX)

## (undated) DEVICE — DRAPE MAYO STAND - (30/CA)

## (undated) DEVICE — SUTURE 4-0 VICRYL PLUSFS-1 - 27 INCH (36/BX)

## (undated) DEVICE — BLADE SURGICAL #10 - (50/BX)

## (undated) DEVICE — BANDAGE ELASTIC 6 IN X 5 YDS - LATEX FREE (10/BX)

## (undated) DEVICE — NEEDLE INSFL 120MM 14GA VRRS - (20/BX)

## (undated) DEVICE — DRAPE ARM  BOX OF 20

## (undated) DEVICE — SCISSOR MONOPLR CRV(HOT SHEAR - DA VINCI 10X'S REUSABLE

## (undated) DEVICE — SPONGE GAUZE STER 4X4 8-PL - (2/PK 50PK/BX 12BX/CS)

## (undated) DEVICE — SUTURE 3-0 VICRYL PLUS SH - 8X 18 INCH (12/BX)

## (undated) DEVICE — SUTURE 0 PDS CT-2 (36PK/BX)

## (undated) DEVICE — BLANKET WARMING LOWER BODY - (10/CA) INACTIVE USE #8585

## (undated) DEVICE — SEALER VESSEL DA VINCI XI (6EA/CA)

## (undated) DEVICE — SUTURE 4-0 MONOCRYL PLUSPC-3 - 18 INCH (12/BX)

## (undated) DEVICE — REDUCER XI STAPLER 12MM TO 8MM (6EA/BX)

## (undated) DEVICE — SYRINGE DISP. 12 CC LL - (100/BX)

## (undated) DEVICE — SHEATH STAPLER 45 DAVINCI (10EA/BX)

## (undated) DEVICE — GLOVE BIOGEL PI INDICATOR SZ 6.5 SURGICAL PF LF - (50/BX 4BX/CA)

## (undated) DEVICE — DRAIN J-VAC 7MM FLAT - (10EA/CA)

## (undated) DEVICE — TUBING LAPAROSCOPIC PLUME DEVICE (10EA/CA)

## (undated) DEVICE — OBTURATOR BLADELESS STANDARD 8MM (6EA/BX)

## (undated) DEVICE — TOWELS CLOTH SURGICAL - (4/PK 20PK/CA)

## (undated) DEVICE — SUTURE 2-0 PROLENE SH (36PK/BX)

## (undated) DEVICE — TUBE CONNECT SUCTION CLEAR 120 X 1/4" (50EA/CA)"